# Patient Record
Sex: MALE | Race: WHITE | Employment: OTHER | URBAN - METROPOLITAN AREA
[De-identification: names, ages, dates, MRNs, and addresses within clinical notes are randomized per-mention and may not be internally consistent; named-entity substitution may affect disease eponyms.]

---

## 2018-02-16 PROBLEM — E78.00 PURE HYPERCHOLESTEROLEMIA: Status: ACTIVE | Noted: 2018-02-16

## 2018-02-16 PROBLEM — I10 ESSENTIAL HYPERTENSION: Status: ACTIVE | Noted: 2018-02-16

## 2019-02-26 PROBLEM — Z87.19 HISTORY OF BARRETT'S ESOPHAGUS: Status: ACTIVE | Noted: 2019-02-26

## 2019-02-26 PROBLEM — Z86.010 PERSONAL HISTORY OF COLONIC POLYPS: Status: ACTIVE | Noted: 2019-02-26

## 2019-02-26 PROBLEM — Z98.890 OTHER SPECIFIED POSTPROCEDURAL STATES: Status: ACTIVE | Noted: 2019-02-26

## 2019-02-26 PROBLEM — K21.9 GERD (GASTROESOPHAGEAL REFLUX DISEASE): Status: ACTIVE | Noted: 2019-02-26

## 2019-06-13 ENCOUNTER — HOSPITAL ENCOUNTER (OUTPATIENT)
Dept: MRI IMAGING | Age: 69
Discharge: HOME | End: 2019-06-13
Attending: OTOLARYNGOLOGY
Payer: MEDICARE

## 2019-06-13 PROCEDURE — 74011250636 HC RX REV CODE- 250/636: Performed by: OTOLARYNGOLOGY

## 2019-06-13 PROCEDURE — 70553 MRI BRAIN STEM W/O & W/DYE: CPT

## 2019-06-13 PROCEDURE — A9575 INJ GADOTERATE MEGLUMI 0.1ML: HCPCS | Performed by: OTOLARYNGOLOGY

## 2019-06-13 RX ORDER — SODIUM CHLORIDE 0.9 % (FLUSH) 0.9 %
10 SYRINGE (ML) INJECTION
Status: COMPLETED | OUTPATIENT
Start: 2019-06-13 | End: 2019-06-13

## 2019-06-13 RX ORDER — GADOTERATE MEGLUMINE 376.9 MG/ML
18 INJECTION INTRAVENOUS
Status: COMPLETED | OUTPATIENT
Start: 2019-06-13 | End: 2019-06-13

## 2019-06-13 RX ADMIN — Medication 10 ML: at 09:10

## 2019-06-13 RX ADMIN — GADOTERATE MEGLUMINE 18 ML: 376.9 INJECTION INTRAVENOUS at 09:10

## 2020-05-13 LAB — SARS-COV-2, NAA: NOT DETECTED

## 2020-05-15 RX ORDER — GENTAMICIN SULFATE 40 MG/ML
160 INJECTION, SOLUTION INTRAMUSCULAR; INTRAVENOUS
Status: DISCONTINUED | OUTPATIENT
Start: 2020-05-15 | End: 2020-05-15 | Stop reason: SDUPTHER

## 2020-05-17 ENCOUNTER — ANESTHESIA EVENT (OUTPATIENT)
Dept: SURGERY | Age: 70
End: 2020-05-17
Payer: MEDICARE

## 2020-05-18 ENCOUNTER — HOSPITAL ENCOUNTER (OUTPATIENT)
Age: 70
Setting detail: OUTPATIENT SURGERY
Discharge: HOME | End: 2020-05-18
Attending: UROLOGY | Admitting: UROLOGY
Payer: MEDICARE

## 2020-05-18 ENCOUNTER — ANESTHESIA (OUTPATIENT)
Dept: SURGERY | Age: 70
End: 2020-05-18
Payer: MEDICARE

## 2020-05-18 VITALS
HEART RATE: 61 BPM | RESPIRATION RATE: 16 BRPM | BODY MASS INDEX: 27.85 KG/M2 | DIASTOLIC BLOOD PRESSURE: 75 MMHG | TEMPERATURE: 98 F | SYSTOLIC BLOOD PRESSURE: 107 MMHG | WEIGHT: 194.13 LBS | OXYGEN SATURATION: 93 %

## 2020-05-18 DIAGNOSIS — R97.20 ELEVATED PSA: Primary | ICD-10-CM

## 2020-05-18 PROCEDURE — 74011250636 HC RX REV CODE- 250/636: Performed by: NURSE ANESTHETIST, CERTIFIED REGISTERED

## 2020-05-18 PROCEDURE — 77030003481 HC NDL BIOP GUN BARD -B: Performed by: UROLOGY

## 2020-05-18 PROCEDURE — 76010000138 HC OR TIME 0.5 TO 1 HR: Performed by: UROLOGY

## 2020-05-18 PROCEDURE — 74011250636 HC RX REV CODE- 250/636: Performed by: UROLOGY

## 2020-05-18 PROCEDURE — 74011000250 HC RX REV CODE- 250: Performed by: UROLOGY

## 2020-05-18 PROCEDURE — 74011000258 HC RX REV CODE- 258: Performed by: UROLOGY

## 2020-05-18 PROCEDURE — 74011250636 HC RX REV CODE- 250/636: Performed by: ANESTHESIOLOGY

## 2020-05-18 PROCEDURE — 76210000020 HC REC RM PH II FIRST 0.5 HR: Performed by: UROLOGY

## 2020-05-18 PROCEDURE — 88305 TISSUE EXAM BY PATHOLOGIST: CPT

## 2020-05-18 PROCEDURE — 76060000032 HC ANESTHESIA 0.5 TO 1 HR: Performed by: UROLOGY

## 2020-05-18 PROCEDURE — 88344 IMHCHEM/IMCYTCHM EA MLT ANTB: CPT

## 2020-05-18 PROCEDURE — 74011000250 HC RX REV CODE- 250: Performed by: NURSE ANESTHETIST, CERTIFIED REGISTERED

## 2020-05-18 PROCEDURE — 76210000063 HC OR PH I REC FIRST 0.5 HR: Performed by: UROLOGY

## 2020-05-18 RX ORDER — GLYCOPYRROLATE 0.2 MG/ML
INJECTION INTRAMUSCULAR; INTRAVENOUS AS NEEDED
Status: DISCONTINUED | OUTPATIENT
Start: 2020-05-18 | End: 2020-05-18 | Stop reason: HOSPADM

## 2020-05-18 RX ORDER — NALOXONE HYDROCHLORIDE 0.4 MG/ML
0.2 INJECTION, SOLUTION INTRAMUSCULAR; INTRAVENOUS; SUBCUTANEOUS AS NEEDED
Status: DISCONTINUED | OUTPATIENT
Start: 2020-05-18 | End: 2020-05-18 | Stop reason: HOSPADM

## 2020-05-18 RX ORDER — PROPOFOL 10 MG/ML
INJECTION, EMULSION INTRAVENOUS AS NEEDED
Status: DISCONTINUED | OUTPATIENT
Start: 2020-05-18 | End: 2020-05-18 | Stop reason: HOSPADM

## 2020-05-18 RX ORDER — FENTANYL CITRATE 50 UG/ML
INJECTION, SOLUTION INTRAMUSCULAR; INTRAVENOUS AS NEEDED
Status: DISCONTINUED | OUTPATIENT
Start: 2020-05-18 | End: 2020-05-18 | Stop reason: HOSPADM

## 2020-05-18 RX ORDER — LIDOCAINE HYDROCHLORIDE 20 MG/ML
INJECTION, SOLUTION EPIDURAL; INFILTRATION; INTRACAUDAL; PERINEURAL AS NEEDED
Status: DISCONTINUED | OUTPATIENT
Start: 2020-05-18 | End: 2020-05-18 | Stop reason: HOSPADM

## 2020-05-18 RX ORDER — DIPHENHYDRAMINE HCL 25 MG
25 CAPSULE ORAL
COMMUNITY

## 2020-05-18 RX ORDER — SODIUM CHLORIDE, SODIUM LACTATE, POTASSIUM CHLORIDE, CALCIUM CHLORIDE 600; 310; 30; 20 MG/100ML; MG/100ML; MG/100ML; MG/100ML
75 INJECTION, SOLUTION INTRAVENOUS CONTINUOUS
Status: DISCONTINUED | OUTPATIENT
Start: 2020-05-18 | End: 2020-05-18 | Stop reason: HOSPADM

## 2020-05-18 RX ORDER — CEFAZOLIN SODIUM/WATER 2 G/20 ML
2 SYRINGE (ML) INTRAVENOUS
Status: COMPLETED | OUTPATIENT
Start: 2020-05-18 | End: 2020-05-18

## 2020-05-18 RX ORDER — MIDAZOLAM HYDROCHLORIDE 1 MG/ML
INJECTION, SOLUTION INTRAMUSCULAR; INTRAVENOUS AS NEEDED
Status: DISCONTINUED | OUTPATIENT
Start: 2020-05-18 | End: 2020-05-18 | Stop reason: HOSPADM

## 2020-05-18 RX ORDER — SODIUM CHLORIDE 0.9 % (FLUSH) 0.9 %
5-40 SYRINGE (ML) INJECTION AS NEEDED
Status: DISCONTINUED | OUTPATIENT
Start: 2020-05-18 | End: 2020-05-18 | Stop reason: HOSPADM

## 2020-05-18 RX ORDER — SODIUM CHLORIDE 0.9 % (FLUSH) 0.9 %
5-40 SYRINGE (ML) INJECTION EVERY 8 HOURS
Status: DISCONTINUED | OUTPATIENT
Start: 2020-05-18 | End: 2020-05-18 | Stop reason: HOSPADM

## 2020-05-18 RX ORDER — HYDROCODONE BITARTRATE AND ACETAMINOPHEN 5; 325 MG/1; MG/1
1 TABLET ORAL
Qty: 10 TAB | Refills: 0 | Status: SHIPPED | OUTPATIENT
Start: 2020-05-18 | End: 2020-08-19 | Stop reason: CLARIF

## 2020-05-18 RX ORDER — LIDOCAINE HYDROCHLORIDE 10 MG/ML
0.1 INJECTION INFILTRATION; PERINEURAL AS NEEDED
Status: DISCONTINUED | OUTPATIENT
Start: 2020-05-18 | End: 2020-05-18 | Stop reason: HOSPADM

## 2020-05-18 RX ORDER — HYDROMORPHONE HYDROCHLORIDE 2 MG/ML
0.5 INJECTION, SOLUTION INTRAMUSCULAR; INTRAVENOUS; SUBCUTANEOUS
Status: DISCONTINUED | OUTPATIENT
Start: 2020-05-18 | End: 2020-05-18 | Stop reason: HOSPADM

## 2020-05-18 RX ORDER — LIDOCAINE HYDROCHLORIDE 20 MG/ML
JELLY TOPICAL AS NEEDED
Status: DISCONTINUED | OUTPATIENT
Start: 2020-05-18 | End: 2020-05-18 | Stop reason: HOSPADM

## 2020-05-18 RX ORDER — OXYCODONE AND ACETAMINOPHEN 5; 325 MG/1; MG/1
1 TABLET ORAL AS NEEDED
Status: DISCONTINUED | OUTPATIENT
Start: 2020-05-18 | End: 2020-05-18 | Stop reason: HOSPADM

## 2020-05-18 RX ORDER — MIDAZOLAM HYDROCHLORIDE 1 MG/ML
2 INJECTION, SOLUTION INTRAMUSCULAR; INTRAVENOUS
Status: DISCONTINUED | OUTPATIENT
Start: 2020-05-18 | End: 2020-05-18 | Stop reason: HOSPADM

## 2020-05-18 RX ORDER — TADALAFIL 5 MG/1
5 TABLET ORAL DAILY
Qty: 30 TAB | Refills: 12 | Status: SHIPPED | OUTPATIENT
Start: 2020-05-18 | End: 2020-08-19 | Stop reason: CLARIF

## 2020-05-18 RX ORDER — ONDANSETRON 2 MG/ML
INJECTION INTRAMUSCULAR; INTRAVENOUS AS NEEDED
Status: DISCONTINUED | OUTPATIENT
Start: 2020-05-18 | End: 2020-05-18 | Stop reason: HOSPADM

## 2020-05-18 RX ADMIN — PROPOFOL 30 MG: 10 INJECTION, EMULSION INTRAVENOUS at 08:15

## 2020-05-18 RX ADMIN — LIDOCAINE HYDROCHLORIDE 80 MG: 20 INJECTION, SOLUTION EPIDURAL; INFILTRATION; INTRACAUDAL; PERINEURAL at 08:14

## 2020-05-18 RX ADMIN — FENTANYL CITRATE 50 MCG: 50 INJECTION INTRAMUSCULAR; INTRAVENOUS at 08:13

## 2020-05-18 RX ADMIN — FENTANYL CITRATE 50 MCG: 50 INJECTION INTRAMUSCULAR; INTRAVENOUS at 08:15

## 2020-05-18 RX ADMIN — PROPOFOL 20 MG: 10 INJECTION, EMULSION INTRAVENOUS at 08:28

## 2020-05-18 RX ADMIN — GENTAMICIN SULFATE 160 MG: 40 INJECTION, SOLUTION INTRAMUSCULAR; INTRAVENOUS at 07:02

## 2020-05-18 RX ADMIN — PROPOFOL 20 MG: 10 INJECTION, EMULSION INTRAVENOUS at 08:21

## 2020-05-18 RX ADMIN — Medication 2 G: at 08:15

## 2020-05-18 RX ADMIN — PROPOFOL 20 MG: 10 INJECTION, EMULSION INTRAVENOUS at 08:20

## 2020-05-18 RX ADMIN — SODIUM CHLORIDE, SODIUM LACTATE, POTASSIUM CHLORIDE, AND CALCIUM CHLORIDE 75 ML/HR: 600; 310; 30; 20 INJECTION, SOLUTION INTRAVENOUS at 07:02

## 2020-05-18 RX ADMIN — ONDANSETRON 4 MG: 2 INJECTION INTRAMUSCULAR; INTRAVENOUS at 08:26

## 2020-05-18 RX ADMIN — PROPOFOL 20 MG: 10 INJECTION, EMULSION INTRAVENOUS at 08:25

## 2020-05-18 RX ADMIN — MIDAZOLAM HYDROCHLORIDE 2 MG: 2 INJECTION, SOLUTION INTRAMUSCULAR; INTRAVENOUS at 08:07

## 2020-05-18 RX ADMIN — GLYCOPYRROLATE 0.15 MG: 0.2 INJECTION, SOLUTION INTRAMUSCULAR; INTRAVENOUS at 08:07

## 2020-05-18 NOTE — ANESTHESIA POSTPROCEDURE EVALUATION
Procedure(s):  TRANSRECTAL ULTRASOUND GUIDED PROSTATE BIOPSY.     total IV anesthesia    Anesthesia Post Evaluation      Multimodal analgesia: multimodal analgesia used between 6 hours prior to anesthesia start to PACU discharge  Patient location during evaluation: PACU  Patient participation: complete - patient participated  Level of consciousness: awake and alert  Pain management: adequate  Airway patency: patent  Anesthetic complications: no  Cardiovascular status: acceptable  Respiratory status: acceptable  Hydration status: acceptable  Post anesthesia nausea and vomiting:  none      Vitals Value Taken Time   /75 5/18/2020  9:06 AM   Temp 36.1 °C (97 °F) 5/18/2020  8:42 AM   Pulse 60 5/18/2020  9:06 AM   Resp 16 5/18/2020  9:06 AM   SpO2 96 % 5/18/2020  9:06 AM

## 2020-05-18 NOTE — OP NOTES
[facility]  OPERATIVE REPORT    Name:  Jaskaran Jordan  MR#:  [id number]  :  1950  ACCOUNT #:  [de-identified]  DATE OF SERVICE:  2020    PREOPERATIVE DIAGNOSIS:  Elevated prostate-specific antigen. POSTOPERATIVE DIAGNOSIS:  Elevated prostate-specific antigen. PROCEDURE PERFORMED:  Transurethral ultrasound and biopsy of prostate under MAC anesthesia. SURGEON:  Edy Hooker MD    ASSISTANT:      ANESTHESIA:  MAC with topical Xylocaine. COMPLICATIONS:      SPECIMENS REMOVED:      IMPLANTS:      ESTIMATED BLOOD LOSS:      PROCEDURE INDICATION:  A 79-year-old male with elevating PSA. The patient has had some mild symptoms of BPH. His PSA had gone from upper limits of normal for his age which is in the 4.1-4.6 range and then up to 5.5 and then 6.5. It was elected to do a prostate ultrasound and biopsy. However, he could not tolerate it in the office due to a small degree of anal stenosis. It was elected to do his procedure in the hospital under [place] [address] anesthesia. PROCEDURE:  The patient was taken to the operating room suite, underwent general anesthesia, placed in the dorsal lithotomy position, prepped with a Betadine enema and Xylocaine jelly. The patient had the transrectal probe passed per rectum. Prostate images seemed normal.  It was symmetrical, about 55 mL volume. Seminal vesicles appeared normal.  No obvious hypoechoic areas. Biopsies were taken in a systematic randomized sample from the 12 quadrants of the prostate. These were sent to the lab for pathologic analysis. The patient had minimal bleeding. The probe was removed at the end of the case. The patient was sent to recovery in stable condition. He would finish out his Cipro and was sent home on some Norco for pain and was given a prescription for Cialis 5 mg take 1 a day for BPH and erectile dysfunction and continue on his tamsulosin. We will follow up to discuss results.   The patient should limit his activity today.       Florentin Soares MD [id number]  D:  05/18/2020 8:54  T:  05/18/2020 10:35  JOB #:  [id number]

## 2020-05-18 NOTE — DISCHARGE INSTRUCTIONS
Patient Education        Prostate Biopsy: About This Test  What is it? A prostate biopsy is a type of test. Your doctor takes about 10 to 12 small tissue samples from your prostate. Then another doctor looks at the tissue under a microscope to see if there are cancer cells. Why is this test done? You may need a prostate biopsy if your doctor found something of concern in your lab work or during your exam. A biopsy can help find out if you have prostate cancer. It may also be done for other reasons, such as monitoring the growth of prostate cancer for men on active surveillance. How do you prepare for the test?  Before you have a prostate biopsy, tell your doctor if you are taking any medicines or using any herbal supplements, or if you are allergic to any medicines. And tell your doctor if you have had bleeding problems, or you take aspirin or some other blood thinner. How is the test done? Some men have a prostate imaging test, such as an MRI or a CT scan, before their biopsy. The test results are used during the biopsy to select the areas of the prostate to sample. Before your biopsy, you may be given antibiotics to prevent infection. You may be asked to take off all of your clothes and put on a hospital gown. You may be given a sedative through a vein (IV) in your arm. The sedative will help you relax and stay still. If you have a general anesthetic, you will be in a recovery room for a few hours after the biopsy. Through the rectum  · You may be asked to kneel, lie on your side, or lie on your back. · Your doctor may inject an anesthetic around and into the prostate to numb the area before samples are taken. · An ultrasound probe will be gently inserted into your rectum. · A thin tool with a spring-loaded needle will be inserted next to the ultrasound probe. The ultrasound helps to locate the areas on the prostate where the samples will be taken.  If you had an MRI or CT scan, the test results will also be used to guide the sampling. · The needle enters the prostate and removes a sample. About 10 to 12 samples are usually taken. Through the perineum  · You will lie on an exam table either on your side or on your back with your knees bent. You will get anesthesia. The anesthesia may make you sleep. Or it may just numb the area being worked on.  · Your doctor will make a small cut in your perineum. Then he or she will collect samples from the prostate through the cut with a special tool. · An ultrasound probe inserted into the rectum may be used to help find the locations in the prostate where samples need to be taken. Sometimes other imaging, such as MRI, is used instead of or along with ultrasound. Or the test results from other imaging, such as an MRI or a CT scan, may be used to guide the sampling. How long will the test take? This test will take from 15 to 45 minutes, depending on how it's done. How is the test done? You will probably be able to go home right away, and you may feel tired the rest of the day. Your muscles may ache. Don't do heavy work or exercise for 4 hours after the test.  You may have mild pain in your pelvic area and blood in your urine for up to 5 days. You may have some blood in your semen for a week or longer. You may also have a change in color of your semen for up to 1 month after the biopsy. If the prostate samples are taken through the rectal wall (transrectal biopsy), you may have a small amount of bleeding from your rectum for 2 to 3 days after the biopsy. Follow-up care is a key part of your treatment and safety. Be sure to make and go to all appointments, and call your doctor if you are having problems. It's also a good idea to keep a list of the medicines you take. Ask your doctor when you can expect to have your test results. Where can you learn more?   Go to http://genie-ashlyn.info/  Enter Y504 in the search box to learn more about \"Prostate Biopsy: About This Test.\"  Current as of: August 21, 2019Content Version: 12.4  © 1922-3184 [employer]. Care instructions adapted under license by [employer] (which disclaims liability or warranty for this information). If you have questions about a medical condition or this instruction, always ask your healthcare professional. [address] any warranty or liability for your use of this information. ACTIVITY  · As tolerated and as directed by your doctor. · Bathe or shower as directed by your doctor. DIET  · Clear liquids until no nausea or vomiting; then light diet for the first day. · Advance to regular diet on second day, unless your doctor orders otherwise. · If nausea and vomiting continues, call your doctor. PAIN  · Take pain medication as directed by your doctor. · Call your doctor if pain is NOT relieved by medication. · DO NOT take aspirin of blood thinners unless directed by your doctor. CALL YOUR DOCTOR IF   · Excessive bleeding that does not stop after holding pressure over the area  · Temperature of 101 degrees F or above  · Excessive redness, swelling or bruising, and/ or green or yellow, smelly discharge from incision    AFTER ANESTHESIA   · For the first 24 hours: DO NOT Drive, Drink alcoholic beverages, or Make important decisions. · Be aware of dizziness following anesthesia and while taking pain medication. After general anesthesia or intravenous sedation, for 24 hours or while taking prescription Narcotics:  · Limit your activities  · A responsible adult needs to be with you for the next 24 hours  · Do not drive and operate hazardous machinery  · Do not make important personal or business decisions  · Do not drink alcoholic beverages  · If you have not urinated within 8 hours after discharge, please contact your surgeon on call.   · If you have sleep apnea and have a CPAP machine, please use it for all naps and sleeping. · Please use caution when taking narcotics and any of your home medications that may cause drowsiness. *  Please give a list of your current medications to your Primary Care Provider. *  Please update this list whenever your medications are discontinued, doses are      changed, or new medications (including over-the-counter products) are added. *  Please carry medication information at all times in case of emergency situations. These are general instructions for a healthy lifestyle:  No smoking/ No tobacco products/ Avoid exposure to second hand smoke  Surgeon General's Warning:  Quitting smoking now greatly reduces serious risk to your health. Obesity, smoking, and sedentary lifestyle greatly increases your risk for illness  A healthy diet, regular physical exercise & weight monitoring are important for maintaining a healthy lifestyle    You may be retaining fluid if you have a history of heart failure or if you experience any of the following symptoms:  Weight gain of 3 pounds or more overnight or 5 pounds in a week, increased swelling in our hands or feet or shortness of breath while lying flat in bed. Please call your doctor as soon as you notice any of these symptoms; do not wait until your next office visit.

## 2020-05-18 NOTE — PERIOP NOTES
Wife Deirdre Ho contacted, discussed discharge instructions, no further questions or concerns at this time. Security code obtained to give information.

## 2020-05-18 NOTE — H&P
[facility]  [address]  [facility] [address] [facility] Maria Fernanda. : 1950        PSA 11/23/15= 4.6  PSA 16 = 4.1, Free 0.80, 19.5%  PSA 17 = 4.6, Free 0.88, %19.1  PSA 18 = 4.1, Free 0.79, %19.3  PSA 19 = 5.5, Free 0.93, % 16.9  PSA 2020 = 6.48     Patient comes in today for prostate ultrasound and biopsy. HPI   81 y.o., male presents for prostate biopsy.             Past Medical History:   Diagnosis Date    Allergic rhinitis      Bilateral impacted cerumen      BMI 30.0-30.9,adult       BMI 30.3    Claustrophobia       mild    Essential hypertension 2018    Family history of colon cancer       mother and sister    GERD (gastroesophageal reflux disease)       hx of nissen fundoplication; managed with Prilosec    History of Jimenes's esophagus      History of stomach ulcers      Hx of colonic polyps 2016     adenomas    Hypertension       controlled with medication    IBS (irritable bowel syndrome)       no current medications    Laryngopharyngeal reflux      Neck mass      Post-nasal drainage      Pure hypercholesterolemia 2018            Past Surgical History:   Procedure Laterality Date    HX COLONOSCOPY   last 16     Liv--asc TA, sigmoid TA, random bx negative--5 year recall    HX GI        nissen fundoplication surgery due to reflux    HX HERNIA REPAIR Right       right inguinal    HX MOHS PROCEDURES Right 2011     X2    HX OPEN CHOLECYSTECTOMY       HX OTHER SURGICAL   2013     S/P Excision of right submandibular gland with FS    HX TONSILLECTOMY                 Current Outpatient Medications   Medication Sig Dispense Refill    ciprofloxacin HCl (Cipro) 500 mg tablet Take the first pill 1 hour prior to procedure then BID until completed 14 Tab 0    diazePAM (Valium) 10 mg tablet Take one hour prior to procedure.  Must have  1 Tab 0    tamsulosin (FLOMAX) 0.4 mg capsule TAKE 1 CAPSULE AT BEDTIME 90 Cap 3    omeprazole (PRILOSEC) 40 mg capsule Take 1 Cap by mouth daily. 90 Cap 3    olmesartan (BENICAR) 20 mg tablet Take 1 Tab by mouth daily. 90 Tab 4      No Known Allergies  Social History            Socioeconomic History    Marital status:        Spouse name: Not on file    Number of children: Not on file    Years of education: Not on file    Highest education level: Not on file   Occupational History    Not on file   Social Needs    Financial resource strain: Not on file    Food insecurity       Worry: Not on file       Inability: Not on file    Transportation needs       Medical: Not on file       Non-medical: Not on file   Tobacco Use    Smoking status: Former Smoker       Packs/day: 0.25       Years: 1.00       Pack years: 0.25       Last attempt to quit: 1978       Years since quittin.0    Smokeless tobacco: Former User   Substance and Sexual Activity    Alcohol use:  Yes       Comment: on occasion    Drug use: No    Sexual activity: Not on file   Lifestyle    Physical activity       Days per week: Not on file       Minutes per session: Not on file    Stress: Not on file   Relationships    Social connections       Talks on phone: Not on file       Gets together: Not on file       Attends Jainism service: Not on file       Active member of club or organization: Not on file       Attends meetings of clubs or organizations: Not on file       Relationship status: Not on file    Intimate partner violence       Fear of current or ex partner: Not on file       Emotionally abused: Not on file       Physically abused: Not on file       Forced sexual activity: Not on file   Other Topics Concern    Not on file   Social History Narrative    Not on file            Family History   Problem Relation Age of Onset    Arthritis-osteo Mother      Heart Disease Mother      Cancer Mother           colon    No Known Problems Father           did not know father   Nichole Rui Sister           colon    Cancer Sister           ovarian CA, melonoma    Malignant Hyperthermia Neg Hx      Pseudocholinesterase Deficiency Neg Hx      Delayed Awakening Neg Hx      Post-op Nausea/Vomiting Neg Hx      Emergence Delirium Neg Hx      Post-op Cognitive Dysfunction Neg Hx      Other Neg Hx           Patient Vitals for the past 12 hrs:   Temp Pulse Resp BP SpO2   05/18/20 0652 97.8 °F (36.6 °C) 68 18 146/80 100 %     Physical Exam  Constitutional:       Appearance: Normal appearance. HENT:      Head: Normocephalic. Cardiovascular:      Rate and Rhythm: Normal rate and regular rhythm. Pulmonary:      Effort: Pulmonary effort is normal. No respiratory distress. Breath sounds: Normal breath sounds. Abdominal:      Palpations: There is no mass. Hernia: No hernia is present. Genitourinary:     Penis: Normal.    Skin:     General: Skin is warm and dry. Neurological:      General: No focal deficit present. Mental Status: He is alert. Psychiatric:         Mood and Affect: Mood normal.         Behavior: Behavior normal.               TRANSRECTAL ULTRASOUND GUIDED BIOPSY OF THE PROSTATE  Even using topical Xylocaine I could not get the probe in the rear end. Due to excessive pain. Assessment and Plan      ICD-10-CM ICD-9-CM     1.  Elevated PSA R97.20 790.93 AMB POC US, TRANSRECTAL         gentamicin (GARAMYCIN) injection 80 mg         CANCELED: NM BIOPSY OF PROSTATE,NEEDLE/PUNCH         CANCELED: [facility] SURGICAL PATHOLOGY         CANCELED: SURGICAL TRAYS         CANCELED: [facility] SURGICAL PATHOLOGY      Patient Active Problem List   Diagnosis Code    Erectile dysfunction due to arterial insufficiency N52.01    Elevated PSA R97.20    Laryngopharyngeal reflux K21.9    Neck mass R22.1    Essential hypertension I10    Pure hypercholesterolemia E78.00    BMI 29.0-29.9,adult Z68.29    GERD (gastroesophageal reflux disease) K21.9    Other specified postprocedural states Z98.890    History of Jimenes's esophagus Z87.19    Personal history of colonic polyps Z86.010          Patient could not tolerate the office prostate ultrasound/biopsy so we will get this set up as an outpatient

## 2020-05-18 NOTE — ANESTHESIA PREPROCEDURE EVALUATION
Relevant Problems   No relevant active problems   Anesthetic History   No history of anesthetic complications            Review of Systems / Medical History  Patient summary reviewed and pertinent labs reviewed    Pulmonary  Within defined limits                 Neuro/Psych   Within defined limits           Cardiovascular    Hypertension: well controlled              Exercise tolerance: >4 METS     GI/Hepatic/Renal     GERD: poorly controlled           Endo/Other  Within defined limits           Other Findings              Physical Exam    Airway  Mallampati: II  TM Distance: 4 - 6 cm  Neck ROM: normal range of motion   Mouth opening: Normal     Cardiovascular    Rhythm: regular  Rate: normal         Dental  No notable dental hx       Pulmonary  Breath sounds clear to auscultation               Abdominal  GI exam deferred       Other Findings            Anesthetic Plan    ASA: 2  Anesthesia type: total IV anesthesia          Induction: Intravenous  Anesthetic plan and risks discussed with: Patient                Anesthetic History   No history of anesthetic complications            Review of Systems / Medical History  Patient summary reviewed and pertinent labs reviewed    Pulmonary  Within defined limits              Comments: Allergic rhinitis   Neuro/Psych   Within defined limits           Cardiovascular    Hypertension: well controlled              Exercise tolerance: >4 METS     GI/Hepatic/Renal     GERD: well controlled          Comments: IBS    H/O Jimenes's esophagus Endo/Other             Other Findings            Physical Exam    Airway  Mallampati: II  TM Distance: 4 - 6 cm  Neck ROM: normal range of motion   Mouth opening: Normal     Cardiovascular    Rhythm: regular           Dental  No notable dental hx       Pulmonary  Breath sounds clear to auscultation               Abdominal  GI exam deferred       Other Findings            Anesthetic Plan    ASA: 2  Anesthesia type: total IV anesthesia          Induction: Intravenous  Anesthetic plan and risks discussed with: Patient

## 2020-05-18 NOTE — BRIEF OP NOTE
Brief Postoperative Note    Patient: Sydnee Hobson. YOB: 1950  MRN: [id number]    Date of Procedure: 5/18/2020     Pre-Op Diagnosis: Elevated PSA [R97.20]    Post-Op Diagnosis: Same as preoperative diagnosis.       Procedure(s):  TRANSRECTAL ULTRASOUND GUIDED PROSTATE BIOPSY    Surgeon(s):  Halie Asencio MD    Surgical Assistant: None    Anesthesia: MAC     Estimated Blood Loss (mL): Minimal    Complications: None    Specimens:   ID Type Source Tests Collected by Time Destination   1 : RB Preservative Prostate  Halie Asencio MD 5/18/2020 6374 Pathology   2 : RLB Preservative Edwige Asencio MD 5/18/2020 2214 Pathology   3 : RLNATHANIEL Preservative Prostate  Halie Asencio MD 5/18/2020 6376 Pathology   4 : RM Preservative Prostate  Halie Asencio MD 5/18/2020 8627 Pathology   5 : RA Preservative Edwige Asencio MD 5/18/2020 5013 Pathology   6 : RLA Preservative Edwige Asencio MD 5/18/2020 0398 Pathology   7 : LB Preservative Edwige Asencio MD 5/18/2020 0825 Pathology   8 : LLB Preservative Edwige Asencio MD 5/18/2020 0825 Pathology   9 : LLM Preservative Edwige Asencio MD 5/18/2020 5820 Pathology   10 : LM Preservative Edwige Asencio MD 5/18/2020 0827 Pathology   11 : LLA Preservative Edwige Asencio MD 5/18/2020 4801 Pathology   12 : LA Preservative Edwige Asencio MD 5/18/2020 0830 Pathology        Implants: * No implants in log *    Drains: * No LDAs found *    Findings:     Electronically Signed by Morteza Parker MD on 5/18/2020 at 8:37 AM

## 2020-06-12 ENCOUNTER — HOSPITAL ENCOUNTER (OUTPATIENT)
Dept: NUCLEAR MEDICINE | Age: 70
Discharge: HOME | End: 2020-06-12
Attending: UROLOGY
Payer: MEDICARE

## 2020-06-12 DIAGNOSIS — C61 PROSTATE CANCER (HCC): ICD-10-CM

## 2020-06-12 PROCEDURE — 78306 BONE IMAGING WHOLE BODY: CPT

## 2020-06-18 ENCOUNTER — HOSPITAL ENCOUNTER (OUTPATIENT)
Dept: MRI IMAGING | Age: 70
Discharge: HOME | End: 2020-06-18
Attending: UROLOGY
Payer: MEDICARE

## 2020-06-18 DIAGNOSIS — C61 PROSTATE CANCER (HCC): ICD-10-CM

## 2020-06-18 PROCEDURE — 72197 MRI PELVIS W/O & W/DYE: CPT

## 2020-06-18 PROCEDURE — A9575 INJ GADOTERATE MEGLUMI 0.1ML: HCPCS | Performed by: UROLOGY

## 2020-06-18 PROCEDURE — 74011250636 HC RX REV CODE- 250/636: Performed by: UROLOGY

## 2020-06-18 RX ORDER — SODIUM CHLORIDE 0.9 % (FLUSH) 0.9 %
10 SYRINGE (ML) INJECTION
Status: COMPLETED | OUTPATIENT
Start: 2020-06-18 | End: 2020-06-18

## 2020-06-18 RX ORDER — GADOTERATE MEGLUMINE 376.9 MG/ML
18 INJECTION INTRAVENOUS
Status: COMPLETED | OUTPATIENT
Start: 2020-06-18 | End: 2020-06-18

## 2020-06-18 RX ADMIN — Medication 10 ML: at 08:37

## 2020-06-18 RX ADMIN — GADOTERATE MEGLUMINE 18 ML: 376.9 INJECTION INTRAVENOUS at 08:37

## 2020-08-10 ENCOUNTER — HOSPITAL ENCOUNTER (OUTPATIENT)
Dept: PHYSICAL THERAPY | Age: 70
Discharge: HOME | End: 2020-08-10
Attending: UROLOGY
Payer: MEDICARE

## 2020-08-10 DIAGNOSIS — C61 PROSTATE CANCER (HCC): ICD-10-CM

## 2020-08-10 PROCEDURE — 97110 THERAPEUTIC EXERCISES: CPT

## 2020-08-10 PROCEDURE — 97162 PT EVAL MOD COMPLEX 30 MIN: CPT

## 2020-08-10 NOTE — THERAPY EVALUATION
Laura Rowland. : 1950  [place]  Secondary: [address] at [address] Assessment 8/10/2020    ICD-10: Treatment Diagnosis: R27.8 Lack of coordination (muscle incoordination),   Precautions/Allergies:   Patient has no known allergies. TREATMENT PLAN:  Effective Dates: 8/10/2020 TO 2020 (90 days). Frequency/Duration: Follow up 3 weeks post-op, then resume PT 1 x weekly. Duration to be determined post-operatively. MEDICAL/REFERRING DIAGNOSIS:  Prostate cancer ([facility] [address] Ranjit Nelson   DATE OF ONSET:   REFERRING PHYSICIAN: Lake Brenner DO MD Orders: evaluate and treat  Return MD Appointment: Pre-operative 20     INITIAL ASSESSMENT:  Mr. Birdie Ritchie presents with decreased coordination, strength and endurance of pelvic floor muscle pre operatively. Today he was educated on bladder health, kegel exercises, pelvic floor anatomy and role of musculature and precautions with catheter post operatively. He required verbal and visual cuing for proper activation and isolation of pelvic floor muscles. He was able to contract muscle for 5 seconds with breath holding and accessory muscle use of gluteals (minimal). He was given kegel exercises to do at home prior to surgery and once catheter is removed. He was educated on pain after surgery and precautions with kegel exercises. He was able to demonstrate improved coordination by the end of the session today. He will continue to benefit from skilled physical therapy to address above mentioned deficits and restore normal PFM function post operatively to minimize urinary incontinence     PROBLEM LIST (Impacting functional limitations):  1. Decreased Strength  2. Decreased Knowledge of Precautions  3. Decreased Emmons with Home Exercise Program  4.  Decreased strength of pelvic floor which limits bladder control INTERVENTIONS PLANNED:  1. Neuromuscular re-education  2. Biofeedback as needed  3. HEP  4. Bladder retraining  5. Bladder education  6. Therapeutic Activites  7. Therapeutic Exercise/Strengthening     GOALS: (Goals have been discussed and agreed upon with patient.)  Short-Term Functional Goals: Time Frame: 4 weeks  1. Pt will demonstrate I with basic PFM HEP to improve awareness, coordination, and timing of PFM. 2. Pt will increase water intake by 16 oz and decrease irritant consumption by 8 oz to improve bladder health and decrease UI. Discharge Goals: Time Frame: 8 weeks  1. Pt will demonstrate 10 quick flicks of the pelvic floor muscle group, without compensation, to implement urge suppression appropriately with urgency of urination and decrease the number of pads used per day. 2. Pt will increase PFM endurance to 10 seconds without compensation to improve standing tolerance without UI to > 1 hour. 3. Pt will increase strength of the pelvic floor muscle group to 30 mV or > via SEMG to decrease UI with transitional movements such as sit to stand and supine to sit. 4. Pt will demonstrate I with advanced core stabilization and general mobility program to facilitate carry over and I management of symptoms. Outcome Measure:   NIH- Chronic Prostatitis Symptom Index (NIH-CPSI) for Males   Preop: 8/10 Post op initial:   Pain 0    Urinary Symptoms 1    QOL Index 1      Interpretation of Score: The NIH-CPSI has a total score range from 0 to 43, and it includes three subscales addressing pain (score range 0-21), urinary symptoms (score range 0-10), and quality of life (QOL) (score range 0-12). ·   Medical Necessity:   · Patient is expected to demonstrate progress in strength, coordination and functional technique to minimize UI following prostatectomy. · Patient is expected to demonstrate progress in coordination and functional technique to minimize post operative incontinence.  .  Reason for Services/Other Comments:  · Patient continues to require skilled intervention due to above mentioned deficits. Total Duration: 60 minutes        Rehabilitation Potential For Stated Goals: Excellent  Regarding Dolly Beck Puri's therapy, I certify that the treatment plan above will be carried out by a therapist or under their direction. Thank you for this referral,  [facility], PT     Referring Physician Signature: Adia Dixon DO              Date                   Ambulatory/Rehab Services H2 Model Falls Risk Assessment    Risk Factors:       (1)  Gender [Male] Ability to Rise from Chair:       (0)  Ability to rise in a single movement    Falls Prevention Plan:       No modifications necessary   Total: (5 or greater = High Risk): 1    ©2010 [facility] of [facility]. [facility] States Patent #[id number]. Federal Law prohibits the replication, distribution or use without written permission from [facility] [employer]           PAIN/SUBJECTIVE:   Initial:   0/10 Post Session:  0/10      HISTORY:   History of Present Injury/Illness (Reason for Referral):  Pt is a  77 yo male referred to pelvic floor physical therapy (PFPT) 2/2 prostate cancer by Adia Dixon DO. Findings were found in routine screening. He will be undergoing RALP on 8/27/20. Urinary: Frequency 1-4 x/day, 0 x/night. Positive for incomplete emptying and urinary hesitancy/intermittency. Negative for stress urinary incontinence, urge urinary incontinence, urinary urgency and dysuria. Pt does not use pads for protection; 0 pads per day (PPD). Fluid intake: 4-5 bottles oz water/day; bladder irritants include: 1 coke daily, intermittent beer    Bowel: Frequency regular Positive for diarrhea since 1985. Negative for constipation. Pt does not use pads for protection; 0 pads per day (PPD). [place] stool type: 6. Use of stool softeners or laxatives? NO    Sexual: Pt is not sexually active with female partners.  POSITIVE for history of erectile dysfunction. Pain: Location: none currently. Denies hip/low back pain. Past Medical History/Comorbidities:   Mr. Jadon Manning  has a past medical history of Allergic rhinitis, Bilateral impacted cerumen, BMI 30.0-30.9,adult, Claustrophobia, Essential hypertension (2/16/2018), Family history of colon cancer, GERD (gastroesophageal reflux disease), History of Jimenes's esophagus, History of stomach ulcers, colonic polyps (2016), Hypertension, IBS (irritable bowel syndrome), Insomnia, Laryngopharyngeal reflux, Neck mass, Post-nasal drainage, and Pure hypercholesterolemia (2/16/2018). Mr. Jadon Manning  has a past surgical history that includes hx mohs procedure (Right, 2011); hx colonoscopy (last 2/2/16); hx open cholecystectomy (1989); hx gi (1992); hx hernia repair (Right); hx other surgical (04/04/2013); hx tonsillectomy; and hx rotator cuff repair (Right, 2008). Social History/Living Environment:      · Social activities restricted due to urinary incontinence? NO  · Lives with wife, son  · Wife and sister-in-law will help with care after surgery. Prior Level of Function/Work/Activity:  No problem  Personal Factors:          Sex:  male  Current Medications:    Current Outpatient Medications:     olmesartan (BENICAR) 20 mg tablet, TAKE 1 TABLET DAILY, Disp: 90 Tab, Rfl: 0    diphenhydrAMINE (BenadryL) 25 mg capsule, Take 25 mg by mouth every six (6) hours as needed. Indications: inflammation of the nose due to an allergy, Disp: , Rfl:     HYDROcodone-acetaminophen (NORCO) 5-325 mg per tablet, Take 1 Tab by mouth every six (6) hours as needed for Pain for up to 10 doses. Max Daily Amount: 4 Tabs., Disp: 10 Tab, Rfl: 0    tadalafiL (Cialis) 5 mg tablet, Take 1 Tab by mouth daily.  Indications: enlarged prostate with urination problem, the inability to have an erection, Disp: 30 Tab, Rfl: 12    melatonin 5 mg tablet, Take 10 mg by mouth nightly., Disp: , Rfl:     red yeast rice extract 600 mg cap, Take 600 mg by mouth now., Disp: , Rfl:     tamsulosin (FLOMAX) 0.4 mg capsule, TAKE 1 CAPSULE AT BEDTIME, Disp: 90 Cap, Rfl: 3   Date Last Reviewed:  8/10/2020   Number of Personal Factors/Comorbidities that affect the Plan of Care: 1-2: MODERATE COMPLEXITY   EXAMINATION:   External Observation:   · Voluntary Contraction: Present  · Voluntary Relaxation: Present  · Involuntary Contraction: Present  · Involuntary Relaxation: Present    SEMG evaluation (Date 8/10/20):  ·  Resting Tone: 3mV  ·  Quality of Resting Tone: Normal  ·  5 Second Hold: 20 mV uV  ·  10 Second Hold: Unable to maintain  ·  Quality of Recruitment: Good   ·  Quality of Relaxation: Good   ·  Quality of Holding: Fair   ·  Stability of Hold: Fair   ·  Stability of Rest: Good      **Compensatory breath holding present. Pt had difficulty coordinating PF contractions during exhalation. Body Structures Involved:  1. Nerves  2. Muscles Body Functions Affected:  1. Genitourinary  2. Reproductive  3. Neuromusculoskeletal Activities and Participation Affected:  1. Self Care  2. Domestic Life  3.  Interpersonal Interactions and Relationships   Number of elements that affect the Plan of Care: 3: MODERATE COMPLEXITY   CLINICAL PRESENTATION:   Presentation: Evolving clinical presentation with changing clinical characteristics: MODERATE COMPLEXITY   CLINICAL DECISION MAKING:   Use of outcome tool(s) and clinical judgement create a POC that gives a: Questionable prediction of patient's progress: MODERATE COMPLEXITY      Julia Rocha, PT, DPT

## 2020-08-10 NOTE — PROGRESS NOTES
Tiffany Hubbard. : 1950  [place]  Secondary: [address] at [address], [address], [facility]  [phone]   [phone]      OUTPATIENT PHYSICAL THERAPY: Daily Treatment Note 8/10/2020   Visit Count:  1  R27.8 Lack of coordination (muscle incoordination),     Pre-treatment Symptoms/Complaints:  Pre-prostatectomy  Pain: Initial: Pain Intensity 1: 0/10 Post Session:  0/10   Medications Last Reviewed:  8/10/2020  Updated Objective Findings:  See evaluation note from today   TREATMENT:   THERAPEUTIC EXERCISE: (40 minutes):  Exercises per grid below to improve strength and coordination. Required moderate visual, verbal and manual cues to promote proper body mechanics. Progressed repetitions as indicated. TE= therapeutic exercise, TA= therapeutic activity, MT= manual therapy, NE= neuro re-education     Date:  8/10 Date:   Date:     Therapeutic Exercise Parameters Parameters Parameters   Pt Edu: Role of PFM in support, stability, sphincteric, and sexual function. POC Reviewed. HEP provided.   10 minutes     Pre/post-op plan care - bladder/bowel health, PFM training pre/post op exercise program.  10 minutes     sEMG biofeedback for PFM training 20 minutes                               THERAPEUTIC ACTIVITY: (*)     Date:  * Date:   Date:     Therapeutic Activity Parameters Parameters Parameters    *                                             MANUAL THERAPY: (  minutes) to reduce tone and improve tissue elasticity   (Used abbreviations: MET - muscle energy technique; SCS- Strain counter strain; CTM- Connective tissue mobilizations; CR- Contract/relax; SP- Sustained pressure, TrP- Trigger point release, IASTM- Instrument assisted soft tissue mobilizations, TDN- Trigger point dry needling, DB - diaphragmatic breathing, PFM - pelvic floor muscles, OI - obturator internus, IC - iliococcygeus, PC - pubococcygeus, DTP - deep transverse perineum. NEUROMUSCULAR RE-EDUCATION:  (* minutes)      Treatment/Session Summary:    · Response to Treatment:  Pt reports good understanding of plan of care, as well as prescribed home exercise program.  All questions were answered to pt's satisfaction. Pt was invited to call with any further questions or concerns.   · Communication/Consultation:  None today HEP provided  · Equipment provided today:  None today  · Recommendations/Intent for next treatment session: Next visit will focus on 1) Post-op assessment  Total Treatment Billable Duration: Evaluation 20 minutes, treatment 40 minutes  PT Patient Time In/Time Out  Time In: 0900  Time Out: 1202 [facility], PT    Future Appointments   Date Time Provider Lesley Valdez   8/17/2020 11:20 AM [address] THRU SSA IMD IMD   8/19/2020  9:45 AM Jorge Brenner, DO [id number] PGU   8/19/2020 11:15 AM SFD ASSESSMENT RM 04 SFDORPA SFD OR PRE A   9/3/2020  2:00 PM Jorge Brenner, DO [id number] PGU   9/17/2020 10:00 AM Bobby Swan, PT SFEORPT SFE

## 2020-08-19 ENCOUNTER — HOSPITAL ENCOUNTER (OUTPATIENT)
Dept: SURGERY | Age: 70
Discharge: HOME | End: 2020-08-19
Payer: MEDICARE

## 2020-08-19 ENCOUNTER — HOSPITAL ENCOUNTER (OUTPATIENT)
Dept: GENERAL RADIOLOGY | Age: 70
Discharge: HOME | End: 2020-08-19
Attending: UROLOGY
Payer: MEDICARE

## 2020-08-19 VITALS
HEIGHT: 69 IN | SYSTOLIC BLOOD PRESSURE: 120 MMHG | WEIGHT: 194.2 LBS | OXYGEN SATURATION: 97 % | TEMPERATURE: 97.8 F | DIASTOLIC BLOOD PRESSURE: 73 MMHG | RESPIRATION RATE: 16 BRPM | BODY MASS INDEX: 28.76 KG/M2

## 2020-08-19 LAB
ALBUMIN SERPL-MCNC: 4 G/DL (ref 3.2–4.6)
ALBUMIN/GLOB SERPL: 1.3 {RATIO} (ref 1.2–3.5)
ALP SERPL-CCNC: 53 U/L (ref 50–136)
ALT SERPL-CCNC: 26 U/L (ref 12–65)
ANION GAP SERPL CALC-SCNC: 4 MMOL/L (ref 7–16)
APPEARANCE UR: CLEAR
AST SERPL-CCNC: 20 U/L (ref 15–37)
ATRIAL RATE: 52 BPM
BACTERIA URNS QL MICRO: 0 /HPF
BILIRUB SERPL-MCNC: 1.5 MG/DL (ref 0.2–1.1)
BILIRUB UR QL: NEGATIVE
BUN SERPL-MCNC: 23 MG/DL (ref 8–23)
CALCIUM SERPL-MCNC: 9.2 MG/DL (ref 8.3–10.4)
CALCULATED P AXIS, ECG09: 17 DEGREES
CALCULATED R AXIS, ECG10: 14 DEGREES
CALCULATED T AXIS, ECG11: -3 DEGREES
CASTS URNS QL MICRO: 0 /LPF
CHLORIDE SERPL-SCNC: 109 MMOL/L (ref 98–107)
CO2 SERPL-SCNC: 30 MMOL/L (ref 21–32)
COLOR UR: YELLOW
CREAT SERPL-MCNC: 1.64 MG/DL (ref 0.8–1.5)
CRYSTALS URNS QL MICRO: 0 /LPF
DIAGNOSIS, 93000: NORMAL
EPI CELLS #/AREA URNS HPF: NORMAL /HPF
ERYTHROCYTE [DISTWIDTH] IN BLOOD BY AUTOMATED COUNT: 13.6 % (ref 11.9–14.6)
GLOBULIN SER CALC-MCNC: 3 G/DL (ref 2.3–3.5)
GLUCOSE SERPL-MCNC: 87 MG/DL (ref 65–100)
GLUCOSE UR STRIP.AUTO-MCNC: NEGATIVE MG/DL
HCT VFR BLD AUTO: 42.7 % (ref 41.1–50.3)
HGB BLD-MCNC: 14 G/DL (ref 13.6–17.2)
HGB UR QL STRIP: ABNORMAL
INR PPP: 1
KETONES UR QL STRIP.AUTO: NEGATIVE MG/DL
LEUKOCYTE ESTERASE UR QL STRIP.AUTO: NEGATIVE
MCH RBC QN AUTO: 32.2 PG (ref 26.1–32.9)
MCHC RBC AUTO-ENTMCNC: 32.8 G/DL (ref 31.4–35)
MCV RBC AUTO: 98.2 FL (ref 79.6–97.8)
MUCOUS THREADS URNS QL MICRO: 0 /LPF
NITRITE UR QL STRIP.AUTO: NEGATIVE
NRBC # BLD: 0 K/UL (ref 0–0.2)
P-R INTERVAL, ECG05: 166 MS
PH UR STRIP: 5.5 [PH] (ref 5–9)
PLATELET # BLD AUTO: 133 K/UL (ref 150–450)
PMV BLD AUTO: 10.9 FL (ref 9.4–12.3)
POTASSIUM SERPL-SCNC: 4.2 MMOL/L (ref 3.5–5.1)
PROT SERPL-MCNC: 7 G/DL (ref 6.3–8.2)
PROT UR STRIP-MCNC: NEGATIVE MG/DL
PROTHROMBIN TIME: 12.9 SEC (ref 12–14.7)
Q-T INTERVAL, ECG07: 446 MS
QRS DURATION, ECG06: 94 MS
QTC CALCULATION (BEZET), ECG08: 414 MS
RBC # BLD AUTO: 4.35 M/UL (ref 4.23–5.6)
RBC #/AREA URNS HPF: NORMAL /HPF
SODIUM SERPL-SCNC: 143 MMOL/L (ref 136–145)
SP GR UR REFRACTOMETRY: 1.02 (ref 1–1.02)
UROBILINOGEN UR QL STRIP.AUTO: 0.2 EU/DL (ref 0.2–1)
VENTRICULAR RATE, ECG03: 52 BPM
WBC # BLD AUTO: 5.1 K/UL (ref 4.3–11.1)
WBC URNS QL MICRO: NORMAL /HPF

## 2020-08-19 PROCEDURE — 71046 X-RAY EXAM CHEST 2 VIEWS: CPT

## 2020-08-19 PROCEDURE — 81015 MICROSCOPIC EXAM OF URINE: CPT

## 2020-08-19 PROCEDURE — 85610 PROTHROMBIN TIME: CPT

## 2020-08-19 PROCEDURE — 85027 COMPLETE CBC AUTOMATED: CPT

## 2020-08-19 PROCEDURE — 81003 URINALYSIS AUTO W/O SCOPE: CPT

## 2020-08-19 PROCEDURE — 87086 URINE CULTURE/COLONY COUNT: CPT

## 2020-08-19 PROCEDURE — 80053 COMPREHEN METABOLIC PANEL: CPT

## 2020-08-19 NOTE — PERIOP NOTES
Recent Results (from the past 12 hour(s))   CBC W/O DIFF    Collection Time: 08/19/20 11:23 AM   Result Value Ref Range    WBC 5.1 4.3 - 11.1 K/uL    RBC 4.35 4.23 - 5.6 M/uL    HGB 14.0 13.6 - 17.2 g/dL    HCT 42.7 41.1 - 50.3 %    MCV 98.2 (H) 79.6 - 97.8 FL    MCH 32.2 26.1 - 32.9 PG    MCHC 32.8 31.4 - 35.0 g/dL    RDW 13.6 11.9 - 14.6 %    PLATELET 110 (L) 633 - 450 K/uL    MPV 10.9 9.4 - 12.3 FL    ABSOLUTE NRBC 0.00 0.0 - 0.2 K/uL   METABOLIC PANEL, COMPREHENSIVE    Collection Time: 08/19/20 11:23 AM   Result Value Ref Range    Sodium 143 136 - 145 mmol/L    Potassium 4.2 3.5 - 5.1 mmol/L    Chloride 109 (H) 98 - 107 mmol/L    CO2 30 21 - 32 mmol/L    Anion gap 4 (L) 7 - 16 mmol/L    Glucose 87 65 - 100 mg/dL    BUN 23 8 - 23 MG/DL    Creatinine 1.64 (H) 0.8 - 1.5 MG/DL    GFR est AA 54 (L) >60 ml/min/1.73m2    GFR est non-AA 45 (L) >60 ml/min/1.73m2    Calcium 9.2 8.3 - 10.4 MG/DL    Bilirubin, total 1.5 (H) 0.2 - 1.1 MG/DL    ALT (SGPT) 26 12 - 65 U/L    AST (SGOT) 20 15 - 37 U/L    Alk.  phosphatase 53 50 - 136 U/L    Protein, total 7.0 6.3 - 8.2 g/dL    Albumin 4.0 3.2 - 4.6 g/dL    Globulin 3.0 2.3 - 3.5 g/dL    A-G Ratio 1.3 1.2 - 3.5     PROTHROMBIN TIME + INR    Collection Time: 08/19/20 11:23 AM   Result Value Ref Range    Prothrombin time 12.9 12.0 - 14.7 sec    INR 1.0     URINALYSIS W/ RFLX MICROSCOPIC    Collection Time: 08/19/20 11:32 AM   Result Value Ref Range    Color YELLOW      Appearance CLEAR      Specific gravity 1.020 1.001 - 1.023      pH (UA) 5.5 5.0 - 9.0      Protein Negative NEG mg/dL    Glucose Negative mg/dL    Ketone Negative NEG mg/dL    Bilirubin Negative NEG      Blood TRACE (A) NEG      Urobilinogen 0.2 0.2 - 1.0 EU/dL    Nitrites Negative NEG      Leukocyte Esterase Negative NEG     URINE MICROSCOPIC    Collection Time: 08/19/20 11:32 AM   Result Value Ref Range    WBC 0-3 0 /hpf    RBC 0-3 0 /hpf    Epithelial cells 0-3 0 /hpf    Bacteria 0 0 /hpf    Casts 0 0 /lpf Crystals, urine 0 0 /LPF    Mucus 0 0 /lpf   EKG, 12 LEAD, INITIAL    Collection Time: 08/19/20 11:35 AM   Result Value Ref Range    Ventricular Rate 52 BPM    Atrial Rate 52 BPM    P-R Interval 166 ms    QRS Duration 94 ms    Q-T Interval 446 ms    QTC Calculation (Bezet) 414 ms    Calculated P Axis 17 degrees    Calculated R Axis 14 degrees    Calculated T Axis -3 degrees    Diagnosis       Sinus bradycardia with occasional Premature ventricular complexes  Otherwise normal ECG  No previous ECGs available          Results reviewed and routed to surgeon. No further action required.

## 2020-08-19 NOTE — PERIOP NOTES
Patient verified name and     Order for consent found in EHR and matches case posting; patient verified. Type 3 surgery, assessment complete. Labs per surgeon: CBC, CMP, PT, UA and UC&S today; Type & Screen signed and held DOS  Labs per anesthesia protocol: none  EK20 accetpable per anesthesia protocol    A negative Covid swab result is required to proceed with surgery; The testing center is located at the [address], [place]. An appointment is required therefore the patient will be contacted by the Covid swab team. The testing clinic is closed from  for lunch and on weekends. For questions or concerns the patient should call [phone]. Appointment date/time 20 found in EHR and provided to patient. Hospital approved surgical skin cleanser and instructions given per hospital policy. Patient provided with and instructed on educational handouts including Guide to Surgery, Pain Management, Hand Hygiene, Blood Transfusion Education, and [facility] Brochure. Patient answered medical/surgical history questions at their best of ability. All prior to admission medications documented in [facility]. Original medication prescription bottle visualized during patient appointment. Patient instructed to hold all vitamins 7 days prior to surgery and NSAIDS 5 days prior to surgery, patient verbalized understanding. Patient teach back successful and patient demonstrates knowledge of instructions.

## 2020-08-19 NOTE — PERIOP NOTES
PLEASE CONTINUE TAKING ALL PRESCRIPTION MEDICATIONS UP TO THE DAY OF SURGERY UNLESS OTHERWISE DIRECTED BELOW. DISCONTINUE all vitamins and supplements 7 days prior to surgery. DISCONTINUE Non-Steriodal Anti-Inflammatory (NSAIDS) such as Advil and Aleve 5 days prior to surgery. Home Medications to take  the day of surgery    None           Home Medications   to Hold   Vitamins, Supplements, and Herbals. Non-Steriodal Anti-Inflammatory (NSAIDS) such as Advil and Aleve. Comments          *Visitor policy of 1 visitor per patient discussed. Please do not bring home medications with you on the day of surgery unless otherwise directed by your nurse. If you are instructed to bring home medications, please give them to your nurse as they will be administered by the nursing staff. If you have any questions, please call [facility] [phone] or [address] [place] [address] [phone]. A copy of this note was provided to the patient for reference.

## 2020-08-21 LAB
BACTERIA SPEC CULT: NORMAL
SERVICE CMNT-IMP: NORMAL

## 2020-08-26 ENCOUNTER — ANESTHESIA EVENT (OUTPATIENT)
Dept: SURGERY | Age: 70
End: 2020-08-26
Payer: MEDICARE

## 2020-08-27 ENCOUNTER — HOSPITAL ENCOUNTER (OUTPATIENT)
Age: 70
Discharge: HOME | End: 2020-08-28
Attending: UROLOGY | Admitting: UROLOGY
Payer: MEDICARE

## 2020-08-27 ENCOUNTER — ANESTHESIA (OUTPATIENT)
Dept: SURGERY | Age: 70
End: 2020-08-27
Payer: MEDICARE

## 2020-08-27 DIAGNOSIS — Z90.79 S/P PROSTATECTOMY: Primary | ICD-10-CM

## 2020-08-27 PROBLEM — C61 PROSTATE CANCER (HCC): Status: ACTIVE | Noted: 2020-08-27

## 2020-08-27 LAB
ABO + RH BLD: NORMAL
BLOOD GROUP ANTIBODIES SERPL: NORMAL
HCT VFR BLD AUTO: 41.5 % (ref 41.1–50.3)
HGB BLD-MCNC: 13.8 G/DL (ref 13.6–17.2)
SPECIMEN EXP DATE BLD: NORMAL

## 2020-08-27 PROCEDURE — 77030019908 HC STETH ESOPH SIMS -A: Performed by: ANESTHESIOLOGY

## 2020-08-27 PROCEDURE — 74011000258 HC RX REV CODE- 258: Performed by: UROLOGY

## 2020-08-27 PROCEDURE — 36415 COLL VENOUS BLD VENIPUNCTURE: CPT

## 2020-08-27 PROCEDURE — 77030008756 HC TU IRR SUC STRY -B: Performed by: UROLOGY

## 2020-08-27 PROCEDURE — 74011250636 HC RX REV CODE- 250/636: Performed by: UROLOGY

## 2020-08-27 PROCEDURE — 77030035277 HC OBTRTR BLDELSS DISP INTU -B: Performed by: UROLOGY

## 2020-08-27 PROCEDURE — 77030010507 HC ADH SKN DERMBND J&J -B: Performed by: UROLOGY

## 2020-08-27 PROCEDURE — 77030037088 HC TUBE ENDOTRACH ORAL NSL COVD-A: Performed by: ANESTHESIOLOGY

## 2020-08-27 PROCEDURE — 77030008522 HC TBNG INSUF LAPRO STRY -B: Performed by: UROLOGY

## 2020-08-27 PROCEDURE — 74011000250 HC RX REV CODE- 250: Performed by: UROLOGY

## 2020-08-27 PROCEDURE — 86900 BLOOD TYPING SEROLOGIC ABO: CPT

## 2020-08-27 PROCEDURE — 77030013567 HC DRN WND RESERV BARD -A: Performed by: UROLOGY

## 2020-08-27 PROCEDURE — 77030002916 HC SUT ETHLN J&J -A: Performed by: UROLOGY

## 2020-08-27 PROCEDURE — 76060000036 HC ANESTHESIA 2.5 TO 3 HR: Performed by: UROLOGY

## 2020-08-27 PROCEDURE — 77030002933 HC SUT MCRYL J&J -A: Performed by: UROLOGY

## 2020-08-27 PROCEDURE — 74011000250 HC RX REV CODE- 250: Performed by: REGISTERED NURSE

## 2020-08-27 PROCEDURE — 85018 HEMOGLOBIN: CPT

## 2020-08-27 PROCEDURE — 77030020703 HC SEAL CANN DISP INTU -B: Performed by: UROLOGY

## 2020-08-27 PROCEDURE — 77030016151 HC PROTCTR LNS DFOG COVD -B: Performed by: UROLOGY

## 2020-08-27 PROCEDURE — 77030040831 HC BAG URINE DRNG MDII -A: Performed by: UROLOGY

## 2020-08-27 PROCEDURE — 77030034696 HC CATH URETH FOL 2W BARD -A: Performed by: UROLOGY

## 2020-08-27 PROCEDURE — 74011250637 HC RX REV CODE- 250/637: Performed by: UROLOGY

## 2020-08-27 PROCEDURE — 77030003575 HC NDL INSUF ENDOPTH J&J -B: Performed by: UROLOGY

## 2020-08-27 PROCEDURE — 77030007955 HC PCH ENDOSC SPEC J&J -B: Performed by: UROLOGY

## 2020-08-27 PROCEDURE — 77030037171 HC CLP LAPRO ABS SGL COVD -B: Performed by: UROLOGY

## 2020-08-27 PROCEDURE — 74011250636 HC RX REV CODE- 250/636: Performed by: ANESTHESIOLOGY

## 2020-08-27 PROCEDURE — 74011250636 HC RX REV CODE- 250/636: Performed by: REGISTERED NURSE

## 2020-08-27 PROCEDURE — 74011250637 HC RX REV CODE- 250/637: Performed by: ANESTHESIOLOGY

## 2020-08-27 PROCEDURE — 77030005518 HC CATH URETH FOL 2W BARD -B: Performed by: UROLOGY

## 2020-08-27 PROCEDURE — 77030040361 HC SLV COMPR DVT MDII -B: Performed by: UROLOGY

## 2020-08-27 PROCEDURE — 77030008608 HC TRCR ENDOSC SMTH AMR -B: Performed by: UROLOGY

## 2020-08-27 PROCEDURE — 76010000877 HC OR TIME 2.5 TO 3HR INTENSV - TIER 2: Performed by: UROLOGY

## 2020-08-27 PROCEDURE — 77030040922 HC BLNKT HYPOTHRM STRY -A: Performed by: ANESTHESIOLOGY

## 2020-08-27 PROCEDURE — 88307 TISSUE EXAM BY PATHOLOGIST: CPT

## 2020-08-27 PROCEDURE — 77030039425 HC BLD LARYNG TRULITE DISP TELE -A: Performed by: ANESTHESIOLOGY

## 2020-08-27 PROCEDURE — 77030008603 HC TRCR ENDOSC EPATH J&J -C: Performed by: UROLOGY

## 2020-08-27 PROCEDURE — 77030012406 HC DRN WND PENRS BARD -A: Performed by: UROLOGY

## 2020-08-27 PROCEDURE — 77030000038 HC TIP SCIS LAPSCP SURI -B: Performed by: UROLOGY

## 2020-08-27 PROCEDURE — 77030031139 HC SUT VCRL2 J&J -A: Performed by: UROLOGY

## 2020-08-27 PROCEDURE — 88305 TISSUE EXAM BY PATHOLOGIST: CPT

## 2020-08-27 PROCEDURE — 76210000006 HC OR PH I REC 0.5 TO 1 HR: Performed by: UROLOGY

## 2020-08-27 RX ORDER — ONDANSETRON 2 MG/ML
4 INJECTION INTRAMUSCULAR; INTRAVENOUS
Status: DISCONTINUED | OUTPATIENT
Start: 2020-08-27 | End: 2020-08-28 | Stop reason: HOSPADM

## 2020-08-27 RX ORDER — ROCURONIUM BROMIDE 10 MG/ML
INJECTION, SOLUTION INTRAVENOUS AS NEEDED
Status: DISCONTINUED | OUTPATIENT
Start: 2020-08-27 | End: 2020-08-27 | Stop reason: HOSPADM

## 2020-08-27 RX ORDER — ATROPA BELLADONNA AND OPIUM 16.2; 6 MG/1; MG/1
1 SUPPOSITORY RECTAL
Status: DISCONTINUED | OUTPATIENT
Start: 2020-08-27 | End: 2020-08-28 | Stop reason: HOSPADM

## 2020-08-27 RX ORDER — LIDOCAINE HYDROCHLORIDE 10 MG/ML
0.1 INJECTION INFILTRATION; PERINEURAL AS NEEDED
Status: DISCONTINUED | OUTPATIENT
Start: 2020-08-27 | End: 2020-08-27 | Stop reason: HOSPADM

## 2020-08-27 RX ORDER — GLYCOPYRROLATE 0.2 MG/ML
INJECTION INTRAMUSCULAR; INTRAVENOUS AS NEEDED
Status: DISCONTINUED | OUTPATIENT
Start: 2020-08-27 | End: 2020-08-27 | Stop reason: HOSPADM

## 2020-08-27 RX ORDER — ZOLPIDEM TARTRATE 5 MG/1
5 TABLET ORAL
Status: DISCONTINUED | OUTPATIENT
Start: 2020-08-27 | End: 2020-08-28 | Stop reason: HOSPADM

## 2020-08-27 RX ORDER — DOCUSATE SODIUM 100 MG/1
100 CAPSULE, LIQUID FILLED ORAL 2 TIMES DAILY
Status: DISCONTINUED | OUTPATIENT
Start: 2020-08-27 | End: 2020-08-28 | Stop reason: HOSPADM

## 2020-08-27 RX ORDER — FENTANYL CITRATE 50 UG/ML
INJECTION, SOLUTION INTRAMUSCULAR; INTRAVENOUS AS NEEDED
Status: DISCONTINUED | OUTPATIENT
Start: 2020-08-27 | End: 2020-08-27 | Stop reason: HOSPADM

## 2020-08-27 RX ORDER — SODIUM CHLORIDE, SODIUM LACTATE, POTASSIUM CHLORIDE, CALCIUM CHLORIDE 600; 310; 30; 20 MG/100ML; MG/100ML; MG/100ML; MG/100ML
100 INJECTION, SOLUTION INTRAVENOUS CONTINUOUS
Status: DISCONTINUED | OUTPATIENT
Start: 2020-08-27 | End: 2020-08-27 | Stop reason: HOSPADM

## 2020-08-27 RX ORDER — HYDROMORPHONE HYDROCHLORIDE 2 MG/ML
INJECTION, SOLUTION INTRAMUSCULAR; INTRAVENOUS; SUBCUTANEOUS AS NEEDED
Status: DISCONTINUED | OUTPATIENT
Start: 2020-08-27 | End: 2020-08-27 | Stop reason: HOSPADM

## 2020-08-27 RX ORDER — MORPHINE SULFATE 2 MG/ML
2 INJECTION, SOLUTION INTRAMUSCULAR; INTRAVENOUS
Status: DISCONTINUED | OUTPATIENT
Start: 2020-08-27 | End: 2020-08-28 | Stop reason: HOSPADM

## 2020-08-27 RX ORDER — LIDOCAINE HYDROCHLORIDE 20 MG/ML
INJECTION, SOLUTION EPIDURAL; INFILTRATION; INTRACAUDAL; PERINEURAL AS NEEDED
Status: DISCONTINUED | OUTPATIENT
Start: 2020-08-27 | End: 2020-08-27 | Stop reason: HOSPADM

## 2020-08-27 RX ORDER — FENTANYL CITRATE 50 UG/ML
100 INJECTION, SOLUTION INTRAMUSCULAR; INTRAVENOUS ONCE
Status: DISCONTINUED | OUTPATIENT
Start: 2020-08-27 | End: 2020-08-27 | Stop reason: HOSPADM

## 2020-08-27 RX ORDER — HYDROMORPHONE HYDROCHLORIDE 2 MG/ML
0.5 INJECTION, SOLUTION INTRAMUSCULAR; INTRAVENOUS; SUBCUTANEOUS
Status: COMPLETED | OUTPATIENT
Start: 2020-08-27 | End: 2020-08-27

## 2020-08-27 RX ORDER — HYDROCODONE BITARTRATE AND ACETAMINOPHEN 7.5; 325 MG/1; MG/1
1 TABLET ORAL AS NEEDED
Status: DISCONTINUED | OUTPATIENT
Start: 2020-08-27 | End: 2020-08-27 | Stop reason: HOSPADM

## 2020-08-27 RX ORDER — PROPOFOL 10 MG/ML
INJECTION, EMULSION INTRAVENOUS AS NEEDED
Status: DISCONTINUED | OUTPATIENT
Start: 2020-08-27 | End: 2020-08-27 | Stop reason: HOSPADM

## 2020-08-27 RX ORDER — SODIUM CHLORIDE 0.9 % (FLUSH) 0.9 %
5-40 SYRINGE (ML) INJECTION AS NEEDED
Status: DISCONTINUED | OUTPATIENT
Start: 2020-08-27 | End: 2020-08-27 | Stop reason: HOSPADM

## 2020-08-27 RX ORDER — ONDANSETRON 2 MG/ML
INJECTION INTRAMUSCULAR; INTRAVENOUS AS NEEDED
Status: DISCONTINUED | OUTPATIENT
Start: 2020-08-27 | End: 2020-08-27 | Stop reason: HOSPADM

## 2020-08-27 RX ORDER — OXYBUTYNIN CHLORIDE 5 MG/1
5 TABLET ORAL
Status: DISCONTINUED | OUTPATIENT
Start: 2020-08-27 | End: 2020-08-28 | Stop reason: HOSPADM

## 2020-08-27 RX ORDER — SODIUM CHLORIDE 0.9 % (FLUSH) 0.9 %
5-40 SYRINGE (ML) INJECTION EVERY 8 HOURS
Status: DISCONTINUED | OUTPATIENT
Start: 2020-08-27 | End: 2020-08-27 | Stop reason: HOSPADM

## 2020-08-27 RX ORDER — ACETAMINOPHEN 325 MG/1
650 TABLET ORAL
Status: DISCONTINUED | OUTPATIENT
Start: 2020-08-27 | End: 2020-08-28 | Stop reason: HOSPADM

## 2020-08-27 RX ORDER — NEOSTIGMINE METHYLSULFATE 1 MG/ML
INJECTION, SOLUTION INTRAVENOUS AS NEEDED
Status: DISCONTINUED | OUTPATIENT
Start: 2020-08-27 | End: 2020-08-27 | Stop reason: HOSPADM

## 2020-08-27 RX ORDER — FAMOTIDINE 20 MG/1
20 TABLET, FILM COATED ORAL ONCE
Status: COMPLETED | OUTPATIENT
Start: 2020-08-27 | End: 2020-08-27

## 2020-08-27 RX ORDER — DEXAMETHASONE SODIUM PHOSPHATE 4 MG/ML
INJECTION, SOLUTION INTRA-ARTICULAR; INTRALESIONAL; INTRAMUSCULAR; INTRAVENOUS; SOFT TISSUE AS NEEDED
Status: DISCONTINUED | OUTPATIENT
Start: 2020-08-27 | End: 2020-08-27 | Stop reason: HOSPADM

## 2020-08-27 RX ORDER — DEXTROSE MONOHYDRATE AND SODIUM CHLORIDE 5; .45 G/100ML; G/100ML
150 INJECTION, SOLUTION INTRAVENOUS CONTINUOUS
Status: DISCONTINUED | OUTPATIENT
Start: 2020-08-27 | End: 2020-08-28

## 2020-08-27 RX ORDER — CEFAZOLIN SODIUM/WATER 2 G/20 ML
2 SYRINGE (ML) INTRAVENOUS
Status: COMPLETED | OUTPATIENT
Start: 2020-08-27 | End: 2020-08-27

## 2020-08-27 RX ORDER — SODIUM CHLORIDE 9 MG/ML
25 INJECTION, SOLUTION INTRAVENOUS CONTINUOUS
Status: DISCONTINUED | OUTPATIENT
Start: 2020-08-27 | End: 2020-08-27 | Stop reason: HOSPADM

## 2020-08-27 RX ORDER — MIDAZOLAM HYDROCHLORIDE 1 MG/ML
2 INJECTION, SOLUTION INTRAMUSCULAR; INTRAVENOUS
Status: DISCONTINUED | OUTPATIENT
Start: 2020-08-27 | End: 2020-08-27 | Stop reason: HOSPADM

## 2020-08-27 RX ORDER — OXYCODONE HYDROCHLORIDE 5 MG/1
5 TABLET ORAL
Status: DISCONTINUED | OUTPATIENT
Start: 2020-08-27 | End: 2020-08-27 | Stop reason: HOSPADM

## 2020-08-27 RX ORDER — CALCIUM CARBONATE 200(500)MG
400 TABLET,CHEWABLE ORAL AS NEEDED
Status: DISCONTINUED | OUTPATIENT
Start: 2020-08-27 | End: 2020-08-28 | Stop reason: HOSPADM

## 2020-08-27 RX ORDER — BUPIVACAINE HYDROCHLORIDE 2.5 MG/ML
INJECTION, SOLUTION EPIDURAL; INFILTRATION; INTRACAUDAL AS NEEDED
Status: DISCONTINUED | OUTPATIENT
Start: 2020-08-27 | End: 2020-08-27 | Stop reason: HOSPADM

## 2020-08-27 RX ORDER — HYDROCODONE BITARTRATE AND ACETAMINOPHEN 7.5; 325 MG/1; MG/1
1 TABLET ORAL
Status: DISCONTINUED | OUTPATIENT
Start: 2020-08-27 | End: 2020-08-28 | Stop reason: HOSPADM

## 2020-08-27 RX ORDER — NALOXONE HYDROCHLORIDE 0.4 MG/ML
0.1 INJECTION, SOLUTION INTRAMUSCULAR; INTRAVENOUS; SUBCUTANEOUS AS NEEDED
Status: DISCONTINUED | OUTPATIENT
Start: 2020-08-27 | End: 2020-08-27 | Stop reason: HOSPADM

## 2020-08-27 RX ORDER — OLMESARTAN MEDOXOMIL 20 MG/1
20 TABLET ORAL DAILY
Status: DISCONTINUED | OUTPATIENT
Start: 2020-08-27 | End: 2020-08-28 | Stop reason: HOSPADM

## 2020-08-27 RX ORDER — DIPHENHYDRAMINE HYDROCHLORIDE 50 MG/ML
12.5 INJECTION, SOLUTION INTRAMUSCULAR; INTRAVENOUS
Status: DISCONTINUED | OUTPATIENT
Start: 2020-08-27 | End: 2020-08-28 | Stop reason: HOSPADM

## 2020-08-27 RX ADMIN — PROPOFOL 200 MG: 10 INJECTION, EMULSION INTRAVENOUS at 07:42

## 2020-08-27 RX ADMIN — HYDROMORPHONE HYDROCHLORIDE 0.5 MG: 2 INJECTION INTRAMUSCULAR; INTRAVENOUS; SUBCUTANEOUS at 10:30

## 2020-08-27 RX ADMIN — SODIUM CHLORIDE, SODIUM LACTATE, POTASSIUM CHLORIDE, AND CALCIUM CHLORIDE 100 ML/HR: 600; 310; 30; 20 INJECTION, SOLUTION INTRAVENOUS at 07:06

## 2020-08-27 RX ADMIN — ROCURONIUM BROMIDE 40 MG: 10 INJECTION, SOLUTION INTRAVENOUS at 07:43

## 2020-08-27 RX ADMIN — ATROPA BELLADONNA AND OPIUM 1 SUPPOSITORY: 16.2; 6 SUPPOSITORY RECTAL at 16:13

## 2020-08-27 RX ADMIN — GLYCOPYRROLATE 0.2 MG: 0.2 INJECTION, SOLUTION INTRAMUSCULAR; INTRAVENOUS at 08:09

## 2020-08-27 RX ADMIN — GLYCOPYRROLATE 0.8 MG: 0.2 INJECTION, SOLUTION INTRAMUSCULAR; INTRAVENOUS at 09:52

## 2020-08-27 RX ADMIN — ATROPA BELLADONNA AND OPIUM 1 SUPPOSITORY: 16.2; 6 SUPPOSITORY RECTAL at 10:26

## 2020-08-27 RX ADMIN — DEXAMETHASONE SODIUM PHOSPHATE 4 MG: 4 INJECTION, SOLUTION INTRAMUSCULAR; INTRAVENOUS at 07:58

## 2020-08-27 RX ADMIN — SODIUM CHLORIDE 1000 MG: 900 INJECTION, SOLUTION INTRAVENOUS at 16:13

## 2020-08-27 RX ADMIN — LIDOCAINE HYDROCHLORIDE 80 MG: 20 INJECTION, SOLUTION EPIDURAL; INFILTRATION; INTRACAUDAL; PERINEURAL at 07:42

## 2020-08-27 RX ADMIN — DOCUSATE SODIUM 100 MG: 100 CAPSULE, LIQUID FILLED ORAL at 12:10

## 2020-08-27 RX ADMIN — Medication 2 G: at 07:52

## 2020-08-27 RX ADMIN — ROCURONIUM BROMIDE 10 MG: 10 INJECTION, SOLUTION INTRAVENOUS at 08:35

## 2020-08-27 RX ADMIN — HYDROCODONE BITARTRATE AND ACETAMINOPHEN 1 TABLET: 7.5; 325 TABLET ORAL at 18:31

## 2020-08-27 RX ADMIN — HYDROMORPHONE HYDROCHLORIDE 0.6 MG: 2 INJECTION INTRAMUSCULAR; INTRAVENOUS; SUBCUTANEOUS at 09:42

## 2020-08-27 RX ADMIN — ROCURONIUM BROMIDE 10 MG: 10 INJECTION, SOLUTION INTRAVENOUS at 09:28

## 2020-08-27 RX ADMIN — FAMOTIDINE 20 MG: 20 TABLET, FILM COATED ORAL at 07:06

## 2020-08-27 RX ADMIN — SODIUM CHLORIDE, SODIUM LACTATE, POTASSIUM CHLORIDE, AND CALCIUM CHLORIDE: 600; 310; 30; 20 INJECTION, SOLUTION INTRAVENOUS at 09:55

## 2020-08-27 RX ADMIN — HYDROMORPHONE HYDROCHLORIDE 0.5 MG: 2 INJECTION INTRAMUSCULAR; INTRAVENOUS; SUBCUTANEOUS at 10:25

## 2020-08-27 RX ADMIN — HYDROMORPHONE HYDROCHLORIDE 0.5 MG: 2 INJECTION INTRAMUSCULAR; INTRAVENOUS; SUBCUTANEOUS at 10:33

## 2020-08-27 RX ADMIN — Medication 5 MG: at 09:52

## 2020-08-27 RX ADMIN — ROCURONIUM BROMIDE 10 MG: 10 INJECTION, SOLUTION INTRAVENOUS at 07:50

## 2020-08-27 RX ADMIN — OLMESARTAN MEDOXOMIL 20 MG: 20 TABLET, FILM COATED ORAL at 12:10

## 2020-08-27 RX ADMIN — HYDROMORPHONE HYDROCHLORIDE 0.5 MG: 2 INJECTION INTRAMUSCULAR; INTRAVENOUS; SUBCUTANEOUS at 10:36

## 2020-08-27 RX ADMIN — ONDANSETRON 4 MG: 2 INJECTION INTRAMUSCULAR; INTRAVENOUS at 09:40

## 2020-08-27 RX ADMIN — FENTANYL CITRATE 100 MCG: 50 INJECTION INTRAMUSCULAR; INTRAVENOUS at 07:42

## 2020-08-27 RX ADMIN — CALCIUM CARBONATE (ANTACID) CHEW TAB 500 MG 400 MG: 500 CHEW TAB at 18:31

## 2020-08-27 RX ADMIN — DOCUSATE SODIUM 100 MG: 100 CAPSULE, LIQUID FILLED ORAL at 17:24

## 2020-08-27 RX ADMIN — DEXTROSE MONOHYDRATE AND SODIUM CHLORIDE 150 ML/HR: 5; .45 INJECTION, SOLUTION INTRAVENOUS at 12:10

## 2020-08-27 NOTE — OP NOTES
[address] [place] [address]  OPERATIVE REPORT    Name:  Bere Cadena  MR#:  [id number]  :  1950  ACCOUNT #:  [de-identified]  DATE OF SERVICE:  2020    PREOPERATIVE DIAGNOSIS:  Prostate cancer. POSTOPERATIVE DIAGNOSIS:  Prostate cancer. PROCEDURE PERFORMED:  Robotic-assisted laparoscopic radical prostatectomy and bilateral pelvic lymph node dissection. SURGEON:  Boone Brenner DO    ASSISTANT:  None. ANESTHESIA:  General.    COMPLICATIONS:  None immediate. SPECIMENS REMOVED:  Prostate and bilateral pelvic lymph nodes. IMPLANTS:  None    ESTIMATED BLOOD LOSS:  50 mL    CLINICAL HISTORY:  This is a 82-year-old gentleman who was recently diagnosed with Filley 6 and 7 adenocarcinoma of the prostate on 2020. His pre-biopsy PSA was 6.48 and clinical stage is T1c. All risks, benefits and alternatives to the above-mentioned procedure have been reviewed and he is willing to proceed at this time. DESCRIPTION OF OPERATIVE PROCEDURE:  Patient consent was obtained. The patient was brought back to the operating room, at which time he was placed in the supine position. After the uneventful induction of general anesthesia, he was then placed in a low lithotomy position. His genital and abdominal areas were prepped and draped and a sterile field applied. An 18-[place] Martin catheter was placed to dependent drainage. A small periumbilical incision was made and the Veress needle was inserted into the abdominal cavity without event. The abdomen was then insufflated with CO2. Ports were then placed in a standard robotic prostatectomy fashion under direct vision. Some right lower quadrant adhesions were identified. These were taken down sharply using the laparoscopic scissors prior to docking of the robot. Following takedown of these adhesions, the patient was then placed in a steep Trendelenburg position and the robot was docked.   Some lower colonic adhesions were taken down sharply using the robotic scissors and the lateral sigmoid colonic attachments were taken down in order to evacuate the bowel out of the pelvis. The space of Retzius was entered by dropping the bladder posteriorly. The endopelvic fascia was incised bilaterally. The dorsal venous complex was dissected out and oversewn using 2-0 Vicryl in a figure-of-eight fashion. A back stitch was also placed near the bladder neck. The bladder neck was then transected from the prostate using electrocautery. The catheter was then identified in the midline and retracted anteriorly to aid in posterior dissection. Following division of the posterior bladder neck, dissection was then carried posteriorly to the seminal vesicles and vas deferens bilaterally. These structures were dissected out and divided. A bilateral retrograde nerve-sparing procedure was then performed using athermal technique. Pedicles were controlled using Lapro-Clips once again utilizing athermal technique. The neurovascular bundles in the rectum were then swept off the prostate bluntly in an antegrade fashion. The dorsal vein was divided using electrocautery and the urethra was transected using the robotic scissors. The prostate was then placed within the EndoCatch bag and placed within the lower pelvis. The pelvis was thoroughly irrigated. No active bleeding was seen. The urethrovesical anastomosis was accomplished using 3-0 double-arm Monocryl in a running fashion. A 20-Persian Martin catheter was placed at the completion of this anastomosis. The bladder was thoroughly irrigated. No obvious leak was seen. The bladder was then tacked anteriorly to the endopelvic fascia bilaterally using 3-0 Vicryl in a simple interrupted fashion. A bilateral pelvic lymph node dissection was then performed.   Boundaries of my dissection included the inguinal ligament inferiorly, bifurcation of the common iliac artery superiorly, pelvic side wall laterally, and bladder medially. All jamin tissue within this plane was removed. Care was taken to avoid injury to all vascular structures as well as the obturator nerve. Following extraction of the jamin tissue, the intraabdominal pressures were lowered. No active bleeding was seen. I did elect to place a small piece of Surgicel over the left pelvic lymph node dissection site. No injury was seen in the lower pelvis. The intraabdominal pressures were raised and the robot was undocked. The prostate was removed through the periumbilical incision and a 15-STACY drain was placed through the 5-mm port site and sewn in place using 2-0 nylon suture. The midline fascia was closed using 0 Vicryl in a running fashion. Skin was closed here using 4-0 Monocryl in a running subcuticular fashion. All other ports were closed using Dermabond. The patient tolerated the procedure well. Estimated blood loss was 50 mL.       DO PORSHA [id number]  D:  08/27/2020 10:21  T:  08/27/2020 14:42  JOB #:  [id number]

## 2020-08-27 NOTE — ANESTHESIA POSTPROCEDURE EVALUATION
Procedure(s):  PROSTATECTOMY ROBOTIC ASSISTED WITH BILATERAL PELVIC LYMPH NODE DISSECTION. general    Anesthesia Post Evaluation      Multimodal analgesia: multimodal analgesia used between 6 hours prior to anesthesia start to PACU discharge  Patient location during evaluation: PACU  Patient participation: complete - patient participated  Level of consciousness: awake and alert  Pain management: adequate  Airway patency: patent  Anesthetic complications: no  Cardiovascular status: acceptable  Respiratory status: acceptable  Hydration status: acceptable  Post anesthesia nausea and vomiting:  none      INITIAL Post-op Vital signs:   Vitals Value Taken Time   /70 8/27/2020 10:56 AM   Temp 36.3 °C (97.3 °F) 8/27/2020 10:13 AM   Pulse 79 8/27/2020 10:59 AM   Resp 16 8/27/2020 10:46 AM   SpO2 96 % 8/27/2020 10:59 AM   Vitals shown include unvalidated device data.

## 2020-08-27 NOTE — PROGRESS NOTES
TRANSFER - IN REPORT:    Verbal report received from BROOKLYNN Mata(name) on Arjeimyamisha KhalilSonia.  being received from [facility]) for routine progression of care      Report consisted of patients Situation, Background, Assessment and   Recommendations(SBAR). Information from the following report(s) SBAR was reviewed with the receiving nurse. Opportunity for questions and clarification was provided. Assessment completed upon patients arrival to unit and care assumed.

## 2020-08-27 NOTE — PROGRESS NOTES
08/27/20 1120   Dual Skin Pressure Injury Assessment   Dual Skin Pressure Injury Assessment X   Second Care Provider (Based on [address] [place] [address]) Alexy Smith RN   Skin Integumentary   Skin Integumentary (WDL) X    Pressure  Injury Documentation No Pressure Injury Noted-Pressure Ulcer Prevention Initiated   Skin Color Appropriate for ethnicity   Skin Condition/Temp Dry; Warm   Skin Integrity Incision (comment)  (5 abd sites, C/D/I)     Primary Nurse Xi Booth RN and Alexy Smith RN performed a dual skin assessment on this patient Impairment noted- see wound doc flow sheet. Patient oriented to room and call light. All needs met at this time. Will continue to monitor. Woody score is 23.

## 2020-08-27 NOTE — BRIEF OP NOTE
Brief Postoperative Note    Patient: Stan Molina   YOB: 1950  MRN: [id number]    Date of Procedure: 8/27/2020     Pre-Op Diagnosis: Prostate cancer ([address] Onita Rushing    Post-Op Diagnosis: Same    Procedure(s):  LAP RADICAL PROSTATECTOMY ROBOTIC ASSISTED WITH BILATERAL PELVIC LYMPH NODE DISSECTION    Surgeon(s):  Sterrett, Franceen Angelucci, DO    Surgical Assistant: None    Anesthesia: General     Estimated Blood Loss (mL): 17MB    Complications: none immediate    Specimens:   ID Type Source Tests Collected by Time Destination   1 : prostate Preservative Pelvis  [facility],  8/27/2020 4054 Pathology   2 : right pelvic nodes Preservative Pelvis  [facility],  8/27/2020 7557 Pathology   3 : left pelvic nodes Preservative Pelvis  [facility],  8/27/2020 0456 Pathology        Implants: * No implants in log *    Drains:   Arturo-Atkinson Drain 08/27/20 Right;Mid Abdomen (Active)       Findings: see op note    Electronically Signed by Cara Julien DO on 8/27/2020 at 10:11 AM

## 2020-08-27 NOTE — PERIOP NOTES
TRANSFER - OUT REPORT:    Verbal report given to Alem Orr RN  on Kandace Meraz.  being transferred to [phone] for routine post - op       Report consisted of patients Situation, Background, Assessment and   Recommendations(SBAR). Information from the following report(s) SBAR, OR Summary, Procedure Summary, Intake/Output, MAR and Recent Results was reviewed with the receiving nurse. Lines:   Peripheral IV 08/27/20 Left Hand (Active)   Site Assessment Clean, dry, & intact 08/27/20 1101   Phlebitis Assessment 0 08/27/20 1101   Infiltration Assessment 0 08/27/20 1101   Dressing Status Clean, dry, & intact 08/27/20 1101   Dressing Type Tape;Transparent 08/27/20 1101   Hub Color/Line Status Patent 08/27/20 1101   Action Taken Blood drawn 08/27/20 9728        Opportunity for questions and clarification was provided. Patient transported with:   O2 @ 2 liters    VTE prophylaxis orders have been written for Kandace Veliz .    Patient and family given floor number and nurses name. Family updated re: pt status after security code verified.

## 2020-08-28 VITALS
BODY MASS INDEX: 27.37 KG/M2 | OXYGEN SATURATION: 97 % | WEIGHT: 184.8 LBS | SYSTOLIC BLOOD PRESSURE: 104 MMHG | HEIGHT: 69 IN | HEART RATE: 79 BPM | DIASTOLIC BLOOD PRESSURE: 66 MMHG | TEMPERATURE: 97.7 F | RESPIRATION RATE: 18 BRPM

## 2020-08-28 LAB
ANION GAP SERPL CALC-SCNC: 3 MMOL/L (ref 7–16)
BUN SERPL-MCNC: 18 MG/DL (ref 8–23)
CALCIUM SERPL-MCNC: 8.4 MG/DL (ref 8.3–10.4)
CHLORIDE SERPL-SCNC: 109 MMOL/L (ref 98–107)
CO2 SERPL-SCNC: 29 MMOL/L (ref 21–32)
CREAT SERPL-MCNC: 1.65 MG/DL (ref 0.8–1.5)
GLUCOSE SERPL-MCNC: 109 MG/DL (ref 65–100)
HCT VFR BLD AUTO: 38.9 % (ref 41.1–50.3)
HGB BLD-MCNC: 12.5 G/DL (ref 13.6–17.2)
POTASSIUM SERPL-SCNC: 4.6 MMOL/L (ref 3.5–5.1)
SODIUM SERPL-SCNC: 141 MMOL/L (ref 136–145)

## 2020-08-28 PROCEDURE — 94760 N-INVAS EAR/PLS OXIMETRY 1: CPT

## 2020-08-28 PROCEDURE — 74011250636 HC RX REV CODE- 250/636: Performed by: UROLOGY

## 2020-08-28 PROCEDURE — 85018 HEMOGLOBIN: CPT

## 2020-08-28 PROCEDURE — 74011250637 HC RX REV CODE- 250/637: Performed by: UROLOGY

## 2020-08-28 PROCEDURE — 74011000258 HC RX REV CODE- 258: Performed by: NURSE PRACTITIONER

## 2020-08-28 PROCEDURE — 74011000258 HC RX REV CODE- 258: Performed by: UROLOGY

## 2020-08-28 PROCEDURE — 36415 COLL VENOUS BLD VENIPUNCTURE: CPT

## 2020-08-28 PROCEDURE — 77030012865 HC BG URIN LEG MDII -A

## 2020-08-28 PROCEDURE — 80048 BASIC METABOLIC PNL TOTAL CA: CPT

## 2020-08-28 RX ORDER — HYDROCODONE BITARTRATE AND ACETAMINOPHEN 5; 325 MG/1; MG/1
1 TABLET ORAL
Qty: 20 TAB | Refills: 0 | Status: SHIPPED | OUTPATIENT
Start: 2020-08-28 | End: 2020-08-31

## 2020-08-28 RX ORDER — DEXTROSE MONOHYDRATE AND SODIUM CHLORIDE 5; .45 G/100ML; G/100ML
75 INJECTION, SOLUTION INTRAVENOUS CONTINUOUS
Status: DISCONTINUED | OUTPATIENT
Start: 2020-08-28 | End: 2020-08-28 | Stop reason: HOSPADM

## 2020-08-28 RX ORDER — CIPROFLOXACIN 500 MG/1
500 TABLET ORAL 2 TIMES DAILY
Qty: 14 TAB | Refills: 0 | Status: SHIPPED | OUTPATIENT
Start: 2020-08-28 | End: 2020-09-29 | Stop reason: ALTCHOICE

## 2020-08-28 RX ORDER — DOCUSATE SODIUM 100 MG/1
100 CAPSULE, LIQUID FILLED ORAL 2 TIMES DAILY
Qty: 60 CAP | Refills: 2 | Status: SHIPPED | OUTPATIENT
Start: 2020-08-28 | End: 2020-11-26

## 2020-08-28 RX ADMIN — DEXTROSE MONOHYDRATE AND SODIUM CHLORIDE 150 ML/HR: 5; .45 INJECTION, SOLUTION INTRAVENOUS at 01:52

## 2020-08-28 RX ADMIN — ACETAMINOPHEN 650 MG: 325 TABLET, FILM COATED ORAL at 10:44

## 2020-08-28 RX ADMIN — DEXTROSE MONOHYDRATE AND SODIUM CHLORIDE 150 ML/HR: 5; .45 INJECTION, SOLUTION INTRAVENOUS at 08:34

## 2020-08-28 RX ADMIN — OLMESARTAN MEDOXOMIL 20 MG: 20 TABLET, FILM COATED ORAL at 08:35

## 2020-08-28 RX ADMIN — DOCUSATE SODIUM 100 MG: 100 CAPSULE, LIQUID FILLED ORAL at 08:34

## 2020-08-28 RX ADMIN — OXYBUTYNIN CHLORIDE 5 MG: 5 TABLET ORAL at 10:44

## 2020-08-28 RX ADMIN — DEXTROSE MONOHYDRATE AND SODIUM CHLORIDE 75 ML/HR: 5; .45 INJECTION, SOLUTION INTRAVENOUS at 10:25

## 2020-08-28 RX ADMIN — SODIUM CHLORIDE 1000 MG: 900 INJECTION, SOLUTION INTRAVENOUS at 08:34

## 2020-08-28 RX ADMIN — HYDROCODONE BITARTRATE AND ACETAMINOPHEN 1 TABLET: 7.5; 325 TABLET ORAL at 00:40

## 2020-08-28 RX ADMIN — SODIUM CHLORIDE 1000 MG: 900 INJECTION, SOLUTION INTRAVENOUS at 00:33

## 2020-08-28 NOTE — PROGRESS NOTES
Care Management Interventions  Mode of Transport at Discharge: Self  Transition of Care Consult (CM Consult): Discharge Planning  Physical Therapy Consult: No  Occupational Therapy Consult: No  Speech Therapy Consult: No  Confirm Follow Up Transport: Self   Resource Information Provided?: No  Discharge Location  Discharge Placement: Home    Patient to discharge home. No CM needs have been identified. All milestones for discharge have been met. Patient will transport home with family.

## 2020-08-28 NOTE — PROGRESS NOTES
Admit Date: 8/27/2020    Subjective:     Lucille Greenwood is sitting in the chair. He is tolerating clear liquids with no nausea. He has ambulated and passed flatus. Objective:     Patient Vitals for the past 8 hrs:   BP Temp Pulse Resp SpO2 Weight   08/28/20 0739 111/66 98.4 °F (36.9 °C) 73 20 95 %    08/28/20 0325 100/64 98.3 °F (36.8 °C) 78 18 91 % 184 lb 12.8 oz (83.8 kg)     08/28 0701 - 08/28 1900  In: 5972 [P.O.:240; I.V.:2990]  Out: 10 [Drains:10]  08/26 1901 - 08/28 0700  In: 1260 [P.O.:60; I.V.:1200]  Out: 5046 [Urine:1550; Drains:75]    Physical Exam:  GENERAL: alert, cooperative, no distress  LUNG: clear to auscultation bilaterally  HEART: regular rate and rhythm, S1, S2   ABDOMEN: soft, non-tender, port sites c/d/i, STACY with SS OP  NEUROLOGIC: AOx3    Data Review   Recent Results (from the past 24 hour(s))   HGB & HCT    Collection Time: 08/27/20  1:46 PM   Result Value Ref Range    HGB 13.8 13.6 - 17.2 g/dL    HCT 41.5 41.1 - 78.2 %   METABOLIC PANEL, BASIC    Collection Time: 08/28/20  6:11 AM   Result Value Ref Range    Sodium 141 136 - 145 mmol/L    Potassium 4.6 3.5 - 5.1 mmol/L    Chloride 109 (H) 98 - 107 mmol/L    CO2 29 21 - 32 mmol/L    Anion gap 3 (L) 7 - 16 mmol/L    Glucose 109 (H) 65 - 100 mg/dL    BUN 18 8 - 23 MG/DL    Creatinine 1.65 (H) 0.8 - 1.5 MG/DL    GFR est AA 54 (L) >60 ml/min/1.73m2    GFR est non-AA 44 (L) >60 ml/min/1.73m2    Calcium 8.4 8.3 - 10.4 MG/DL   HGB & HCT    Collection Time: 08/28/20  6:11 AM   Result Value Ref Range    HGB 12.5 (L) 13.6 - 17.2 g/dL    HCT 38.9 (L) 41.1 - 50.3 %       Assessment:     Active Problems:    Prostate cancer ([facility] [address] (8/27/2020)      POD 1:    POSTOPERATIVE DIAGNOSIS:  Prostate cancer.     PROCEDURE PERFORMED:  Robotic-assisted laparoscopic radical prostatectomy and bilateral pelvic lymph node dissection.       Cn 1.65  Hgb 12.5  STACY- 50 ml OP/12 hours  Afebrile, VSS    Plan:     Remove STACY drain.     Continue box catheter care teaching. Do not manipulate box catheter.     Continue to ambulate.     Advance to regular diet.     Encourage incentive spirometer use.     Decrease IVF to 75 ml/hr.     Home later today if he continues to progress    Jennie Owen NP  [facility]    I have reviewed the above note and examined the patient. I agree with the exam, assessment and plan. Remove drain. Advance diet. Home later with Box if progressing.     Belén Brenner, DO

## 2020-08-28 NOTE — PROGRESS NOTES
All hourly rounds completed and all needs have been met. Patient is resting in bed and slept throughout the night. Patient received pain medication once during the night per MAR. Patient's STACY drained 50 mL of sanguineous fluid. Urine in box bag is dark yellow. Patient has passed gas, but has not had a bowel movement post surgery. Will continue to monitor and report to the oncoming nurse.

## 2020-08-28 NOTE — PROGRESS NOTES
Pt tolerating solid foods, continues to pass flatus. Abd soft. STACY removed earlier in the shift. RN to teach box care. D/C home and RTO as scheduled.

## 2020-08-28 NOTE — DISCHARGE INSTRUCTIONS
Patient Education        Learning About Urinary Catheter Care to Prevent Infection  What is a urinary catheter? A urinary catheter is a flexible plastic tube used to drain urine from your bladder when you can't urinate on your own. The catheter allows urine to drain from the bladder into a bag. Two types of drainage bags may be used with a urinary catheter. · A bedside bag is a large bag that you can hang on the side of your bed or on a chair. You can use it overnight or anytime you will be sitting or lying down for a long time. · A leg bag is a small bag that you can use during the day. It is usually attached to your thigh or calf and hidden under your clothes. Having a urinary catheter increases your risk of getting a urinary tract infection. Germs may get on the catheter and cause an infection in your bladder or kidneys. The longer you have a catheter, the more likely it is that you will get an infection. You can help prevent this problem with good hygiene and careful handling of your catheter and drainage bags. How can you help prevent infection? Take care to be clean   · Always wash your hands well before and after you handle your catheter. · Clean the skin around the catheter twice a day using soap and water. Dry with a clean towel afterward. You can shower with your catheter and drainage bag in place unless your doctor told you not to. · When you clean around the catheter, check the surrounding skin for signs of infection. Look for things like pus or irritated, swollen, red, or tender skin around the catheter. Be careful with your drainage bag   · Always keep the drainage bag below the level of your bladder. This will help keep urine from flowing back into your bladder. · Check often to see that urine is flowing through the catheter into the drainage bag. · Empty the drainage bag when it is half full. This will keep it from overflowing or backing up.   · When you empty the drainage bag, do not let the tubing or drain spout touch anything. · Keep the cap that comes with the tubing and cover the tip of the tubing when not in use. Be careful with your catheter   · Do not unhook the catheter from the drain tube until you are ready to change the tubing and bag. That could let germs get into the tube. · Make sure that the catheter tubing does not get twisted or kinked. · Do not tug or pull on the catheter. And make sure that the drainage bag does not drag or pull on the catheter. · Do not put powder or lotion on the skin around the catheter. · Talk with your doctor about your options for sexual intercourse while wearing a catheter. How do you empty a urine drainage bag? If your doctor has asked you to keep a record, write down the amount of urine in the bag before you empty it. Wash your hands before and after you touch the bag. 1. Remove the drain spout from its sleeve at the bottom of the drainage bag.  2. Open the valve on the drain spout. Let the urine flow out into the toilet or a container. Be careful not to let the tubing or drain spout touch anything. 3. After you empty the bag, close the valve. Then put the drain spout back into its sleeve at the bottom of the collection bag. How do you switch to a bedside bag for overnight use? Wash your hands before and after you handle the bags. 1. Empty the leg bag that is attached to the tubing and catheter. 2. Put a clean towel under the tubing attached to the leg bag.  3. Use an alcohol wipe to clean the tip of the tubing attached to the bedside bag.  4. To stop the flow of urine, pinch the catheter with your fingers just above the tubing connection. 5. Use a twisting motion to disconnect the leg bag tubing from the catheter. 6. Then securely connect the catheter to the tubing from the bedside bag. How can you clean a bedside drainage bag? Many people clean their bedside bag in the morning if they switch to a leg bag.   To clean a bedside drainage ba. Remove the bedside bag and attach the leg bag.  2. Fill the bedside bag with 2 parts vinegar and 3 parts water. Let it stand for 20 minutes. 3. Empty the bag, and let it air dry. When should you call for help? Call your doctor now or seek immediate medical care if:  · You have symptoms of a urinary infection. These may include:  ? Pain or burning when you urinate. ? A frequent need to urinate without being able to pass much urine. ? Pain in the flank, which is just below the rib cage and above the waist on either side of the back. ? Blood in your urine. ? A fever. · Your urine smells bad. · You see large blood clots in your urine. · No urine or very little urine is flowing into the bag for 4 or more hours. Watch closely for changes in your health, and be sure to contact your doctor if:  · The area around the catheter becomes irritated, swollen, red, or tender, or there is pus draining from it. · Urine is leaking from the place where the catheter enters your body. Follow-up care is a key part of your treatment and safety. Be sure to make and go to all appointments, and call your doctor if you are having problems. It's also a good idea to know your test results and keep a list of the medicines you take. Where can you learn more? Go to http://www.gray.com/  Enter U010 in the search box to learn more about \"Learning About Urinary Catheter Care to Prevent Infection. \"  Current as of: 2019               Content Version: 12.5  © 9118-9582 [employer]. Care instructions adapted under license by [employer] (which disclaims liability or warranty for this information). If you have questions about a medical condition or this instruction, always ask your healthcare professional. [place] [address] any warranty or liability for your use of this information.          DISCHARGE SUMMARY from Nurse    PATIENT INSTRUCTIONS:    After general anesthesia or intravenous sedation, for 24 hours or while taking prescription Narcotics:  · Limit your activities  · Do not drive and operate hazardous machinery  · Do not make important personal or business decisions  · Do  not drink alcoholic beverages  · If you have not urinated within 8 hours after discharge, please contact your surgeon on call. Report the following to your surgeon:  · Excessive pain, swelling, redness or odor of or around the surgical area  · Temperature over 100.5  · Nausea and vomiting lasting longer than 4 hours or if unable to take medications  · Any signs of decreased circulation or nerve impairment to extremity: change in color, persistent  numbness, tingling, coldness or increase pain  · Any questions    What to do at Home:  Recommended activity: Activity as tolerated,     If you experience any of the following symptoms temperature greater than 100, bloody urine,  Difficulty with urine flow, please follow up with Dr Sonia Abdi. *  Please give a list of your current medications to your Primary Care Provider. *  Please update this list whenever your medications are discontinued, doses are      changed, or new medications (including over-the-counter products) are added. *  Please carry medication information at all times in case of emergency situations. These are general instructions for a healthy lifestyle:    No smoking/ No tobacco products/ Avoid exposure to second hand smoke  Surgeon General's Warning:  Quitting smoking now greatly reduces serious risk to your health.     Obesity, smoking, and sedentary lifestyle greatly increases your risk for illness    A healthy diet, regular physical exercise & weight monitoring are important for maintaining a healthy lifestyle    You may be retaining fluid if you have a history of heart failure or if you experience any of the following symptoms:  Weight gain of 3 pounds or more overnight or 5 pounds in a week, increased swelling in our hands or feet or shortness of breath while lying flat in bed. Please call your doctor as soon as you notice any of these symptoms; do not wait until your next office visit. The discharge information has been reviewed with the patient. The patient verbalized understanding. Discharge medications reviewed with the patient and appropriate educational materials and side effects teaching were provided.   ___________________________________________________________________________________________________________________________________

## 2020-08-28 NOTE — PROGRESS NOTES
Requested a script for ditropan, notified [place]. Will call it in. Instructed box care at home. Discharge instructions reviewed with pt and wife.

## 2020-09-17 ENCOUNTER — APPOINTMENT (OUTPATIENT)
Dept: PHYSICAL THERAPY | Age: 70
End: 2020-09-17
Attending: UROLOGY

## 2020-09-30 ENCOUNTER — HOSPITAL ENCOUNTER (OUTPATIENT)
Dept: ULTRASOUND IMAGING | Age: 70
Discharge: HOME | End: 2020-09-30
Attending: FAMILY MEDICINE
Payer: MEDICARE

## 2020-09-30 DIAGNOSIS — M79.89 RIGHT LEG SWELLING: ICD-10-CM

## 2020-09-30 DIAGNOSIS — I73.9 CLAUDICATION (HCC): ICD-10-CM

## 2020-09-30 PROCEDURE — 93971 EXTREMITY STUDY: CPT

## 2020-10-02 ENCOUNTER — HOSPITAL ENCOUNTER (OUTPATIENT)
Dept: ULTRASOUND IMAGING | Age: 70
Discharge: HOME | End: 2020-10-02
Attending: FAMILY MEDICINE
Payer: MEDICARE

## 2020-10-02 PROCEDURE — 93925 LOWER EXTREMITY STUDY: CPT

## 2020-10-08 NOTE — THERAPY DISCHARGE
Laura Rowland. : 1950  [place]  Secondary: [address] at [address] and Discontinuation Summary 10/8/2020    ICD-10: Treatment Diagnosis: R27.8 Lack of coordination (muscle incoordination),   Precautions/Allergies:   Patient has no known allergies. TREATMENT PLAN:  Effective Dates: 8/10/2020 TO 2020 (90 days). Frequency/Duration: Follow up 3 weeks post-op, then resume PT 1 x weekly. Duration to be determined post-operatively. MEDICAL/REFERRING DIAGNOSIS:  Prostate cancer ([facility] [address] Ranjit Nelson   DATE OF ONSET:   REFERRING PHYSICIAN: [place] Brenner DO  MD Orders: evaluate and treat  Return MD Appointment: Pre-operative 20   DISCHARGE SUMMARY:  Mr. Birdie Ritchie will be discharged from physical therapy at this time. He did not return for follow-up care post RALP as suggested. INITIAL ASSESSMENT:  Mr. Birdie Ritchie presents with decreased coordination, strength and endurance of pelvic floor muscle pre operatively. Today he was educated on bladder health, kegel exercises, pelvic floor anatomy and role of musculature and precautions with catheter post operatively. He required verbal and visual cuing for proper activation and isolation of pelvic floor muscles. He was able to contract muscle for 5 seconds with breath holding and accessory muscle use of gluteals (minimal). He was given kegel exercises to do at home prior to surgery and once catheter is removed. He was educated on pain after surgery and precautions with kegel exercises. He was able to demonstrate improved coordination by the end of the session today.  He will continue to benefit from skilled physical therapy to address above mentioned deficits and restore normal PFM function post operatively to minimize urinary incontinence

## 2022-03-18 PROBLEM — K21.9 GERD (GASTROESOPHAGEAL REFLUX DISEASE): Status: ACTIVE | Noted: 2019-02-26

## 2022-03-18 PROBLEM — I10 ESSENTIAL HYPERTENSION: Status: ACTIVE | Noted: 2018-02-16

## 2022-03-19 PROBLEM — Z86.0100 PERSONAL HISTORY OF COLONIC POLYPS: Status: ACTIVE | Noted: 2019-02-26

## 2022-03-19 PROBLEM — C61 PROSTATE CANCER (HCC): Status: ACTIVE | Noted: 2020-08-27

## 2022-03-19 PROBLEM — Z98.890 OTHER SPECIFIED POSTPROCEDURAL STATES: Status: ACTIVE | Noted: 2019-02-26

## 2022-03-19 PROBLEM — E78.00 PURE HYPERCHOLESTEROLEMIA: Status: ACTIVE | Noted: 2018-02-16

## 2022-03-19 PROBLEM — Z86.010 PERSONAL HISTORY OF COLONIC POLYPS: Status: ACTIVE | Noted: 2019-02-26

## 2022-03-20 PROBLEM — Z87.19 HISTORY OF BARRETT'S ESOPHAGUS: Status: ACTIVE | Noted: 2019-02-26

## 2022-08-02 DIAGNOSIS — C61 MALIGNANT NEOPLASM OF PROSTATE (HCC): Primary | ICD-10-CM

## 2022-08-03 ENCOUNTER — NURSE ONLY (OUTPATIENT)
Dept: UROLOGY | Age: 72
End: 2022-08-03

## 2022-08-03 DIAGNOSIS — C61 MALIGNANT NEOPLASM OF PROSTATE (HCC): ICD-10-CM

## 2022-08-04 LAB — PSA SERPL DL<=0.01 NG/ML-MCNC: <0.006 NG/ML (ref 0–4)

## 2022-08-10 ENCOUNTER — OFFICE VISIT (OUTPATIENT)
Dept: UROLOGY | Age: 72
End: 2022-08-10
Payer: MEDICARE

## 2022-08-10 DIAGNOSIS — N52.9 ERECTILE DYSFUNCTION, UNSPECIFIED ERECTILE DYSFUNCTION TYPE: ICD-10-CM

## 2022-08-10 DIAGNOSIS — C61 MALIGNANT NEOPLASM OF PROSTATE (HCC): Primary | ICD-10-CM

## 2022-08-10 LAB
BILIRUBIN, URINE, POC: NEGATIVE
BLOOD URINE, POC: NEGATIVE
GLUCOSE URINE, POC: NEGATIVE
KETONES, URINE, POC: NEGATIVE
LEUKOCYTE ESTERASE, URINE, POC: NEGATIVE
NITRITE, URINE, POC: NEGATIVE
PH, URINE, POC: 5.5 (ref 4.6–8)
PROTEIN,URINE, POC: NEGATIVE
SPECIFIC GRAVITY, URINE, POC: 1.03 (ref 1–1.03)
URINALYSIS CLARITY, POC: NORMAL
URINALYSIS COLOR, POC: NORMAL
UROBILINOGEN, POC: NORMAL

## 2022-08-10 PROCEDURE — 3017F COLORECTAL CA SCREEN DOC REV: CPT | Performed by: UROLOGY

## 2022-08-10 PROCEDURE — 81003 URINALYSIS AUTO W/O SCOPE: CPT | Performed by: UROLOGY

## 2022-08-10 PROCEDURE — 1123F ACP DISCUSS/DSCN MKR DOCD: CPT | Performed by: UROLOGY

## 2022-08-10 PROCEDURE — 99214 OFFICE O/P EST MOD 30 MIN: CPT | Performed by: UROLOGY

## 2022-08-10 PROCEDURE — G8421 BMI NOT CALCULATED: HCPCS | Performed by: UROLOGY

## 2022-08-10 PROCEDURE — 1036F TOBACCO NON-USER: CPT | Performed by: UROLOGY

## 2022-08-10 PROCEDURE — G8427 DOCREV CUR MEDS BY ELIG CLIN: HCPCS | Performed by: UROLOGY

## 2022-08-10 NOTE — PROGRESS NOTES
[facility]  [place], [address] Martín Collins. : 1950     HPI   81 y.o., male returns in follow up for CaP. S/P RALP on 20. Path showed Kan 3+4T2cN0 with positive apical and L posterior pos margins. He has done well post op. Reports ED has returned to baseline with Cialis or Viagra. Prefers Viagra. Interested in penile injections. ROGELIO has improved and he is down to one ppd. PSA remains und on 8/3/22. No new complaints.       Past Medical History:   Diagnosis Date    Allergic rhinitis     Bilateral impacted cerumen     BMI 30.0-30.9,adult     BMI 30.3    Cancer ([facility] [address]     prostate cancer    Claustrophobia     mild    Essential hypertension 2018    Family history of colon cancer     mother and sister    GERD (gastroesophageal reflux disease)     hx of nissen fundoplication and managed with diet    History of Gatica's esophagus     History of stomach ulcers     Hx of colonic polyps 2016    adenomas    Hypertension     controlled with medication    IBS (irritable bowel syndrome)     no current medications    Insomnia     Laryngopharyngeal reflux     Neck mass     Post-nasal drainage     Pure hypercholesterolemia 2018     Past Surgical History:   Procedure Laterality Date    CHOLECYSTECTOMY, OPEN      COLONOSCOPY  last 16    Shasha--asc TA, sigmoid TA, random bx negative--5 year recall    GI      nissen fundoplication surgery due to reflux    HERNIA REPAIR Right     right inguinal    MOHS SURGERY Right 2011    X2    OTHER SURGICAL HISTORY      prostate biopsy    OTHER SURGICAL HISTORY  2013    S/P Excision of right submandibular gland with FS    PROSTATECTOMY      ROTATOR CUFF REPAIR Right     TONSILLECTOMY      WISDOM TOOTH EXTRACTION       Current Outpatient Medications   Medication Sig Dispense Refill    diphenhydrAMINE (BENADRYL) 25 MG capsule Take 25 mg by mouth every 6 hours as needed melatonin 5 MG TABS tablet Take 10 mg by mouth      olmesartan (BENICAR) 20 MG tablet Take 20 mg by mouth daily      sildenafil (VIAGRA) 100 MG tablet Take 100 mg by mouth as needed      tadalafil (CIALIS) 20 MG tablet Take 20 mg by mouth as needed      zoster recombinant adjuvanted vaccine [facility]) 50 MCG/0.5ML SUSR injection 0.5mL by IntraMUSCular route once now and then repeat in 2-6 months       No current facility-administered medications for this visit. No Known Allergies  Social History     Socioeconomic History    Marital status:      Spouse name: Not on file    Number of children: Not on file    Years of education: Not on file    Highest education level: Not on file   Occupational History    Not on file   Tobacco Use    Smoking status: Former     Packs/day: 0.25     Types: Cigarettes     Quit date: 1978     Years since quittin.3    Smokeless tobacco: Former   Substance and Sexual Activity    Alcohol use: Yes    Drug use: No    Sexual activity: Not on file   Other Topics Concern    Not on file   Social History Narrative    Not on file     Social Determinants of Health     Financial Resource Strain: Not on file   Food Insecurity: Not on file   Transportation Needs: Not on file   Physical Activity: Not on file   Stress: Not on file   Social Connections: Not on file   Intimate Partner Violence: Not on file   Housing Stability: Not on file     Family History   Problem Relation Age of Onset    Malig Hypertherm Neg Hx     Osteoarthritis Mother     Delayed Awakening Neg Hx     Post-op Nausea/Vomiting Neg Hx     Emergence Delirium Neg Hx     Post-op Cognitive Dysfunction Neg Hx     Other Neg Hx     Pseudochol.  Deficiency Neg Hx     Heart Disease Mother     Cancer Mother         colon    Stroke Mother     No Known Problems Father         did not know father    Cancer Sister         colon    Heart Disease Sister     Cancer Sister         ovarian CA, melonoma       Review of Systems  All systems

## 2022-08-30 ENCOUNTER — TELEPHONE (OUTPATIENT)
Dept: FAMILY MEDICINE CLINIC | Facility: CLINIC | Age: 72
End: 2022-08-30

## 2022-08-30 NOTE — TELEPHONE ENCOUNTER
----- Message from Ana Cassidy sent at 8/30/2022  2:31 PM EDT -----  Subject: Referral Request    Reason for referral request? pt wants to have a referral placed to get a   colonoscopy done. please call pt to further discuss once referral has been   placed. Provider patient wants to be referred to(if known):     Provider Phone Number(if known):     Additional Information for Provider?   ---------------------------------------------------------------------------  --------------  [id number] [employer]    [phone]; OK to leave message on voicemail  ---------------------------------------------------------------------------  --------------

## 2022-09-19 ENCOUNTER — OFFICE VISIT (OUTPATIENT)
Dept: FAMILY MEDICINE CLINIC | Facility: CLINIC | Age: 72
End: 2022-09-19
Payer: MEDICARE

## 2022-09-19 VITALS
SYSTOLIC BLOOD PRESSURE: 118 MMHG | BODY MASS INDEX: 29.63 KG/M2 | HEART RATE: 72 BPM | WEIGHT: 207 LBS | OXYGEN SATURATION: 95 % | TEMPERATURE: 98 F | RESPIRATION RATE: 18 BRPM | DIASTOLIC BLOOD PRESSURE: 82 MMHG | HEIGHT: 70 IN

## 2022-09-19 DIAGNOSIS — I10 ESSENTIAL HYPERTENSION: Primary | ICD-10-CM

## 2022-09-19 DIAGNOSIS — K21.9 GASTROESOPHAGEAL REFLUX DISEASE, UNSPECIFIED WHETHER ESOPHAGITIS PRESENT: ICD-10-CM

## 2022-09-19 DIAGNOSIS — I73.9 PERIPHERAL VASCULAR DISEASE, UNSPECIFIED (HCC): ICD-10-CM

## 2022-09-19 DIAGNOSIS — C61 PROSTATE CANCER (HCC): ICD-10-CM

## 2022-09-19 DIAGNOSIS — Z13.31 DEPRESSION SCREENING NEGATIVE: ICD-10-CM

## 2022-09-19 DIAGNOSIS — R53.1 WEAKNESS: ICD-10-CM

## 2022-09-19 DIAGNOSIS — E78.00 PURE HYPERCHOLESTEROLEMIA: ICD-10-CM

## 2022-09-19 DIAGNOSIS — Z86.010 PERSONAL HISTORY OF COLONIC POLYPS: ICD-10-CM

## 2022-09-19 DIAGNOSIS — E66.3 OVERWEIGHT: ICD-10-CM

## 2022-09-19 DIAGNOSIS — R53.82 CHRONIC FATIGUE: ICD-10-CM

## 2022-09-19 PROCEDURE — G8417 CALC BMI ABV UP PARAM F/U: HCPCS | Performed by: FAMILY MEDICINE

## 2022-09-19 PROCEDURE — G8428 CUR MEDS NOT DOCUMENT: HCPCS | Performed by: FAMILY MEDICINE

## 2022-09-19 PROCEDURE — 99214 OFFICE O/P EST MOD 30 MIN: CPT | Performed by: FAMILY MEDICINE

## 2022-09-19 PROCEDURE — 1036F TOBACCO NON-USER: CPT | Performed by: FAMILY MEDICINE

## 2022-09-19 PROCEDURE — 1123F ACP DISCUSS/DSCN MKR DOCD: CPT | Performed by: FAMILY MEDICINE

## 2022-09-19 PROCEDURE — 3017F COLORECTAL CA SCREEN DOC REV: CPT | Performed by: FAMILY MEDICINE

## 2022-09-19 ASSESSMENT — ENCOUNTER SYMPTOMS
SHORTNESS OF BREATH: 0
NAUSEA: 0
DIARRHEA: 0
COUGH: 0
VOMITING: 0

## 2022-09-19 ASSESSMENT — PATIENT HEALTH QUESTIONNAIRE - PHQ9
2. FEELING DOWN, DEPRESSED OR HOPELESS: 0
SUM OF ALL RESPONSES TO PHQ QUESTIONS 1-9: 0
SUM OF ALL RESPONSES TO PHQ9 QUESTIONS 1 & 2: 0
SUM OF ALL RESPONSES TO PHQ QUESTIONS 1-9: 0
1. LITTLE INTEREST OR PLEASURE IN DOING THINGS: 0

## 2022-09-19 NOTE — PROGRESS NOTES
problem has been unchanged. Pertinent negatives include no chest pain, chills, coughing, fatigue, nausea or vomiting. Review of Systems   Constitutional:  Negative for chills and fatigue. Respiratory:  Negative for cough and shortness of breath. Cardiovascular:  Negative for chest pain and leg swelling. Gastrointestinal:  Negative for diarrhea, nausea and vomiting. Objective   Physical Exam  Vitals and nursing note reviewed. Constitutional:       General: He is not in acute distress. Appearance: Normal appearance. HENT:      Head: Normocephalic and atraumatic. Nose: Nose normal.      Mouth/Throat:      Pharynx: Oropharynx is clear. Eyes:      Extraocular Movements: Extraocular movements intact. Conjunctiva/sclera: Conjunctivae normal.   Cardiovascular:      Rate and Rhythm: Normal rate and regular rhythm. Pulses: Normal pulses. Heart sounds: Normal heart sounds. Pulmonary:      Effort: Pulmonary effort is normal.      Breath sounds: Normal breath sounds. Musculoskeletal:         General: No swelling or tenderness. Normal range of motion. Cervical back: Normal range of motion and neck supple. Skin:     General: Skin is warm and dry. Neurological:      General: No focal deficit present. Mental Status: He is alert. Mental status is at baseline. Psychiatric:         Mood and Affect: Mood normal.         Behavior: Behavior normal.              An electronic signature was used to authenticate this note.     --Jolynn Serra MD

## 2022-09-21 DIAGNOSIS — R53.82 CHRONIC FATIGUE: ICD-10-CM

## 2022-09-21 DIAGNOSIS — C61 PROSTATE CANCER (HCC): ICD-10-CM

## 2022-09-21 DIAGNOSIS — R53.1 WEAKNESS: ICD-10-CM

## 2022-09-21 DIAGNOSIS — I10 ESSENTIAL HYPERTENSION: ICD-10-CM

## 2022-09-21 DIAGNOSIS — K21.9 GASTROESOPHAGEAL REFLUX DISEASE, UNSPECIFIED WHETHER ESOPHAGITIS PRESENT: ICD-10-CM

## 2022-09-21 DIAGNOSIS — C61 MALIGNANT NEOPLASM OF PROSTATE (HCC): ICD-10-CM

## 2022-09-21 DIAGNOSIS — E78.00 PURE HYPERCHOLESTEROLEMIA: ICD-10-CM

## 2022-09-27 ENCOUNTER — CLINICAL DOCUMENTATION (OUTPATIENT)
Dept: SURGERY | Age: 72
End: 2022-09-27

## 2022-09-27 NOTE — PROGRESS NOTES
Referral received for routine colonoscopy- screening or surveillance only. Hx polyps, fam hx colon cancer (mother, sister)  Chart reviewed by me on September 27, 2022. Last exam 2/2/16-elena [w/REX-Ninfa].   Cecal TA, sigmoid TA    Pt found to be acceptable candidate for direct scheduling if they are interested with the following special instructions (if any):

## 2022-10-15 NOTE — PROGRESS NOTES
Care Management Interventions  Mode of Transport at Discharge: Self  Transition of Care Consult (CM Consult): Discharge Planning  Physical Therapy Consult: No  Occupational Therapy Consult: No  Speech Therapy Consult: No  Confirm Follow Up Transport: Self   Resource Information Provided?: No  Discharge Location  Discharge Placement: Home     Chart screened by  for discharge planning. Patient will likely return home at discharge. No needs identified at this time. CM will continue to follow patient during hospitalization for discharge planning and needs. Please consult  if any new issues arise. Pt has temp 101 8 and pulse 114 Respirations 22  Attending physician made aware   will continue to monitor

## 2022-11-03 ENCOUNTER — PREP FOR PROCEDURE (OUTPATIENT)
Dept: SURGERY | Age: 72
End: 2022-11-03

## 2022-11-03 ENCOUNTER — OFFICE VISIT (OUTPATIENT)
Dept: SURGERY | Age: 72
End: 2022-11-03
Payer: MEDICARE

## 2022-11-03 VITALS
BODY MASS INDEX: 29.76 KG/M2 | DIASTOLIC BLOOD PRESSURE: 81 MMHG | HEART RATE: 80 BPM | SYSTOLIC BLOOD PRESSURE: 134 MMHG | WEIGHT: 207.4 LBS

## 2022-11-03 DIAGNOSIS — Z86.010 HISTORY OF COLON POLYPS: ICD-10-CM

## 2022-11-03 DIAGNOSIS — K52.9 CHRONIC DIARRHEA: ICD-10-CM

## 2022-11-03 DIAGNOSIS — Z80.0 FAMILY HISTORY OF RECTAL CANCER: ICD-10-CM

## 2022-11-03 DIAGNOSIS — K43.2 INCISIONAL HERNIA, WITHOUT OBSTRUCTION OR GANGRENE: ICD-10-CM

## 2022-11-03 DIAGNOSIS — R19.5 ABNORMAL STOOL CALIBER: Primary | ICD-10-CM

## 2022-11-03 PROCEDURE — 99203 OFFICE O/P NEW LOW 30 MIN: CPT | Performed by: SURGERY

## 2022-11-03 PROCEDURE — 3079F DIAST BP 80-89 MM HG: CPT | Performed by: SURGERY

## 2022-11-03 PROCEDURE — G8427 DOCREV CUR MEDS BY ELIG CLIN: HCPCS | Performed by: SURGERY

## 2022-11-03 PROCEDURE — 3074F SYST BP LT 130 MM HG: CPT | Performed by: SURGERY

## 2022-11-03 PROCEDURE — 1036F TOBACCO NON-USER: CPT | Performed by: SURGERY

## 2022-11-03 PROCEDURE — 3017F COLORECTAL CA SCREEN DOC REV: CPT | Performed by: SURGERY

## 2022-11-03 PROCEDURE — 1123F ACP DISCUSS/DSCN MKR DOCD: CPT | Performed by: SURGERY

## 2022-11-03 PROCEDURE — G8417 CALC BMI ABV UP PARAM F/U: HCPCS | Performed by: SURGERY

## 2022-11-03 PROCEDURE — G8484 FLU IMMUNIZE NO ADMIN: HCPCS | Performed by: SURGERY

## 2022-11-03 ASSESSMENT — ENCOUNTER SYMPTOMS
EYE ITCHING: 0
BACK PAIN: 0
CONSTIPATION: 0
SORE THROAT: 0
EYE DISCHARGE: 0
EYE PAIN: 0
WHEEZING: 0
ABDOMINAL PAIN: 0
NAUSEA: 0
COUGH: 0
BLOOD IN STOOL: 0
COLOR CHANGE: 0
DIARRHEA: 1
ANAL BLEEDING: 0
VOMITING: 0
SINUS PAIN: 0
RECTAL PAIN: 0
ABDOMINAL DISTENTION: 0
SHORTNESS OF BREATH: 0

## 2022-11-03 NOTE — PROGRESS NOTES
Tim Ware MD, [facility], [address] [place] [address]  Jadon Dean  Phone [phone]   Fax [phone]      Date of visit: 11/7/2022     Primary/Requesting provider: Angelito Son MD    Chief Complaint   Patient presents with    New Patient     NP - [place] screening         Patient is a 81 y.o. male who presents for discussion of multiple concerns. He is due for colorectal cancer screening/polyp surveillance, with his prior exam done in 2016 by Dr Rafita Jackson revealing 2 tubular adenomas. Random biopsies done at that time were negative. He also c/o multiple hemorrhoids, sometimes uncomfortable and causing hygiene difficulty but he is not having bleeding. He attributes the hemorrhoids to his chronic diarrhea (which is likely what prompted random biopsies in 2016). He and his wife are not certain if diarrhea pre-dates his cholecystectomy, which was converted to open. He is not having fever, chills, nausea, vomiting or weight loss. He also has an enlaging incisional hernia which is beginning to protrude through clothing, but he does not have pain and the hernia is readily reducible. Medications:   Current Outpatient Medications on File Prior to Visit   Medication Sig Dispense Refill    diphenhydrAMINE (BENADRYL) 25 MG capsule Take 25 mg by mouth every 6 hours as needed      melatonin 5 MG TABS tablet Take 10 mg by mouth      olmesartan (BENICAR) 20 MG tablet Take 20 mg by mouth daily       No current facility-administered medications on file prior to visit.         Allergies: No Known Allergies     Past History:  Past Medical History:   Diagnosis Date    Allergic rhinitis     Bilateral impacted cerumen     BMI 30.0-30.9,adult     BMI 30.3    Cancer (HCC)     prostate cancer    Claustrophobia     mild    Essential hypertension 2/16/2018    Family history of colon cancer     mother and sister    GERD (gastroesophageal reflux disease)     hx of nissen fundoplication and managed with diet    History of Gatica's esophagus     History of stomach ulcers     Hx of colonic polyps 2016    adenomas    Hypertension     controlled with medication    IBS (irritable bowel syndrome)     no current medications    Insomnia     Laryngopharyngeal reflux     Neck mass     Post-nasal drainage     Pure hypercholesterolemia 2018      Past Surgical History:   Procedure Laterality Date    CHOLECYSTECTOMY, OPEN      COLONOSCOPY  last 16    Shasha--asc TA, sigmoid TA, random bx negative--5 year recall    GI      nissen fundoplication surgery due to reflux    HERNIA REPAIR Right     right inguinal    MOHS SURGERY Right 2011    X2    OTHER SURGICAL HISTORY      prostate biopsy    OTHER SURGICAL HISTORY  2013    S/P Excision of right submandibular gland with FS    PROSTATECTOMY      ROTATOR CUFF REPAIR Right     TONSILLECTOMY      WISDOM TOOTH EXTRACTION          Family and Social History:  Family History   Problem Relation Age of Onset    Osteoarthritis Mother     Heart Disease Mother     Cancer Mother         colon (rectal)    Stroke Mother     No Known Problems Father         did not know father    Cancer Sister         colon    Heart Disease Sister     Cancer Sister         ovarian CA, melonoma    Malig Hypertherm Neg Hx     Delayed Awakening Neg Hx     Post-op Nausea/Vomiting Neg Hx     Emergence Delirium Neg Hx     Post-op Cognitive Dysfunction Neg Hx     Other Neg Hx     Pseudochol.  Deficiency Neg Hx      Social History     Socioeconomic History    Marital status:      Spouse name: Not on file    Number of children: Not on file    Years of education: Not on file    Highest education level: Not on file   Occupational History    Not on file   Tobacco Use    Smoking status: Former     Packs/day: 0.25     Types: Cigarettes     Quit date: 1978     Years since quittin.6    Smokeless tobacco: Former   Substance and Sexual Activity    Alcohol use: Yes    Drug use: No    Sexual activity: Not on file   Other Topics Concern    Not on file   Social History Narrative    Not on file     Social Determinants of Health     Financial Resource Strain: Not on file   Food Insecurity: Not on file   Transportation Needs: Not on file   Physical Activity: Not on file   Stress: Not on file   Social Connections: Not on file   Intimate Partner Violence: Not on file   Housing Stability: Not on file           Review of Systems   Constitutional:  Positive for fatigue. Negative for chills and fever. HENT:  Negative for sinus pain, sneezing and sore throat. Eyes:  Negative for pain, discharge and itching. Respiratory:  Negative for cough, shortness of breath and wheezing. Cardiovascular:  Negative for chest pain and palpitations. Gastrointestinal:  Positive for diarrhea. Negative for abdominal distention, abdominal pain, anal bleeding, blood in stool, constipation, nausea, rectal pain and vomiting. Endocrine: Negative for cold intolerance, heat intolerance and polydipsia. Genitourinary:  Positive for frequency. Negative for difficulty urinating, dysuria and hematuria. Musculoskeletal:  Negative for back pain, gait problem and joint swelling. Skin:  Negative for color change, pallor and rash. Allergic/Immunologic: Negative for environmental allergies and food allergies. Neurological:  Negative for dizziness, light-headedness and headaches. Hematological:  Negative for adenopathy. Does not bruise/bleed easily. Psychiatric/Behavioral:  Positive for sleep disturbance. Negative for confusion. The patient is not nervous/anxious. Physical Exam  /81   Pulse 80   Wt 207 lb 6.4 oz (94.1 kg)   BMI 29.76 kg/m²   General Appearance: In no acute distress   Ears/Nose/Mouth/Throat:   Hearing grossly normal.         Neck: Supple. Chest:   Lungs clear to auscultation bilaterally. Cardiovascular:  Regular rate and rhythm, S1, S2 normal, no murmur. Abdomen:   Soft. Obese. Nontender. Supraumbilical midline reducible, fat-containing hernia. Rectal deferred. Extremities: No gross deformity    Neuro: alert and oriented x 3                  ASSESSMENT and PLAN:    1. Abnormal stool caliber    2. Family history of rectal cancer    3. History of colon polyps    4. Chronic diarrhea    5. Incisional hernia, without obstruction or gangrene       Incisional hernia is reducible and asymptomatic. Both ongoing observation and repair are appropriate. He is becoming self-conscious about it due to protrusion visible through clothing and thus he may be interested in discussing repair. Diarrhea has been chronic and he has previously had random bx which have ruled out microscopic colitis. He is due for colorectal cancer screening/polyp surveillance. Hemorrhoids can be investigated at that time; his description is strongly suggestive of external hemorrhoid and/or tags, which are not amenable to banding. He is agreeable to proceeding. Will proceed with Colonoscopy. Procedure and preparation reviewed. Risks reviewed including sedation risks, bleeding, perforation, incomplete exam, and missed pathology. Take prep the day prior to the procedure, as reviewed by the nurse. Call if any questions regarding the prep.

## 2022-11-04 RX ORDER — SOD SULF/POT CHLORIDE/MAG SULF 1.479 G
12 TABLET ORAL 2 TIMES DAILY
Qty: 24 TABLET | Refills: 0 | Status: SHIPPED | OUTPATIENT
Start: 2022-11-04

## 2022-12-02 NOTE — PERIOP NOTE
Patient verified name, , and procedure. Type: 1a; abbreviated assessment per anesthesia guidelines    Labs per anesthesia: none    Instructed pt that they will be notified the day before their procedure by the GI Lab for time of arrival if their procedure is [facility] and Pre-op for [place] cases. Arrival times should be called by 5 pm. If no phone is received the patient should contact their respective hospital. The GI lab telephone number is [phone] and ES Pre-op is [phone]. Follow diet and prep instructions per office including NPO status. If patient has NOT received instructions from office patient is advised to call surgeon office, verbalizes understanding. Bath or shower the night before and the am of surgery with non-mositurizing soap. No lotions, oils, powders, cologne on skin. No make up, eye make up or jewelry. Wear loose fitting comfortable, clean clothing. Must have adult present in building the entire time . Medications for the day of procedure- none, patient to hold- vitamins and supplements     The following discharge instructions reviewed with patient: medication given during procedure may cause drowsiness for several hours, therefore, do not drive or operate machinery for remainder of the day. You may not drink alcohol on the day of your procedure, please resume regular diet and activity unless otherwise directed. You may experience abdominal distention for several hours that is relieved by the passage of gas. Contact your physician if you have any of the following: fever or chills, severe abdominal pain or excessive amount of bleeding or a large amount when having a bowel movement.  Occasional specks of blood with bowel movement would not be unusual.

## 2022-12-07 ENCOUNTER — ANESTHESIA (OUTPATIENT)
Dept: ENDOSCOPY | Age: 72
End: 2022-12-07
Payer: MEDICARE

## 2022-12-07 ENCOUNTER — HOSPITAL ENCOUNTER (OUTPATIENT)
Age: 72
Setting detail: OUTPATIENT SURGERY
Discharge: HOME | End: 2022-12-07
Attending: SURGERY | Admitting: SURGERY
Payer: MEDICARE

## 2022-12-07 ENCOUNTER — ANESTHESIA EVENT (OUTPATIENT)
Dept: ENDOSCOPY | Age: 72
End: 2022-12-07
Payer: MEDICARE

## 2022-12-07 VITALS
DIASTOLIC BLOOD PRESSURE: 83 MMHG | WEIGHT: 203.2 LBS | OXYGEN SATURATION: 98 % | BODY MASS INDEX: 29.09 KG/M2 | TEMPERATURE: 97.3 F | HEART RATE: 54 BPM | HEIGHT: 70 IN | SYSTOLIC BLOOD PRESSURE: 125 MMHG | RESPIRATION RATE: 16 BRPM

## 2022-12-07 DIAGNOSIS — Z80.0 FAMILY HISTORY OF COLON CANCER: ICD-10-CM

## 2022-12-07 PROBLEM — D12.3 BENIGN NEOPLASM OF TRANSVERSE COLON: Status: ACTIVE | Noted: 2022-12-07

## 2022-12-07 PROCEDURE — 7100000011 HC PHASE II RECOVERY - ADDTL 15 MIN: Performed by: SURGERY

## 2022-12-07 PROCEDURE — 45384 COLONOSCOPY W/LESION REMOVAL: CPT | Performed by: SURGERY

## 2022-12-07 PROCEDURE — 3700000001 HC ADD 15 MINUTES (ANESTHESIA): Performed by: SURGERY

## 2022-12-07 PROCEDURE — 3700000000 HC ANESTHESIA ATTENDED CARE: Performed by: SURGERY

## 2022-12-07 PROCEDURE — 2709999900 HC NON-CHARGEABLE SUPPLY: Performed by: SURGERY

## 2022-12-07 PROCEDURE — 88305 TISSUE EXAM BY PATHOLOGIST: CPT

## 2022-12-07 PROCEDURE — 3609010400 HC COLONOSCOPY POLYPECTOMY HOT BIOPSY: Performed by: SURGERY

## 2022-12-07 PROCEDURE — 7100000010 HC PHASE II RECOVERY - FIRST 15 MIN: Performed by: SURGERY

## 2022-12-07 PROCEDURE — 6360000002 HC RX W HCPCS

## 2022-12-07 PROCEDURE — 2580000003 HC RX 258: Performed by: ANESTHESIOLOGY

## 2022-12-07 RX ORDER — LIDOCAINE HYDROCHLORIDE 10 MG/ML
1 INJECTION, SOLUTION INFILTRATION; PERINEURAL
Status: DISCONTINUED | OUTPATIENT
Start: 2022-12-07 | End: 2022-12-07 | Stop reason: HOSPADM

## 2022-12-07 RX ORDER — PROPOFOL 10 MG/ML
INJECTION, EMULSION INTRAVENOUS CONTINUOUS PRN
Status: DISCONTINUED | OUTPATIENT
Start: 2022-12-07 | End: 2022-12-07 | Stop reason: SDUPTHER

## 2022-12-07 RX ORDER — PROPOFOL 10 MG/ML
INJECTION, EMULSION INTRAVENOUS PRN
Status: DISCONTINUED | OUTPATIENT
Start: 2022-12-07 | End: 2022-12-07 | Stop reason: SDUPTHER

## 2022-12-07 RX ORDER — SODIUM CHLORIDE, SODIUM LACTATE, POTASSIUM CHLORIDE, CALCIUM CHLORIDE 600; 310; 30; 20 MG/100ML; MG/100ML; MG/100ML; MG/100ML
INJECTION, SOLUTION INTRAVENOUS CONTINUOUS
Status: DISCONTINUED | OUTPATIENT
Start: 2022-12-07 | End: 2022-12-07 | Stop reason: HOSPADM

## 2022-12-07 RX ADMIN — SODIUM CHLORIDE, SODIUM LACTATE, POTASSIUM CHLORIDE, AND CALCIUM CHLORIDE: 600; 310; 30; 20 INJECTION, SOLUTION INTRAVENOUS at 10:02

## 2022-12-07 RX ADMIN — PROPOFOL 70 MG: 10 INJECTION, EMULSION INTRAVENOUS at 10:17

## 2022-12-07 RX ADMIN — PROPOFOL 180 MCG/KG/MIN: 10 INJECTION, EMULSION INTRAVENOUS at 10:19

## 2022-12-07 ASSESSMENT — PAIN - FUNCTIONAL ASSESSMENT: PAIN_FUNCTIONAL_ASSESSMENT: 0-10

## 2022-12-07 NOTE — ANESTHESIA PRE PROCEDURE
Department of Anesthesiology  Preprocedure Note       Name:  Dameon Knowles. Age:  81 y.o.  :  1950                                          MRN:  [id number]         Date:  2022      Surgeon: Jose Cruz Momin):  Paloma Justice MD    Procedure: Procedure(s):  COLORECTAL CANCER SCREENING, NOT HIGH RISK    Medications prior to admission:   Prior to Admission medications    Medication Sig Start Date End Date Taking? Authorizing Provider   Sodium Sulfate-Mag Sulfate-KCl (SUTAB) 2590-104-848 MG TABS Take 12 tablets by mouth in the morning and at bedtime Follow the directions given by the office only.  22   Paloma Justice MD   melatonin 5 MG TABS tablet Take 10 mg by mouth nightly as needed    Ar Automatic Reconciliation   olmesartan (BENICAR) 20 MG tablet Take 20 mg by mouth daily 22   Ar Automatic Reconciliation       Current medications:    Current Facility-Administered Medications   Medication Dose Route Frequency Provider Last Rate Last Admin    lidocaine 1 % injection 1 mL  1 mL IntraDERmal Once PRN José Miguel Cruz MD        lactated ringers infusion   IntraVENous Continuous José Miguel Cruz MD           Allergies:  No Known Allergies    Problem List:    Patient Active Problem List   Diagnosis Code    Essential hypertension I10    Elevated PSA R97.20    GERD (gastroesophageal reflux disease) K21.9    Personal history of colonic polyps Z86.010    Other specified postprocedural states Z98.890    BMI 29.0-29.9,adult Z68.29    Pure hypercholesterolemia E78.00    Laryngopharyngeal reflux K21.9    Neck mass R22.1    Prostate cancer ([facility] [address] C61    Erectile dysfunction due to arterial insufficiency N52.01    History of Gatica's esophagus Z87.19    Peripheral vascular disease, unspecified ([facility] [address] I73.9    Family history of colon cancer Z80.0       Past Medical History:        Diagnosis Date    Allergic rhinitis     Bilateral impacted cerumen     BMI 30.0-30.9,adult     BMI 30.3    Cancer (HCC)     prostate cancer- surgery    Claustrophobia     mild    Family history of colon cancer     mother and sister    GERD (gastroesophageal reflux disease)     hx of nissen fundoplication and managed with diet    History of Gatica's esophagus     History of stomach ulcers     Hx of colonic polyps 2016    adenomas    Hypertension     controlled with medication    IBS (irritable bowel syndrome)     no current medications    Insomnia     Laryngopharyngeal reflux     Neck mass     Post-nasal drainage     Pure hypercholesterolemia 2018    Urinary incontinence        Past Surgical History:        Procedure Laterality Date    CHOLECYSTECTOMY, OPEN      COLONOSCOPY  last 16    Shasha--asc TA, sigmoid TA, random bx negative--5 year recall    GI      nissen fundoplication surgery due to reflux    HERNIA REPAIR Right     right inguinal    MOHS SURGERY Right 2011    X2    OTHER SURGICAL HISTORY      prostate biopsy    OTHER SURGICAL HISTORY  2013    S/P Excision of right submandibular gland with FS    PROSTATECTOMY  2019    ROTATOR CUFF REPAIR Right 2008    TONSILLECTOMY      WISDOM TOOTH EXTRACTION         Social History:    Social History     Tobacco Use    Smoking status: Former     Packs/day: 0.25     Years: 2.00     Pack years: 0.50     Types: Cigarettes     Quit date: 1978     Years since quittin.7    Smokeless tobacco: Former   Substance Use Topics    Alcohol use: Yes     Comment: occas                                Counseling given: Not Answered      Vital Signs (Current):   Vitals:    22 1420 22 0914   BP:  139/89   Pulse:  78   Resp:  18   Temp:  97.3 °F (36.3 °C)   TempSrc:  Temporal   SpO2:  96%   Weight: 205 lb (93 kg) 203 lb 3.2 oz (92.2 kg)   Height: 5' 10\" (1.778 m) 5' 10\" (1.778 m)                                              BP Readings from Last 3 Encounters:   22 139/89   22 134/81   22 118/82 NPO Status: Time of last liquid consumption: 2200                        Time of last solid consumption: 1900                        Date of last liquid consumption: 12/06/22                        Date of last solid food consumption: 12/05/22    BMI:   Wt Readings from Last 3 Encounters:   12/07/22 203 lb 3.2 oz (92.2 kg)   11/03/22 207 lb 6.4 oz (94.1 kg)   09/19/22 207 lb (93.9 kg)     Body mass index is 29.16 kg/m². CBC:   Lab Results   Component Value Date/Time    WBC 5.1 08/19/2020 11:23 AM    RBC 4.35 08/19/2020 11:23 AM    HGB 12.5 08/28/2020 06:11 AM    HCT 38.9 08/28/2020 06:11 AM    MCV 98.2 08/19/2020 11:23 AM    RDW 13.6 08/19/2020 11:23 AM     08/19/2020 11:23 AM       CMP:   Lab Results   Component Value Date/Time     09/29/2020 10:40 AM    K 4.8 09/29/2020 10:40 AM     09/29/2020 10:40 AM    CO2 25 09/29/2020 10:40 AM    BUN 25 09/29/2020 10:40 AM    CREATININE 1.59 09/29/2020 10:40 AM    GFRAA 51 09/29/2020 10:40 AM    AGRATIO 1.8 09/29/2020 10:40 AM    GLUCOSE 87 09/29/2020 10:40 AM    PROT 7.0 09/29/2020 10:40 AM    CALCIUM 9.7 09/29/2020 10:40 AM    BILITOT 1.1 09/29/2020 10:40 AM    ALKPHOS 68 09/29/2020 10:40 AM    AST 20 09/29/2020 10:40 AM    ALT 35 09/29/2020 10:40 AM       POC Tests: No results for input(s): POCGLU, POCNA, POCK, POCCL, POCBUN, POCHEMO, POCHCT in the last 72 hours.     Coags:   Lab Results   Component Value Date/Time    PROTIME 12.9 08/19/2020 11:23 AM    INR 1.0 08/19/2020 11:23 AM       HCG (If Applicable): No results found for: PREGTESTUR, PREGSERUM, HCG, HCGQUANT     ABGs: No results found for: PHART, PO2ART, MEA9BFD, HZW9AEO, BEART, R9UDGOKC     Type & Screen (If Applicable):  No results found for: LABABO, LABRH    Drug/Infectious Status (If Applicable):  No results found for: HIV, HEPCAB    COVID-19 Screening (If Applicable):   Lab Results   Component Value Date/Time    COVID19 Detected 02/02/2021 12:00 AM           Anesthesia Evaluation    Airway: Mallampati: II  TM distance: >3 FB   Neck ROM: full  Mouth opening: > = 3 FB   Dental: normal exam         Pulmonary:Negative Pulmonary ROS and normal exam  breath sounds clear to auscultation                             Cardiovascular:Negative CV ROS  Exercise tolerance: good (>4 METS),   (+) hypertension:,         Rhythm: regular  Rate: normal                    Neuro/Psych:   Negative Neuro/Psych ROS              GI/Hepatic/Renal: Neg GI/Hepatic/Renal ROS  (+) GERD (s/p Nissen):,           Endo/Other: Negative Endo/Other ROS   (+) malignancy/cancer (Prostate). Abdominal:             Vascular: negative vascular ROS. Other Findings:           Anesthesia Plan      TIVA     ASA 2       Induction: intravenous. Anesthetic plan and risks discussed with patient and spouse.                         Duncan Banegas MD   12/7/2022

## 2022-12-07 NOTE — DISCHARGE INSTRUCTIONS
Valarie Lobato M.D.  [phone]    Instructions following colonoscopy:    ACTIVITY:  Resume usual, basic activities around the house today. You may be light-headed or sleepy from anesthesia, so be careful going up and down stairs. Avoid driving, operating machinery, or signing documents for 24 hours. DIET:  No restriction. Please note, some people may have nausea or cramps after this procedure which can result in an upset stomach after eating. Many people have loose stools or diarrhea immediately after colonoscopy. It is also not uncommon to not have a bowel movement for 2-3 days. No Alcohol for the rest of the day. PAIN:  Some cramping or gas pain is normal after colonoscopy. However, if you experience worsening pain over the course of the day, or pain with associated fever please call the office immediately      [address] IF:  You have a temperature higher than 101.5° Fahrenheit for more than 6 hours. You have severe nausea or vomiting not relieved by medication; or diarrhea. Otherwise, continue home medications as previously prescribed. MEDICATION INTERACTION:    During your procedure you potentially received a medication or medications which may reduce the effectiveness of oral contraceptives. Please consider other forms of contraception for 1 month following your procedure if you are currently using oral contraceptives as your primary form of birth control. In addition to this, we recommend continuing your oral contraceptive as prescribed, unless otherwise instructed by your physician, during this time.     After general anesthesia or intravenous sedation, for 24 hours or while taking prescription Narcotics:  Limit your activities  A responsible adult needs to be with you for the next 24 hours  Do not drive and operate hazardous machinery  Do not make important personal or business decisions  Do not drink alcoholic beverages  If you have not urinated within 8 hours after discharge, and you are experiencing discomfort from urinary retention, please go to the nearest ED. If you have sleep apnea and have a CPAP machine, please use it for all naps and sleeping. Please use caution when taking narcotics and any of your home medications that may cause drowsiness. *  Please give a list of your current medications to your Primary Care Provider. *  Please update this list whenever your medications are discontinued, doses are      changed, or new medications (including over-the-counter products) are added. *  Please carry medication information at all times in case of emergency situations. These are general instructions for a healthy lifestyle:  No smoking/ No tobacco products/ Avoid exposure to second hand smoke  Surgeon General's Warning:  Quitting smoking now greatly reduces serious risk to your health. Obesity, smoking, and sedentary lifestyle greatly increases your risk for illness  A healthy diet, regular physical exercise & weight monitoring are important for maintaining a healthy lifestyle    You may be retaining fluid if you have a history of heart failure or if you experience any of the following symptoms:  Weight gain of 3 pounds or more overnight or 5 pounds in a week, increased swelling in our hands or feet or shortness of breath while lying flat in bed. Please call your doctor as soon as you notice any of these symptoms; do not wait until your next office visit.

## 2022-12-07 NOTE — OP NOTE
Operative Report    Patient: Dominic Lopez MRN: [id number]      YOB: 1950  Age: 81 y.o. Sex: male            Preoperative Diagnosis: History of polyps     Postoperative Diagnosis: Polyp x1    Procedure:  [id number]-[id number] with removal lesion, hot forceps    Anesthesia: MAREN-per anesthesia    Indications:  As outlined in the History and Physical.      Procedure in Detail:  Informed consent was obtained for the procedure. The patient was placed in the left lateral decubitus position and sedation was induced by anesthesia. The scope was inserted into the rectum and advanced under direct vision to the cecum, which was identified by the appendiceal orifice. The quality of the colonic preparation was excellent. A careful inspection was made as the colonoscope was withdrawn, including a retroflexed view of the rectum; findings and interventions are described below. Appropriate photodocumentation was obtained. Findings:     Anus:  Normal  Rectum:   Minimal internal hemorrhoids   Sigmoid:   Normal  Descending Colon:   Normal  Transverse Colon:       -Protruding lesions:     -Sessile Polyp(s) size 3 mm removed by polypectomy (hot biopsy)  Ascending Colon:   Normal  Cecum:   Normal  Terminal Ileum: Not entered    Specimens: Specimens were collected and sent to pathology. Complications: None; patient tolerated the procedure well. \    EBL -insignificant    Recommendations:   - Await pathology.       Signed By:  Marge Rosales MD     December 7, 2022

## 2022-12-07 NOTE — ANESTHESIA POSTPROCEDURE EVALUATION
Department of Anesthesiology  Postprocedure Note    Patient: Eron Garcia MRN: [id number]  YOB: 1950  Date of evaluation: 12/7/2022      Procedure Summary     Date: 12/07/22 Room / Location: [facility] ENDO 01 / [facility] ENDOSCOPY    Anesthesia Start: 1013 Anesthesia Stop: 1042    Procedure: COLONOSCOPY POLYPECTOMY HOT BIOPSY Diagnosis:       Family history of colon cancer      Hx of colonic polyps      (Family history of colon cancer [Z80.0])      (Hx of colonic polyps [Z86.010])    Providers: Jeromy Joaquin MD Responsible Provider: Da Dias MD    Anesthesia Type: TIVA ASA Status: 2          Anesthesia Type: No value filed.     Stacey Phase I:      Stacey Phase II: Stacey Score: 8      Anesthesia Post Evaluation    Patient location during evaluation: PACU  Patient participation: complete - patient participated  Level of consciousness: awake and alert  Airway patency: patent  Nausea & Vomiting: no nausea and no vomiting  Complications: no  Cardiovascular status: hemodynamically stable  Respiratory status: acceptable, nonlabored ventilation and spontaneous ventilation  Hydration status: euvolemic  Comments: /82   Pulse 60   Temp 97.3 °F (36.3 °C) (Temporal)   Resp 16   Ht 5' 10\" (1.778 m)   Wt 203 lb 3.2 oz (92.2 kg)   SpO2 97%   BMI 29.16 kg/m²     Multimodal analgesia pain management approach

## 2022-12-07 NOTE — H&P
Primary/Requesting provider: Mary Yi MD          Chief Complaint   Patient presents with    New Patient       NP - [place] screening          Patient is a 81 y.o. male who presents for discussion of multiple concerns. He is due for colorectal cancer screening/polyp surveillance, with his prior exam done in 2016 by Dr Kishan Hope revealing 2 tubular adenomas. Random biopsies done at that time were negative. He also c/o multiple hemorrhoids, sometimes uncomfortable and causing hygiene difficulty but he is not having bleeding. He attributes the hemorrhoids to his chronic diarrhea (which is likely what prompted random biopsies in 2016). He and his wife are not certain if diarrhea pre-dates his cholecystectomy, which was converted to open. He is not having fever, chills, nausea, vomiting or weight loss. He also has an enlaging incisional hernia which is beginning to protrude through clothing, but he does not have pain and the hernia is readily reducible. Medications:          Current Outpatient Medications on File Prior to Visit   Medication Sig Dispense Refill    diphenhydrAMINE (BENADRYL) 25 MG capsule Take 25 mg by mouth every 6 hours as needed        melatonin 5 MG TABS tablet Take 10 mg by mouth        olmesartan (BENICAR) 20 MG tablet Take 20 mg by mouth daily          No current facility-administered medications on file prior to visit.          Allergies: No Known Allergies      Past History:       Past Medical History:   Diagnosis Date    Allergic rhinitis      Bilateral impacted cerumen      BMI 30.0-30.9,adult       BMI 30.3    Cancer (HCC)       prostate cancer    Claustrophobia       mild    Essential hypertension 2/16/2018    Family history of colon cancer       mother and sister    GERD (gastroesophageal reflux disease)       hx of nissen fundoplication and managed with diet    History of Gatica's esophagus      History of stomach ulcers      Hx of colonic polyps 2016     adenomas Hypertension       controlled with medication    IBS (irritable bowel syndrome)       no current medications    Insomnia      Laryngopharyngeal reflux      Neck mass      Post-nasal drainage      Pure hypercholesterolemia 2018            Past Surgical History:   Procedure Laterality Date    CHOLECYSTECTOMY, OPEN       COLONOSCOPY   last 16     Shasha--asc TA, sigmoid TA, random bx negative--5 year recall    GI        nissen fundoplication surgery due to reflux    HERNIA REPAIR Right       right inguinal    MOHS SURGERY Right 2011     X2    OTHER SURGICAL HISTORY         prostate biopsy    OTHER SURGICAL HISTORY   2013     S/P Excision of right submandibular gland with FS    PROSTATECTOMY        ROTATOR CUFF REPAIR Right     TONSILLECTOMY        WISDOM TOOTH EXTRACTION             Family and Social History:        Family History   Problem Relation Age of Onset    Osteoarthritis Mother      Heart Disease Mother      Cancer Mother           colon (rectal)    Stroke Mother      No Known Problems Father           did not know father    Cancer Sister           colon    Heart Disease Sister      Cancer Sister           ovarian CA, melonoma    Malig Hypertherm Neg Hx      Delayed Awakening Neg Hx      Post-op Nausea/Vomiting Neg Hx      Emergence Delirium Neg Hx      Post-op Cognitive Dysfunction Neg Hx      Other Neg Hx      Pseudochol.  Deficiency Neg Hx        Social History            Socioeconomic History    Marital status:        Spouse name: Not on file    Number of children: Not on file    Years of education: Not on file    Highest education level: Not on file   Occupational History    Not on file   Tobacco Use    Smoking status: Former       Packs/day: 0.25       Types: Cigarettes       Quit date: 1978       Years since quittin.6    Smokeless tobacco: Former   Substance and Sexual Activity    Alcohol use: Yes    Drug use: No    Sexual activity: Not on file   Other Topics Concern    Not on file   Social History Narrative    Not on file      Social Determinants of Health      Financial Resource Strain: Not on file   Food Insecurity: Not on file   Transportation Needs: Not on file   Physical Activity: Not on file   Stress: Not on file   Social Connections: Not on file   Intimate Partner Violence: Not on file   Housing Stability: Not on file            Review of Systems   Constitutional:  Positive for fatigue. Negative for chills and fever. HENT:  Negative for sinus pain, sneezing and sore throat. Eyes:  Negative for pain, discharge and itching. Respiratory:  Negative for cough, shortness of breath and wheezing. Cardiovascular:  Negative for chest pain and palpitations. Gastrointestinal:  Positive for diarrhea. Negative for abdominal distention, abdominal pain, anal bleeding, blood in stool, constipation, nausea, rectal pain and vomiting. Endocrine: Negative for cold intolerance, heat intolerance and polydipsia. Genitourinary:  Positive for frequency. Negative for difficulty urinating, dysuria and hematuria. Musculoskeletal:  Negative for back pain, gait problem and joint swelling. Skin:  Negative for color change, pallor and rash. Allergic/Immunologic: Negative for environmental allergies and food allergies. Neurological:  Negative for dizziness, light-headedness and headaches. Hematological:  Negative for adenopathy. Does not bruise/bleed easily. Psychiatric/Behavioral:  Positive for sleep disturbance. Negative for confusion. The patient is not nervous/anxious. Physical Exam  /81   Pulse 80   Wt 207 lb 6.4 oz (94.1 kg)   BMI 29.76 kg/m²   General Appearance: In no acute distress   Ears/Nose/Mouth/Throat:   Hearing grossly normal.          Neck: Supple. Chest:   Lungs clear to auscultation bilaterally. Cardiovascular:  Regular rate and rhythm, S1, S2 normal, no murmur. Abdomen:   Soft. Obese. Nontender.   Supraumbilical midline reducible, fat-containing hernia. Rectal deferred. Extremities: No gross deformity    Neuro: alert and oriented x 3                        ASSESSMENT and PLAN:     1. Abnormal stool caliber    2. Family history of rectal cancer    3. History of colon polyps    4. Chronic diarrhea    5. Incisional hernia, without obstruction or gangrene       Incisional hernia is reducible and asymptomatic. Both ongoing observation and repair are appropriate. He is becoming self-conscious about it due to protrusion visible through clothing and thus he may be interested in discussing repair. Diarrhea has been chronic and he has previously had random bx which have ruled out microscopic colitis. He is due for colorectal cancer screening/polyp surveillance. Hemorrhoids can be investigated at that time; his description is strongly suggestive of external hemorrhoid and/or tags, which are not amenable to banding. He is agreeable to proceeding. Will proceed with Colonoscopy. Procedure and preparation reviewed. Risks reviewed including sedation risks, bleeding, perforation, incomplete exam, and missed pathology. Take prep the day prior to the procedure, as reviewed by the nurse. Call if any questions regarding the prep.

## 2023-01-23 ENCOUNTER — OFFICE VISIT (OUTPATIENT)
Dept: FAMILY MEDICINE CLINIC | Facility: CLINIC | Age: 73
End: 2023-01-23
Payer: MEDICARE

## 2023-01-23 VITALS
WEIGHT: 203 LBS | HEART RATE: 61 BPM | HEIGHT: 70 IN | RESPIRATION RATE: 18 BRPM | TEMPERATURE: 98 F | OXYGEN SATURATION: 100 % | DIASTOLIC BLOOD PRESSURE: 70 MMHG | BODY MASS INDEX: 29.06 KG/M2 | SYSTOLIC BLOOD PRESSURE: 112 MMHG

## 2023-01-23 DIAGNOSIS — C61 PROSTATE CANCER (HCC): ICD-10-CM

## 2023-01-23 DIAGNOSIS — K21.9 GASTROESOPHAGEAL REFLUX DISEASE, UNSPECIFIED WHETHER ESOPHAGITIS PRESENT: ICD-10-CM

## 2023-01-23 DIAGNOSIS — I10 ESSENTIAL HYPERTENSION: Primary | ICD-10-CM

## 2023-01-23 DIAGNOSIS — E78.00 PURE HYPERCHOLESTEROLEMIA: ICD-10-CM

## 2023-01-23 DIAGNOSIS — I73.9 PERIPHERAL VASCULAR DISEASE, UNSPECIFIED (HCC): ICD-10-CM

## 2023-01-23 PROCEDURE — 99214 OFFICE O/P EST MOD 30 MIN: CPT | Performed by: FAMILY MEDICINE

## 2023-01-23 PROCEDURE — 1123F ACP DISCUSS/DSCN MKR DOCD: CPT | Performed by: FAMILY MEDICINE

## 2023-01-23 PROCEDURE — G8427 DOCREV CUR MEDS BY ELIG CLIN: HCPCS | Performed by: FAMILY MEDICINE

## 2023-01-23 PROCEDURE — G8417 CALC BMI ABV UP PARAM F/U: HCPCS | Performed by: FAMILY MEDICINE

## 2023-01-23 PROCEDURE — 3078F DIAST BP <80 MM HG: CPT | Performed by: FAMILY MEDICINE

## 2023-01-23 PROCEDURE — 3074F SYST BP LT 130 MM HG: CPT | Performed by: FAMILY MEDICINE

## 2023-01-23 PROCEDURE — 3017F COLORECTAL CA SCREEN DOC REV: CPT | Performed by: FAMILY MEDICINE

## 2023-01-23 PROCEDURE — G8484 FLU IMMUNIZE NO ADMIN: HCPCS | Performed by: FAMILY MEDICINE

## 2023-01-23 PROCEDURE — 1036F TOBACCO NON-USER: CPT | Performed by: FAMILY MEDICINE

## 2023-01-23 ASSESSMENT — PATIENT HEALTH QUESTIONNAIRE - PHQ9
2. FEELING DOWN, DEPRESSED OR HOPELESS: 0
1. LITTLE INTEREST OR PLEASURE IN DOING THINGS: 0
SUM OF ALL RESPONSES TO PHQ9 QUESTIONS 1 & 2: 0
SUM OF ALL RESPONSES TO PHQ QUESTIONS 1-9: 0

## 2023-01-23 ASSESSMENT — ENCOUNTER SYMPTOMS
VOMITING: 0
DIARRHEA: 0
NAUSEA: 0
COUGH: 0
SHORTNESS OF BREATH: 0

## 2023-01-23 NOTE — PROGRESS NOTES
Elijah Sacnhez (:  1950) is a 81 y.o. male,Established patient, here for evaluation of the following chief complaint(s):  Follow-up (Chronic care follow up. Fasting. ), Hypertension (Well-controlled), and Cholesterol Problem (Repeat fasting labs)         ASSESSMENT/PLAN:  1. Essential hypertension- well-controlled  -     CBC with Auto Differential; Future  -     Comprehensive Metabolic Panel; Future  2. Pure hypercholesterolemia- repeat fasting labs  -     Comprehensive Metabolic Panel; Future  -     Lipid Panel; Future  3. Peripheral vascular disease, unspecified ([address]     - stable  4. Prostate cancer [facility])  Assessment & Plan:   Well-controlled, continue current medications  5. Gastroesophageal reflux disease, unspecified whether esophagitis present  Assessment & Plan:   Borderline controlled, continue current medications  6. BMI 29.0-29.9,adult  Assessment & Plan:   Uncontrolled, lifestyle modifications recommended  BMI handout      Return in about 3 months (around 2023) for fasting chronic care. Subjective   SUBJECTIVE/OBJECTIVE:  Hypertension  This is a chronic problem. The current episode started more than 1 year ago. The problem is unchanged. The problem is controlled. Pertinent negatives include no chest pain or shortness of breath. Hyperlipidemia  This is a chronic problem. The current episode started more than 1 year ago. The problem is resistant. Recent lipid tests were reviewed and are variable. Pertinent negatives include no chest pain or shortness of breath. Review of Systems   Constitutional:  Negative for chills and fatigue. Respiratory:  Negative for cough and shortness of breath. Cardiovascular:  Negative for chest pain and leg swelling. Gastrointestinal:  Negative for diarrhea, nausea and vomiting. Objective   Physical Exam  Vitals and nursing note reviewed. Constitutional:       General: He is not in acute distress.      Appearance: Normal appearance. HENT:      Head: Normocephalic and atraumatic. Nose: Nose normal.      Mouth/Throat:      Pharynx: Oropharynx is clear. Eyes:      Extraocular Movements: Extraocular movements intact. Conjunctiva/sclera: Conjunctivae normal.   Cardiovascular:      Rate and Rhythm: Normal rate and regular rhythm. Pulses: Normal pulses. Heart sounds: Normal heart sounds. Pulmonary:      Effort: Pulmonary effort is normal.      Breath sounds: Normal breath sounds. Musculoskeletal:         General: No swelling or tenderness. Normal range of motion. Cervical back: Normal range of motion and neck supple. Skin:     General: Skin is warm and dry. Neurological:      General: No focal deficit present. Mental Status: He is alert. Mental status is at baseline. Psychiatric:         Mood and Affect: Mood normal.         Behavior: Behavior normal.              An electronic signature was used to authenticate this note.     --Maude George MD

## 2023-01-27 ENCOUNTER — OFFICE VISIT (OUTPATIENT)
Dept: FAMILY MEDICINE CLINIC | Facility: CLINIC | Age: 73
End: 2023-01-27
Payer: MEDICARE

## 2023-01-27 VITALS
OXYGEN SATURATION: 95 % | DIASTOLIC BLOOD PRESSURE: 60 MMHG | SYSTOLIC BLOOD PRESSURE: 106 MMHG | WEIGHT: 205.6 LBS | BODY MASS INDEX: 29.43 KG/M2 | HEIGHT: 70 IN | TEMPERATURE: 98 F | HEART RATE: 61 BPM

## 2023-01-27 DIAGNOSIS — Z00.00 MEDICARE ANNUAL WELLNESS VISIT, SUBSEQUENT: Primary | ICD-10-CM

## 2023-01-27 DIAGNOSIS — E78.00 PURE HYPERCHOLESTEROLEMIA: ICD-10-CM

## 2023-01-27 DIAGNOSIS — I10 ESSENTIAL HYPERTENSION: ICD-10-CM

## 2023-01-27 LAB
BASOPHILS # BLD: 0.1 K/UL (ref 0–0.2)
BASOPHILS NFR BLD: 1 % (ref 0–2)
DIFFERENTIAL METHOD BLD: NORMAL
EOSINOPHIL # BLD: 0.2 K/UL (ref 0–0.8)
EOSINOPHIL NFR BLD: 4 % (ref 0.5–7.8)
ERYTHROCYTE [DISTWIDTH] IN BLOOD BY AUTOMATED COUNT: 14.3 % (ref 11.9–14.6)
HCT VFR BLD AUTO: 43.7 % (ref 41.1–50.3)
HGB BLD-MCNC: 14.1 G/DL (ref 13.6–17.2)
IMM GRANULOCYTES # BLD AUTO: 0.3 K/UL (ref 0–0.5)
IMM GRANULOCYTES NFR BLD AUTO: 5 % (ref 0–5)
LYMPHOCYTES # BLD: 1 K/UL (ref 0.5–4.6)
LYMPHOCYTES NFR BLD: 16 % (ref 13–44)
MCH RBC QN AUTO: 32 PG (ref 26.1–32.9)
MCHC RBC AUTO-ENTMCNC: 32.3 G/DL (ref 31.4–35)
MCV RBC AUTO: 99.3 FL (ref 82–102)
MONOCYTES # BLD: 0.6 K/UL (ref 0.1–1.3)
MONOCYTES NFR BLD: 10 % (ref 4–12)
NEUTS SEG # BLD: 4.2 K/UL (ref 1.7–8.2)
NEUTS SEG NFR BLD: 64 % (ref 43–78)
NRBC # BLD: 0 K/UL (ref 0–0.2)
PLATELET # BLD AUTO: 223 K/UL (ref 150–450)
PMV BLD AUTO: 10.7 FL (ref 9.4–12.3)
RBC # BLD AUTO: 4.4 M/UL (ref 4.23–5.6)
WBC # BLD AUTO: 6.5 K/UL (ref 4.3–11.1)

## 2023-01-27 PROCEDURE — 1123F ACP DISCUSS/DSCN MKR DOCD: CPT

## 2023-01-27 PROCEDURE — 3078F DIAST BP <80 MM HG: CPT

## 2023-01-27 PROCEDURE — G0439 PPPS, SUBSEQ VISIT: HCPCS

## 2023-01-27 PROCEDURE — 3074F SYST BP LT 130 MM HG: CPT

## 2023-01-27 ASSESSMENT — PATIENT HEALTH QUESTIONNAIRE - PHQ9
SUM OF ALL RESPONSES TO PHQ QUESTIONS 1-9: 0
SUM OF ALL RESPONSES TO PHQ QUESTIONS 1-9: 0
2. FEELING DOWN, DEPRESSED OR HOPELESS: 0
SUM OF ALL RESPONSES TO PHQ QUESTIONS 1-9: 0
SUM OF ALL RESPONSES TO PHQ QUESTIONS 1-9: 0
SUM OF ALL RESPONSES TO PHQ9 QUESTIONS 1 & 2: 0
1. LITTLE INTEREST OR PLEASURE IN DOING THINGS: 0

## 2023-01-27 ASSESSMENT — LIFESTYLE VARIABLES
HOW MANY STANDARD DRINKS CONTAINING ALCOHOL DO YOU HAVE ON A TYPICAL DAY: 1 OR 2
HOW OFTEN DO YOU HAVE A DRINK CONTAINING ALCOHOL: 2-4 TIMES A MONTH

## 2023-01-27 NOTE — PROGRESS NOTES
Medicare Annual Wellness Visit    Zaid Victor is here for No chief complaint on file. Assessment & Plan   Medicare annual wellness visit, subsequent      Recommendations for Preventive Services Due: see orders and patient instructions/AVS.  Recommended screening schedule for the next 5-10 years is provided to the patient in written form: see Patient Instructions/AVS.     Return for Medicare Annual Wellness Visit in 1 year. Subjective       Patient's complete Health Risk Assessment and screening values have been reviewed and are found in Flowsheets. The following problems were reviewed today and where indicated follow up appointments were made and/or referrals ordered. Positive Risk Factor Screenings with Interventions:                 Weight and Activity:  Physical Activity: Insufficiently Active    Days of Exercise per Week: 3 days    Minutes of Exercise per Session: 30 min     On average, how many days per week do you engage in moderate to strenuous exercise (like a brisk walk)?: 3 days  Have you lost any weight without trying in the past 3 months?: No  Body mass index: (!) 29.5        Dentist Screen:  Have you seen the dentist within the past year?: (!) No    Intervention:  Ideally every 6 months but if you can not do every 6 months or at least once a year. Make an appointment with dentist this year. See AVS for additional education material    Hearing Screen:  Do you or your family notice any trouble with your hearing that hasn't been managed with hearing aids?: (!) Yes    Interventions:  Already went to audiologist and has hearing aids already, wears them when watching TV.    See AVS for additional education material     Safety:  Do you have working smoke detectors?: (!) No  Do you have either shower bars, grab bars, non-slip mats or non-slip surfaces in your shower or bathtub?: (!) No  Do you always fasten your seatbelt when you are in a car?: (!) No  Interventions:  Suggested anti-slip mats, he said he would get one. See AVS for additional education material     Advanced Directives:  Do you have a Living Will?: (!) No    Intervention:  has NO advanced directive - information provided  Discussed with him a referral to [facility], he says that he wants to discuss it with his wife and will let Dr. Carlitos Reynolds know if he wants the referral.                        Objective   Vitals:    01/27/23 1054   BP: 106/60   Pulse: 61   Temp: 98 °F (36.7 °C)   SpO2: 95%   Weight: 205 lb 9.6 oz (93.3 kg)   Height: 5' 10\" (1.778 m)      Body mass index is 29.5 kg/m². General Appearance: alert and oriented to person, place and time, well-developed and well-nourished, in no acute distress  Skin: warm and dry, no rash or erythema  Pulmonary/Chest: clear to auscultation bilaterally- no wheezes, rales or rhonchi, normal air movement, no respiratory distress  Cardiovascular: normal rate and regular rhythm       No Known Allergies  Prior to Visit Medications    Medication Sig Taking?  Authorizing Provider   olmesartan (BENICAR) 20 MG tablet Take 20 mg by mouth daily Yes Ar Automatic Reconciliation       CareTeam (Including outside providers/suppliers regularly involved in providing care):   Patient Care Team:  Niurka Ritter MD as PCP - Shabnam Munoz MD as PCP - [facility] Empaneled Provider  Kailyn Solano MD (General Surgery)     Reviewed and updated this visit:  Allergies  Meds  Med Hx  Surg Hx  Soc Hx  Fam Hx

## 2023-01-27 NOTE — PATIENT INSTRUCTIONS
Learning About Dental Care for Older Adults  Dental care for older adults: Overview  Dental care for older people is much the same as for younger adults. But older adults do have concerns that younger adults do not. Older adults may have problems with gum disease and decay on the roots of their teeth. They may need missing teeth replaced or broken fillings fixed. Or they may have dentures that need to be cared for. Some older adults may have trouble holding a toothbrush. You can help remind the person you are caring for to brush and floss their teeth or to clean their dentures. In some cases, you may need to do the brushing and other dental care tasks. People who have trouble using their hands or who have dementia may need this extra help. How can you help with dental care? Normal dental care  To keep the teeth and gums healthy:  Brush the teeth with fluoride toothpaste twice a day--in the morning and at night--and floss at least once a day. Plaque can quickly build up on the teeth of older adults. Watch for the signs of gum disease. These signs include gums that bleed after brushing or after eating hard foods, such as apples. See a dentist regularly. Many experts recommend checkups every 6 months. Keep the dentist up to date on any new medications the person is taking. Encourage a balanced diet that includes whole grains, vegetables, and fruits, and that is low in saturated fat and sodium. Encourage the person you're caring for not to use tobacco products. They can affect dental and general health. Many older adults have a fixed income and feel that they can't afford dental care. But most [facility] and [facility] have programs in which dentists help older adults by lowering fees. Contact your area's public health offices or  for information about dental care in your area.   Using a toothbrush  Older adults with arthritis sometimes have trouble brushing their teeth because they can't easily hold the toothbrush. Their hands and fingers may be stiff, painful, or weak. If this is the case, you can: Offer an electric toothbrush. Enlarge the handle of a non-electric toothbrush by wrapping a sponge, an elastic bandage, or adhesive tape around it. Push the toothbrush handle through a ball made of rubber or soft foam.  Make the handle longer and thicker by taping Popsicle sticks or tongue depressors to it. You may also be able to buy special toothbrushes, toothpaste dispensers, and floss holders. Your doctor may recommend a soft-bristle toothbrush if the person you care for bleeds easily. Bleeding can happen because of a health problem or from certain medicines. A toothpaste for sensitive teeth may help if the person you care for has sensitive teeth. How do you brush and floss someone's teeth? If the person you are caring for has a hard time cleaning their teeth on their own, you may need to brush and floss their teeth for them. It may be easiest to have the person sit and face away from you, and to sit or stand behind them. That way you can steady their head against your arm as you reach around to floss and brush their teeth. Choose a place that has good lighting and is comfortable for both of you. Before you begin, gather your supplies. You will need gloves, floss, a toothbrush, and a container to hold water if you are not near a sink. Wash and dry your hands well and put on gloves. Start by flossing:  Gently work a piece of floss between each of the teeth toward the gums. A plastic flossing tool may make this easier, and they are available at most [facility]. Curve the floss around each tooth into a U-shape and gently slide it under the gum line. Move the floss firmly up and down several times to scrape off the plaque. After you've finished flossing, throw away the used floss and begin brushing:  Wet the brush and apply toothpaste. Place the brush at a 45-degree angle where the teeth meet the gums. Press firmly, and move the brush in small circles over the surface of the teeth. Be careful not to brush too hard. Vigorous brushing can make the gums pull away from the teeth and can scratch the tooth enamel. Brush all surfaces of the teeth, on the tongue side and on the cheek side. Pay special attention to the front teeth and all surfaces of the back teeth. Brush chewing surfaces with short back-and-forth strokes. After you've finished, help the person rinse the remaining toothpaste from their mouth. Where can you learn more? Go to http://www.woods.com/ and enter F944 to learn more about \"Learning About Dental Care for Older Adults. \"  Current as of: June 16, 2022               Content Version: 13.5  © 2006-2022 Healthwise, [employer]. Care instructions adapted under license by [facility] ([facility]). If you have questions about a medical condition or this instruction, always ask your healthcare professional. [place] [address] any warranty or liability for your use of this information. Preventing Falls: Care Instructions  Overview     Getting around your home safely can be a challenge if you have injuries or health problems that make it easy for you to fall. Loose rugs and furniture in walkways are among the dangers for many older people who have problems walking or who have poor eyesight. People who have conditions such as arthritis, osteoporosis, or dementia also have to be careful not to fall. You can make your home safer with a few simple measures. Follow-up care is a key part of your treatment and safety. Be sure to make and go to all appointments, and call your doctor if you are having problems. It's also a good idea to know your test results and keep a list of the medicines you take. How can you care for yourself at home? Taking care of yourself  Exercise regularly to improve your strength, muscle tone, and balance. Walk if you can.  Swimming may be a good choice if you cannot walk easily. Have your vision and hearing checked each year or any time you notice a change. If you have trouble seeing and hearing, you might not be able to avoid objects and could lose your balance. Know the side effects of the medicines you take. Ask your doctor or pharmacist whether the medicines you take can affect your balance. Sleeping pills or sedatives can affect your balance. Limit the amount of alcohol you drink. Alcohol can impair your balance and other senses. Ask your doctor whether calluses or corns on your feet need to be removed. If you wear loose-fitting shoes because of calluses or corns, you can lose your balance and fall. Talk to your doctor if you have numbness in your feet. You may get dizzy if you do not drink enough water. To prevent dehydration, drink plenty of fluids. Choose water and other clear liquids. If you have kidney, heart, or liver disease and have to limit fluids, talk with your doctor before you increase the amount of fluids you drink. Preventing falls at home  Remove raised doorway thresholds, throw rugs, and clutter. Repair loose carpet or raised areas in the floor. Move furniture and electrical cords to keep them out of walking paths. Use nonskid floor wax, and wipe up spills right away, especially on ceramic tile floors. If you use a walker or cane, put rubber tips on it. If you use crutches, clean the bottoms of them regularly with an abrasive pad, such as steel wool. Keep your house well lit, especially stairways, porches, and outside walkways. Use night-lights in areas such as hallways and bathrooms. Add extra light switches or use remote switches (such as switches that go on or off when you clap your hands) to make it easier to turn lights on if you have to get up during the night. Install sturdy handrails on stairways. Move items in your cabinets so that the things you use a lot are on the lower shelves (about waist level).   Keep a cordless phone and a flashlight with new batteries by your bed. If possible, put a phone in each of the main rooms of your house, or carry a cell phone in case you fall and cannot reach a phone. Or, you can wear a device around your neck or wrist. You push a button that sends a signal for help. Wear low-heeled shoes that fit well and give your feet good support. Use footwear with nonskid soles. Check the heels and soles of your shoes for wear. Repair or replace worn heels or soles. Do not wear socks without shoes on smooth floors, such as wood. Walk on the grass when the sidewalks are slippery. If you live in an area that gets snow and ice in the winter, sprinkle salt on slippery steps and sidewalks. Or ask a family member or friend to do this for you. Preventing falls in the bath  Install grab bars and nonskid mats inside and outside your shower or tub and near the toilet and sinks. Use shower chairs and bath benches. Use a hand-held shower head that will allow you to sit while showering. Get into a tub or shower by putting the weaker leg in first. Get out of a tub or shower with your strong side first.  Repair loose toilet seats and consider installing a raised toilet seat to make getting on and off the toilet easier. Keep your bathroom door unlocked while you are in the shower. Where can you learn more? Go to http://www.arguello.com/ and enter G117 to learn more about \"Preventing Falls: Care Instructions. \"  Current as of: May 4, 2022               Content Version: 13.5  © 3262-3850 Healthwise, Inc[employer]. Care instructions adapted under license by [facility] ([facility]). If you have questions about a medical condition or this instruction, always ask your healthcare professional. [place] [address] any warranty or liability for your use of this information. Learning About Getting In and Out of a Bathtub Safely  Introduction  Many falls happen during bathing.  All that water makes the bathroom a slippery place. You may no longer be able to step over the tub wall comfortably and safely. You might lean on things that aren't meant to support your weight, like a towel bar or the shower curtain. There are several types of aids that will help keep you safe. You can buy them at [employer] or home improvement stores or online. What tools can help you get in and out of a tub? Tub rail and grab bar  There are many types of bars and rails you can install on the walls next to your tub or on the edge of the tub. They will help keep you safe while you're getting in or out of the tub. It's important to have them permanently installed, rather than attached with suction. Transfer bench  There are several kinds of benches or seats to use in the bathtub. One type sits in the tub and extends out over the side. You sit on the bench. Lift one leg at a time into the tub, sliding your body over as you do so. Another common tub bench sits inside the tub. To use this type you need to be able to safely step into the tub before you sit down. Nonskid mats  Water makes both the floor of the tub and the bathroom floor slippery. You can buy adhesive strips that stick to the floor of the tub. Or you can use a nonskid tub mat. Outside the tub, make sure you use a rug with a nonskid bottom. Don't use a plain towel. Hand-held shower faucet  With a hand-held faucet, you can get clean without having to lower yourself into the tub. Hang a shower curtain to keep water from spilling out onto the floor. Follow-up care is a key part of your treatment and safety. Be sure to make and go to all appointments, and call your doctor if you are having problems. It's also a good idea to know your test results and keep a list of the medicines you take. Current as of: March 9, 2022               Content Version: 13.5  © 4465-8337 [employer]. Care instructions adapted under license by [facility] ([facility]).  If you have questions about a medical condition or this instruction, always ask your healthcare professional. [address] any warranty or liability for your use of this information. Learning About Getting In and Out of a Car Safely  Introduction  If you have problems with mobility or balance, getting into a car may be difficult. It's easiest and safest to sit down in the car first and then move your legs into the car after you're seated. And if the ground is slick or icy, this method is safer for everyone. Try this:  When you get to the door, turn around so that you're facing away from the car. Reach back to hold on to something stable, such as the seat or the door frame. Sit down so that you are sitting sideways on the seat. Be careful not to hit your head on the top of the door jamb. Slide around so that you're facing front, and lift your first leg in. Then lift your second leg in. To get out of the car, do the same steps in reverse order:  When the door is open, lift your first leg out the door and put your foot on the ground. As you do the same with your second leg, slide around so you are facing out the door. Use the seat or door frame for support as you lean forward and push yourself up to a standing position. If your car seat has fabric upholstery, you might find that it's hard to slide around. Try covering the seat with something to make it easier to slide on, like a piece of plastic or vinyl. Make sure it doesn't get in the way of your seat belt. If you still have trouble, ask your physical therapist or occupational therapist to show you the best way to get in and out of a car. They can also tell you about tools that can make this easier for you. What tools can help you get in and out of a car? There are a number of devices that can make getting in and out of the car easier. You can find them at medical supply or auto stores or online.   And if you don't already have one, think about getting a disabled parking permit. Your doctor can help you. The doctor will fill out a form that you can take to the local licensing or tax office. These permits may be permanent or temporary. Grab bar and door strap  There are several types of handholds that you can install on the frame of your car door or beside the door. They can give you something to hold on to as you get in and out of the car seat. Swivel seat  A swivel seat is like a lazy [employer] or a turntable. You sit down facing sideways and then use it to turn forward as you pull your legs in. Seat belt extender  You may have a hard time with a normal seat belt. An extension may help you find and reach the end of the belt more easily. Follow-up care is a key part of your treatment and safety. Be sure to make and go to all appointments, and call your doctor if you are having problems. It's also a good idea to know your test results and keep a list of the medicines you take. Where can you learn more? Go to http://www.woods.com/ and enter K991 to learn more about \"Learning About Getting In and Out of a Car Safely. \"  Current as of: March 9, 2022               Content Version: 13.5  © 2006-2022 Healthwise, [employer]. Care instructions adapted under license by [facility] ([facility]). If you have questions about a medical condition or this instruction, always ask your healthcare professional. [place] [address] any warranty or liability for your use of this information. Advance Directives: Care Instructions  Overview  An advance directive is a legal way to state your wishes at the end of your life. It tells your family and your doctor what to do if you can't say what you want. There are two main types of advance directives. You can change them any time your wishes change. Living will. This form tells your family and your doctor your wishes about life support and other treatment.  The form is also called a declaration. Medical power of . This form lets you name a person to make treatment decisions for you when you can't speak for yourself. This person is called a health care agent (health care proxy, health care surrogate). The form is also called a durable power of  for health care. If you do not have an advance directive, decisions about your medical care may be made by a family member, or by a doctor or a  who doesn't know you. It may help to think of an advance directive as a gift to the people who care for you. If you have one, they won't have to make tough decisions by themselves. For more information, including forms for your state, see the [address] website (www.caringinfo.org/planning/advance-directives/). Follow-up care is a key part of your treatment and safety. Be sure to make and go to all appointments, and call your doctor if you are having problems. It's also a good idea to know your test results and keep a list of the medicines you take. What should you include in an advance directive? Many states have a unique advance directive form. (It may ask you to address specific issues.) Or you might use a universal form that's approved by many states. If your form doesn't tell you what to address, it may be hard to know what to include in your advance directive. Use the questions below to help you get started. Who do you want to make decisions about your medical care if you are not able to? What life-support measures do you want if you have a serious illness that gets worse over time or can't be cured? What are you most afraid of that might happen? (Maybe you're afraid of having pain, losing your independence, or being kept alive by machines.)  Where would you prefer to die? (Your home? A hospital? A nursing home?)  Do you want to donate your organs when you die? Do you want certain Restoration practices performed before you die? When should you call for help?   Be sure to contact your doctor if you have any questions. Where can you learn more? Go to http://www.arguello.com/ and enter R264 to learn more about \"Advance Directives: Care Instructions. \"  Current as of: June 16, 2022               Content Version: 13.5  © 1870-0181 Healthwise, Incorporated. Care instructions adapted under license by [facility] ([facility]). If you have questions about a medical condition or this instruction, always ask your healthcare professional. [place] [address] any warranty or liability for your use of this information. Personalized Preventive Plan for Elijah Deluca. - 1/27/2023  Medicare offers a range of preventive health benefits. Some of the tests and screenings are paid in full while other may be subject to a deductible, co-insurance, and/or copay. Some of these benefits include a comprehensive review of your medical history including lifestyle, illnesses that may run in your family, and various assessments and screenings as appropriate. After reviewing your medical record and screening and assessments performed today your provider may have ordered immunizations, labs, imaging, and/or referrals for you. A list of these orders (if applicable) as well as your Preventive Care list are included within your After Visit Summary for your review. Other Preventive Recommendations:    A preventive eye exam performed by an eye specialist is recommended every 1-2 years to screen for glaucoma; cataracts, macular degeneration, and other eye disorders. A preventive dental visit is recommended every 6 months. Try to get at least 150 minutes of exercise per week or 10,000 steps per day on a pedometer . Order or download the FREE \"Exercise & Physical Activity: Your Everyday Guide\" from [employer]. Call 4-140.868.7923 or search [employer] online. You need 5132-8554 mg of calcium and 1177-5096 IU of vitamin D per day.  It is possible to meet your calcium requirement with diet alone, but a vitamin D supplement is usually necessary to meet this goal.  When exposed to the sun, use a sunscreen that protects against both UVA and UVB radiation with an SPF of 30 or greater. Reapply every 2 to 3 hours or after sweating, drying off with a towel, or swimming. Always wear a seat belt when traveling in a car. Always wear a helmet when riding a bicycle or motorcycle.

## 2023-01-28 LAB
ALBUMIN SERPL-MCNC: 4 G/DL (ref 3.2–4.6)
ALBUMIN/GLOB SERPL: 1.3 (ref 0.4–1.6)
ALP SERPL-CCNC: 60 U/L (ref 50–136)
ALT SERPL-CCNC: 57 U/L (ref 12–65)
ANION GAP SERPL CALC-SCNC: 10 MMOL/L (ref 2–11)
AST SERPL-CCNC: 25 U/L (ref 15–37)
BILIRUB SERPL-MCNC: 0.9 MG/DL (ref 0.2–1.1)
BUN SERPL-MCNC: 21 MG/DL (ref 8–23)
CALCIUM SERPL-MCNC: 9.3 MG/DL (ref 8.3–10.4)
CHLORIDE SERPL-SCNC: 109 MMOL/L (ref 101–110)
CHOLEST SERPL-MCNC: 173 MG/DL
CO2 SERPL-SCNC: 24 MMOL/L (ref 21–32)
CREAT SERPL-MCNC: 1.6 MG/DL (ref 0.8–1.5)
GLOBULIN SER CALC-MCNC: 3 G/DL (ref 2.8–4.5)
GLUCOSE SERPL-MCNC: 90 MG/DL (ref 65–100)
HDLC SERPL-MCNC: 41 MG/DL (ref 40–60)
HDLC SERPL: 4.2
LDLC SERPL CALC-MCNC: 97.6 MG/DL
POTASSIUM SERPL-SCNC: 4.2 MMOL/L (ref 3.5–5.1)
PROT SERPL-MCNC: 7 G/DL (ref 6.3–8.2)
SODIUM SERPL-SCNC: 143 MMOL/L (ref 133–143)
TRIGL SERPL-MCNC: 172 MG/DL (ref 35–150)
VLDLC SERPL CALC-MCNC: 34.4 MG/DL (ref 6–23)

## 2023-05-01 ENCOUNTER — OFFICE VISIT (OUTPATIENT)
Dept: FAMILY MEDICINE CLINIC | Facility: CLINIC | Age: 73
End: 2023-05-01
Payer: MEDICARE

## 2023-05-01 VITALS
TEMPERATURE: 98 F | BODY MASS INDEX: 29.92 KG/M2 | HEART RATE: 65 BPM | SYSTOLIC BLOOD PRESSURE: 124 MMHG | HEIGHT: 70 IN | WEIGHT: 209 LBS | RESPIRATION RATE: 18 BRPM | DIASTOLIC BLOOD PRESSURE: 80 MMHG | OXYGEN SATURATION: 95 %

## 2023-05-01 DIAGNOSIS — I73.9 PERIPHERAL VASCULAR DISEASE, UNSPECIFIED (HCC): ICD-10-CM

## 2023-05-01 DIAGNOSIS — I10 ESSENTIAL HYPERTENSION: Primary | ICD-10-CM

## 2023-05-01 DIAGNOSIS — R53.82 CHRONIC FATIGUE: ICD-10-CM

## 2023-05-01 DIAGNOSIS — C61 PROSTATE CANCER (HCC): ICD-10-CM

## 2023-05-01 DIAGNOSIS — E78.00 PURE HYPERCHOLESTEROLEMIA: ICD-10-CM

## 2023-05-01 DIAGNOSIS — K21.9 GASTROESOPHAGEAL REFLUX DISEASE, UNSPECIFIED WHETHER ESOPHAGITIS PRESENT: ICD-10-CM

## 2023-05-01 LAB
BASOPHILS # BLD: 0.1 K/UL (ref 0–0.2)
BASOPHILS NFR BLD: 1 % (ref 0–2)
DIFFERENTIAL METHOD BLD: NORMAL
EOSINOPHIL # BLD: 0.1 K/UL (ref 0–0.8)
EOSINOPHIL NFR BLD: 2 % (ref 0.5–7.8)
ERYTHROCYTE [DISTWIDTH] IN BLOOD BY AUTOMATED COUNT: 14.2 % (ref 11.9–14.6)
HCT VFR BLD AUTO: 43.8 % (ref 41.1–50.3)
HGB BLD-MCNC: 14.6 G/DL (ref 13.6–17.2)
IMM GRANULOCYTES # BLD AUTO: 0.1 K/UL (ref 0–0.5)
IMM GRANULOCYTES NFR BLD AUTO: 2 % (ref 0–5)
LYMPHOCYTES # BLD: 0.8 K/UL (ref 0.5–4.6)
LYMPHOCYTES NFR BLD: 18 % (ref 13–44)
MCH RBC QN AUTO: 31.9 PG (ref 26.1–32.9)
MCHC RBC AUTO-ENTMCNC: 33.3 G/DL (ref 31.4–35)
MCV RBC AUTO: 95.8 FL (ref 82–102)
MONOCYTES # BLD: 0.4 K/UL (ref 0.1–1.3)
MONOCYTES NFR BLD: 10 % (ref 4–12)
NEUTS SEG # BLD: 3.1 K/UL (ref 1.7–8.2)
NEUTS SEG NFR BLD: 67 % (ref 43–78)
NRBC # BLD: 0 K/UL (ref 0–0.2)
PLATELET # BLD AUTO: 151 K/UL (ref 150–450)
PMV BLD AUTO: 11.6 FL (ref 9.4–12.3)
RBC # BLD AUTO: 4.57 M/UL (ref 4.23–5.6)
WBC # BLD AUTO: 4.6 K/UL (ref 4.3–11.1)

## 2023-05-01 PROCEDURE — 99214 OFFICE O/P EST MOD 30 MIN: CPT | Performed by: FAMILY MEDICINE

## 2023-05-01 PROCEDURE — 3017F COLORECTAL CA SCREEN DOC REV: CPT | Performed by: FAMILY MEDICINE

## 2023-05-01 PROCEDURE — G8417 CALC BMI ABV UP PARAM F/U: HCPCS | Performed by: FAMILY MEDICINE

## 2023-05-01 PROCEDURE — 1123F ACP DISCUSS/DSCN MKR DOCD: CPT | Performed by: FAMILY MEDICINE

## 2023-05-01 PROCEDURE — 3079F DIAST BP 80-89 MM HG: CPT | Performed by: FAMILY MEDICINE

## 2023-05-01 PROCEDURE — 3074F SYST BP LT 130 MM HG: CPT | Performed by: FAMILY MEDICINE

## 2023-05-01 PROCEDURE — G8427 DOCREV CUR MEDS BY ELIG CLIN: HCPCS | Performed by: FAMILY MEDICINE

## 2023-05-01 PROCEDURE — 1036F TOBACCO NON-USER: CPT | Performed by: FAMILY MEDICINE

## 2023-05-01 SDOH — ECONOMIC STABILITY: HOUSING INSECURITY
IN THE LAST 12 MONTHS, WAS THERE A TIME WHEN YOU DID NOT HAVE A STEADY PLACE TO SLEEP OR SLEPT IN A SHELTER (INCLUDING NOW)?: YES

## 2023-05-01 SDOH — ECONOMIC STABILITY: FOOD INSECURITY: WITHIN THE PAST 12 MONTHS, THE FOOD YOU BOUGHT JUST DIDN'T LAST AND YOU DIDN'T HAVE MONEY TO GET MORE.: NEVER TRUE

## 2023-05-01 SDOH — ECONOMIC STABILITY: FOOD INSECURITY: WITHIN THE PAST 12 MONTHS, YOU WORRIED THAT YOUR FOOD WOULD RUN OUT BEFORE YOU GOT MONEY TO BUY MORE.: NEVER TRUE

## 2023-05-01 SDOH — ECONOMIC STABILITY: INCOME INSECURITY: HOW HARD IS IT FOR YOU TO PAY FOR THE VERY BASICS LIKE FOOD, HOUSING, MEDICAL CARE, AND HEATING?: NOT VERY HARD

## 2023-05-01 ASSESSMENT — ENCOUNTER SYMPTOMS
DIARRHEA: 0
VOMITING: 0
SHORTNESS OF BREATH: 0
NAUSEA: 0
COUGH: 0

## 2023-05-01 ASSESSMENT — PATIENT HEALTH QUESTIONNAIRE - PHQ9
SUM OF ALL RESPONSES TO PHQ QUESTIONS 1-9: 0
1. LITTLE INTEREST OR PLEASURE IN DOING THINGS: 0
2. FEELING DOWN, DEPRESSED OR HOPELESS: 0
SUM OF ALL RESPONSES TO PHQ QUESTIONS 1-9: 0
SUM OF ALL RESPONSES TO PHQ QUESTIONS 1-9: 0
SUM OF ALL RESPONSES TO PHQ9 QUESTIONS 1 & 2: 0
SUM OF ALL RESPONSES TO PHQ QUESTIONS 1-9: 0

## 2023-05-01 NOTE — PROGRESS NOTES
Dani Galeas (:  1950) is a 81 y.o. male,Established patient, here for evaluation of the following chief complaint(s):  Follow-up (Chronic care follow up. Fasting. ), Hypertension, and Cholesterol Problem, main concern is Chronic fatigue with weight gain         ASSESSMENT/PLAN:  1. Essential hypertension- well-controlled  -     CBC with Auto Differential; Future  -     Comprehensive Metabolic Panel; Future  2. Pure hypercholesterolemia- repeat fasting labs  -     Comprehensive Metabolic Panel; Future  -     Lipid Panel; Future  3. Peripheral vascular disease, unspecified ([address]  Assessment & Plan:   Uncontrolled, continue current medications  4. Chronic fatigue  -     CBC with Auto Differential; Future  -     Comprehensive Metabolic Panel; Future  -     TSH; Future  5. Gastroesophageal reflux disease, unspecified whether esophagitis present  Assessment & Plan:   Borderline controlled, continue current medications  6. Prostate cancer [facility])  Assessment & Plan:   Monitored by specialist- no acute findings meriting change in the plan  7. BMI 29.0-29.9,adult  Assessment & Plan:   Uncontrolled, lifestyle modifications recommended  BMI handout      Return in about 3 months (around 2023) for fasting chronic care. Subjective   SUBJECTIVE/OBJECTIVE:  Hypertension  This is a chronic problem. The current episode started more than 1 year ago. The problem is unchanged. The problem is controlled. Pertinent negatives include no chest pain or shortness of breath. Hyperlipidemia  This is a chronic problem. The current episode started more than 1 year ago. The problem is controlled. Recent lipid tests were reviewed and are variable. Pertinent negatives include no chest pain or shortness of breath. Fatigue  This is a chronic problem. The current episode started more than 1 month ago. The problem occurs constantly. The problem has been unchanged. Associated symptoms include fatigue.  Pertinent

## 2023-05-02 LAB
ALBUMIN SERPL-MCNC: 4.1 G/DL (ref 3.2–4.6)
ALBUMIN/GLOB SERPL: 1.6 (ref 0.4–1.6)
ALP SERPL-CCNC: 60 U/L (ref 50–136)
ALT SERPL-CCNC: 31 U/L (ref 12–65)
ANION GAP SERPL CALC-SCNC: 4 MMOL/L (ref 2–11)
AST SERPL-CCNC: 18 U/L (ref 15–37)
BILIRUB SERPL-MCNC: 0.8 MG/DL (ref 0.2–1.1)
BUN SERPL-MCNC: 27 MG/DL (ref 8–23)
CALCIUM SERPL-MCNC: 8.9 MG/DL (ref 8.3–10.4)
CHLORIDE SERPL-SCNC: 110 MMOL/L (ref 101–110)
CHOLEST SERPL-MCNC: 196 MG/DL
CO2 SERPL-SCNC: 25 MMOL/L (ref 21–32)
CREAT SERPL-MCNC: 1.6 MG/DL (ref 0.8–1.5)
GLOBULIN SER CALC-MCNC: 2.6 G/DL (ref 2.8–4.5)
GLUCOSE SERPL-MCNC: 90 MG/DL (ref 65–100)
HDLC SERPL-MCNC: 45 MG/DL (ref 40–60)
HDLC SERPL: 4.4
LDLC SERPL CALC-MCNC: 119.8 MG/DL
POTASSIUM SERPL-SCNC: 4.6 MMOL/L (ref 3.5–5.1)
PROT SERPL-MCNC: 6.7 G/DL (ref 6.3–8.2)
SODIUM SERPL-SCNC: 139 MMOL/L (ref 133–143)
TRIGL SERPL-MCNC: 156 MG/DL (ref 35–150)
TSH, 3RD GENERATION: 2.27 UIU/ML (ref 0.36–3.74)
VLDLC SERPL CALC-MCNC: 31.2 MG/DL (ref 6–23)

## 2023-08-08 ENCOUNTER — NURSE ONLY (OUTPATIENT)
Dept: UROLOGY | Age: 73
End: 2023-08-08

## 2023-08-08 DIAGNOSIS — C61 MALIGNANT NEOPLASM OF PROSTATE (HCC): Primary | ICD-10-CM

## 2023-08-08 DIAGNOSIS — C61 MALIGNANT NEOPLASM OF PROSTATE (HCC): ICD-10-CM

## 2023-08-10 LAB — PSA SERPL DL<=0.01 NG/ML-MCNC: <0.006 NG/ML (ref 0–4)

## 2023-08-15 ENCOUNTER — OFFICE VISIT (OUTPATIENT)
Dept: UROLOGY | Age: 73
End: 2023-08-15
Payer: MEDICARE

## 2023-08-15 DIAGNOSIS — C61 MALIGNANT NEOPLASM OF PROSTATE (HCC): Primary | ICD-10-CM

## 2023-08-15 DIAGNOSIS — N52.9 ERECTILE DYSFUNCTION, UNSPECIFIED ERECTILE DYSFUNCTION TYPE: ICD-10-CM

## 2023-08-15 DIAGNOSIS — R68.82 LIBIDO, DECREASED: ICD-10-CM

## 2023-08-15 PROCEDURE — 1123F ACP DISCUSS/DSCN MKR DOCD: CPT | Performed by: UROLOGY

## 2023-08-15 PROCEDURE — 81003 URINALYSIS AUTO W/O SCOPE: CPT | Performed by: UROLOGY

## 2023-08-15 PROCEDURE — G8427 DOCREV CUR MEDS BY ELIG CLIN: HCPCS | Performed by: UROLOGY

## 2023-08-15 PROCEDURE — 3017F COLORECTAL CA SCREEN DOC REV: CPT | Performed by: UROLOGY

## 2023-08-15 PROCEDURE — 99214 OFFICE O/P EST MOD 30 MIN: CPT | Performed by: UROLOGY

## 2023-08-15 PROCEDURE — 1036F TOBACCO NON-USER: CPT | Performed by: UROLOGY

## 2023-08-15 PROCEDURE — G8417 CALC BMI ABV UP PARAM F/U: HCPCS | Performed by: UROLOGY

## 2023-08-15 NOTE — PROGRESS NOTES
[facility]  [address] [address], [address] [place] [address]  [facility] Lozada. : 1950     HPI   72 y.o., male returns in follow up for CaP. S/P RALP on 20. Path showed Kan 3+4T2cN0 with positive apical and L posterior pos margins. He has done well post op. Reports ED has returned to baseline with Cialis or Viagra. Prefers Viagra. Never filled trimix. ROGELIO has improved and he is down to one ppd. PSA remains und on 23. Reports decreased libido.       Past Medical History:   Diagnosis Date    Allergic rhinitis     Bilateral impacted cerumen     BMI 30.0-30.9,adult     BMI 30.3    Cancer (HCC)     prostate cancer- surgery    Claustrophobia     mild    Family history of colon cancer     mother and sister    GERD (gastroesophageal reflux disease)     hx of nissen fundoplication and managed with diet    History of Gaitca's esophagus     History of stomach ulcers     Hx of colonic polyps 2016    adenomas    Hypertension     controlled with medication    IBS (irritable bowel syndrome)     no current medications    Insomnia     Laryngopharyngeal reflux     Neck mass     Post-nasal drainage     Pure hypercholesterolemia 2018    Urinary incontinence      Past Surgical History:   Procedure Laterality Date    CHOLECYSTECTOMY, OPEN      COLONOSCOPY  last 16    Shasha--asc TA, sigmoid TA, random bx negative--5 year recall    COLONOSCOPY N/A 2022    COLONOSCOPY POLYPECTOMY HOT BIOPSY performed by Manisha Cobb MD at [address]    nissen fundoplication surgery due to reflux    HERNIA REPAIR Right     right inguinal    MOHS SURGERY Right 2011    X2    OTHER SURGICAL HISTORY      prostate biopsy    OTHER SURGICAL HISTORY  2013    S/P Excision of right submandibular gland with FS    PROSTATECTOMY  2019    ROTATOR CUFF REPAIR Right     TONSILLECTOMY      WISDOM TOOTH EXTRACTION       Current Outpatient Medications   Medication Sig

## 2023-08-16 LAB — TESTOST SERPL-MCNC: NORMAL NG/DL

## 2023-08-17 LAB — TESTOST SERPL-MCNC: 182 NG/DL (ref 264–916)

## 2023-08-21 LAB
TESTOST FREE SERPL-MCNC: 3.4 PG/ML (ref 6.6–18.1)
TESTOST SERPL-MCNC: 182 NG/DL (ref 264–916)

## 2024-08-13 ENCOUNTER — LAB (OUTPATIENT)
Dept: UROLOGY | Age: 74
End: 2024-08-13

## 2024-08-13 DIAGNOSIS — C61 MALIGNANT NEOPLASM OF PROSTATE (HCC): ICD-10-CM

## 2024-08-16 LAB — PSA SERPL DL<=0.01 NG/ML-MCNC: <0.006 NG/ML (ref 0–4)

## 2024-10-08 NOTE — ANESTHESIA PREPROCEDURE EVALUATION
Relevant Problems   No relevant active problems       Anesthetic History               Review of Systems / Medical History  Patient summary reviewed and pertinent labs reviewed    Pulmonary          Smoker (Former)         Neuro/Psych              Cardiovascular    Hypertension          Hyperlipidemia    Exercise tolerance: >4 METS     GI/Hepatic/Renal     GERD    Renal disease: CRI       Endo/Other             Other Findings            Physical Exam    Airway  Mallampati: II  TM Distance: > 6 cm  Neck ROM: normal range of motion   Mouth opening: Normal     Cardiovascular  Regular rate and rhythm,  S1 and S2 normal,  no murmur, click, rub, or gallop             Dental  No notable dental hx       Pulmonary  Breath sounds clear to auscultation               Abdominal         Other Findings            Anesthetic Plan    ASA: 2  Anesthesia type: general            Anesthetic plan and risks discussed with: Patient DISPLAY PLAN FREE TEXT

## 2025-01-21 ENCOUNTER — OFFICE VISIT (OUTPATIENT)
Dept: FAMILY MEDICINE CLINIC | Facility: CLINIC | Age: 75
End: 2025-01-21

## 2025-01-21 VITALS
DIASTOLIC BLOOD PRESSURE: 70 MMHG | HEART RATE: 71 BPM | BODY MASS INDEX: 29.92 KG/M2 | OXYGEN SATURATION: 94 % | SYSTOLIC BLOOD PRESSURE: 120 MMHG | WEIGHT: 209 LBS | RESPIRATION RATE: 18 BRPM | TEMPERATURE: 98 F | HEIGHT: 70 IN

## 2025-01-21 DIAGNOSIS — I10 ESSENTIAL HYPERTENSION: ICD-10-CM

## 2025-01-21 DIAGNOSIS — E78.00 PURE HYPERCHOLESTEROLEMIA: ICD-10-CM

## 2025-01-21 DIAGNOSIS — C61 MALIGNANT NEOPLASM OF PROSTATE (HCC): ICD-10-CM

## 2025-01-21 DIAGNOSIS — M54.50 CHRONIC LEFT-SIDED LOW BACK PAIN WITHOUT SCIATICA: ICD-10-CM

## 2025-01-21 DIAGNOSIS — R29.898 LEG WEAKNESS, BILATERAL: Primary | ICD-10-CM

## 2025-01-21 DIAGNOSIS — G89.29 CHRONIC LEFT-SIDED LOW BACK PAIN WITHOUT SCIATICA: ICD-10-CM

## 2025-01-21 DIAGNOSIS — K43.9 ABDOMINAL WALL HERNIA: ICD-10-CM

## 2025-01-21 DIAGNOSIS — I73.9 PERIPHERAL VASCULAR DISEASE, UNSPECIFIED (HCC): ICD-10-CM

## 2025-01-21 LAB
ALBUMIN SERPL-MCNC: 3.9 G/DL (ref 3.2–4.6)
ALBUMIN/GLOB SERPL: 1.6 (ref 1–1.9)
ALP SERPL-CCNC: 57 U/L (ref 40–129)
ALT SERPL-CCNC: 23 U/L (ref 8–55)
ANION GAP SERPL CALC-SCNC: 10 MMOL/L (ref 7–16)
AST SERPL-CCNC: 19 U/L (ref 15–37)
BASOPHILS # BLD: 0.05 K/UL (ref 0–0.2)
BASOPHILS NFR BLD: 0.9 % (ref 0–2)
BILIRUB SERPL-MCNC: 1.2 MG/DL (ref 0–1.2)
BUN SERPL-MCNC: 19 MG/DL (ref 8–23)
CALCIUM SERPL-MCNC: 9.3 MG/DL (ref 8.8–10.2)
CHLORIDE SERPL-SCNC: 110 MMOL/L (ref 98–107)
CHOLEST SERPL-MCNC: 160 MG/DL (ref 0–200)
CK SERPL-CCNC: 90 U/L (ref 21–215)
CO2 SERPL-SCNC: 25 MMOL/L (ref 20–29)
CREAT SERPL-MCNC: 1.63 MG/DL (ref 0.8–1.3)
DIFFERENTIAL METHOD BLD: ABNORMAL
EOSINOPHIL # BLD: 0.16 K/UL (ref 0–0.8)
EOSINOPHIL NFR BLD: 2.9 % (ref 0.5–7.8)
ERYTHROCYTE [DISTWIDTH] IN BLOOD BY AUTOMATED COUNT: 14.6 % (ref 11.9–14.6)
GLOBULIN SER CALC-MCNC: 2.4 G/DL (ref 2.3–3.5)
GLUCOSE SERPL-MCNC: 87 MG/DL (ref 70–99)
HCT VFR BLD AUTO: 46.1 % (ref 41.1–50.3)
HDLC SERPL-MCNC: 40 MG/DL (ref 40–60)
HDLC SERPL: 4 (ref 0–5)
HGB BLD-MCNC: 15.2 G/DL (ref 13.6–17.2)
IMM GRANULOCYTES # BLD AUTO: 0.07 K/UL (ref 0–0.5)
IMM GRANULOCYTES NFR BLD AUTO: 1.3 % (ref 0–5)
LDLC SERPL CALC-MCNC: 97 MG/DL (ref 0–100)
LYMPHOCYTES # BLD: 0.74 K/UL (ref 0.5–4.6)
LYMPHOCYTES NFR BLD: 13.6 % (ref 13–44)
MCH RBC QN AUTO: 32.1 PG (ref 26.1–32.9)
MCHC RBC AUTO-ENTMCNC: 33 G/DL (ref 31.4–35)
MCV RBC AUTO: 97.5 FL (ref 82–102)
MONOCYTES # BLD: 0.48 K/UL (ref 0.1–1.3)
MONOCYTES NFR BLD: 8.8 % (ref 4–12)
NEUTS SEG # BLD: 3.93 K/UL (ref 1.7–8.2)
NEUTS SEG NFR BLD: 72.5 % (ref 43–78)
NRBC # BLD: 0 K/UL (ref 0–0.2)
PLATELET # BLD AUTO: 144 K/UL (ref 150–450)
PMV BLD AUTO: 11.3 FL (ref 9.4–12.3)
POTASSIUM SERPL-SCNC: 4.5 MMOL/L (ref 3.5–5.1)
PROT SERPL-MCNC: 6.3 G/DL (ref 6.3–8.2)
RBC # BLD AUTO: 4.73 M/UL (ref 4.23–5.6)
SODIUM SERPL-SCNC: 145 MMOL/L (ref 136–145)
TRIGL SERPL-MCNC: 116 MG/DL (ref 0–150)
VLDLC SERPL CALC-MCNC: 23 MG/DL (ref 6–23)
WBC # BLD AUTO: 5.4 K/UL (ref 4.3–11.1)

## 2025-01-21 SDOH — ECONOMIC STABILITY: FOOD INSECURITY: WITHIN THE PAST 12 MONTHS, THE FOOD YOU BOUGHT JUST DIDN'T LAST AND YOU DIDN'T HAVE MONEY TO GET MORE.: NEVER TRUE

## 2025-01-21 SDOH — ECONOMIC STABILITY: FOOD INSECURITY: WITHIN THE PAST 12 MONTHS, YOU WORRIED THAT YOUR FOOD WOULD RUN OUT BEFORE YOU GOT MONEY TO BUY MORE.: NEVER TRUE

## 2025-01-21 ASSESSMENT — ENCOUNTER SYMPTOMS
NAUSEA: 0
SHORTNESS OF BREATH: 0
VOMITING: 0
DIARRHEA: 0
COUGH: 0
BACK PAIN: 1

## 2025-01-21 ASSESSMENT — PATIENT HEALTH QUESTIONNAIRE - PHQ9
SUM OF ALL RESPONSES TO PHQ QUESTIONS 1-9: 0
1. LITTLE INTEREST OR PLEASURE IN DOING THINGS: NOT AT ALL
SUM OF ALL RESPONSES TO PHQ9 QUESTIONS 1 & 2: 0
SUM OF ALL RESPONSES TO PHQ QUESTIONS 1-9: 0
2. FEELING DOWN, DEPRESSED OR HOPELESS: NOT AT ALL

## 2025-01-21 NOTE — PROGRESS NOTES
Azam May Jr. (:  1950) is a 74 y.o. male,Established patient, here for evaluation of the following chief complaint(s):  Back Pain (Low back, hip, and bilateral leg pain. Limiting mobility. ) and Other (Possible hernia )         Assessment & Plan  Leg weakness, bilateral   New, uncertain prognosis,  will check labs and L/S Xrays asap    Orders:  •  CK; Future  •  XR LUMBAR SPINE (MIN 4 VIEWS); Future  •  CK    Essential hypertension   Chronic, at goal (stable), continue current treatment plan  Check labs  Orders:  •  CBC with Auto Differential; Future  •  Comprehensive Metabolic Panel; Future  •  Comprehensive Metabolic Panel  •  CBC with Auto Differential    Pure hypercholesterolemia   Chronic, at goal (stable), continue current treatment plan  Repeat labs  Orders:  •  Comprehensive Metabolic Panel; Future  •  Lipid Panel; Future  •  Lipid Panel  •  Comprehensive Metabolic Panel    Peripheral vascular disease, unspecified (HCC)   Chronic, at goal (stable), continue current treatment plan  Check labs  Orders:  •  CBC with Auto Differential; Future  •  Comprehensive Metabolic Panel; Future  •  Comprehensive Metabolic Panel  •  CBC with Auto Differential    Malignant neoplasm of prostate (HCC)   Monitored by specialist- no acute findings meriting change in the plan  Check cbc, PSA due in August  Orders:  •  CBC with Auto Differential; Future  •  CBC with Auto Differential    Chronic left-sided low back pain without sciatica    Check xrays asap    Orders:  •  XR LUMBAR SPINE (MIN 4 VIEWS); Future    Abdominal wall hernia    Definite defect present- ? Hernia- check US    Orders:  •  US ABDOMEN LIMITED; Future      Return in about 2 weeks (around 2025) for office followup/recheck.       Subjective   Back Pain  This is a new problem. The current episode started more than 1 month ago. The problem occurs constantly. The problem has been gradually worsening since onset. The pain is present in the

## 2025-01-22 ENCOUNTER — HOSPITAL ENCOUNTER (OUTPATIENT)
Dept: GENERAL RADIOLOGY | Age: 75
Discharge: HOME | End: 2025-01-25
Payer: MEDICARE

## 2025-01-22 DIAGNOSIS — M54.50 CHRONIC LEFT-SIDED LOW BACK PAIN WITHOUT SCIATICA: ICD-10-CM

## 2025-01-22 DIAGNOSIS — R29.898 LEG WEAKNESS, BILATERAL: ICD-10-CM

## 2025-01-22 DIAGNOSIS — G89.29 CHRONIC LEFT-SIDED LOW BACK PAIN WITHOUT SCIATICA: ICD-10-CM

## 2025-01-22 PROCEDURE — 72110 X-RAY EXAM L-2 SPINE 4/>VWS: CPT

## 2025-02-02 DIAGNOSIS — K43.9 VENTRAL HERNIA WITHOUT OBSTRUCTION OR GANGRENE: Primary | ICD-10-CM

## 2025-02-20 ENCOUNTER — OFFICE VISIT (OUTPATIENT)
Dept: FAMILY MEDICINE CLINIC | Facility: CLINIC | Age: 75
End: 2025-02-20

## 2025-02-20 VITALS
HEART RATE: 56 BPM | RESPIRATION RATE: 14 BRPM | TEMPERATURE: 98.7 F | HEIGHT: 70 IN | WEIGHT: 208 LBS | OXYGEN SATURATION: 98 % | BODY MASS INDEX: 29.78 KG/M2 | DIASTOLIC BLOOD PRESSURE: 72 MMHG | SYSTOLIC BLOOD PRESSURE: 132 MMHG

## 2025-02-20 DIAGNOSIS — G89.29 CHRONIC LEFT-SIDED LOW BACK PAIN WITHOUT SCIATICA: ICD-10-CM

## 2025-02-20 DIAGNOSIS — R41.89 COGNITIVE DECLINE: ICD-10-CM

## 2025-02-20 DIAGNOSIS — I10 ESSENTIAL HYPERTENSION: ICD-10-CM

## 2025-02-20 DIAGNOSIS — N18.32 STAGE 3B CHRONIC KIDNEY DISEASE (HCC): ICD-10-CM

## 2025-02-20 DIAGNOSIS — Z91.81 AT HIGH RISK FOR FALLS: ICD-10-CM

## 2025-02-20 DIAGNOSIS — R29.898 LEG WEAKNESS, BILATERAL: ICD-10-CM

## 2025-02-20 DIAGNOSIS — M54.50 CHRONIC LEFT-SIDED LOW BACK PAIN WITHOUT SCIATICA: ICD-10-CM

## 2025-02-20 DIAGNOSIS — E78.00 PURE HYPERCHOLESTEROLEMIA: ICD-10-CM

## 2025-02-20 DIAGNOSIS — I73.9 PERIPHERAL VASCULAR DISEASE, UNSPECIFIED: ICD-10-CM

## 2025-02-20 DIAGNOSIS — R26.9 GAIT DISTURBANCE: ICD-10-CM

## 2025-02-20 DIAGNOSIS — K43.9 ABDOMINAL WALL HERNIA: ICD-10-CM

## 2025-02-20 DIAGNOSIS — Z00.00 MEDICARE ANNUAL WELLNESS VISIT, SUBSEQUENT: Primary | ICD-10-CM

## 2025-02-20 ASSESSMENT — ENCOUNTER SYMPTOMS
NAUSEA: 0
VOMITING: 0
SHORTNESS OF BREATH: 0
DIARRHEA: 0
COUGH: 0

## 2025-02-20 ASSESSMENT — PATIENT HEALTH QUESTIONNAIRE - PHQ9
SUM OF ALL RESPONSES TO PHQ QUESTIONS 1-9: 0
2. FEELING DOWN, DEPRESSED OR HOPELESS: NOT AT ALL
1. LITTLE INTEREST OR PLEASURE IN DOING THINGS: NOT AT ALL
SUM OF ALL RESPONSES TO PHQ9 QUESTIONS 1 & 2: 0
SUM OF ALL RESPONSES TO PHQ QUESTIONS 1-9: 0

## 2025-02-20 ASSESSMENT — LIFESTYLE VARIABLES
HOW OFTEN DO YOU HAVE A DRINK CONTAINING ALCOHOL: 2-4 TIMES A MONTH
HOW MANY STANDARD DRINKS CONTAINING ALCOHOL DO YOU HAVE ON A TYPICAL DAY: 1 OR 2

## 2025-02-20 NOTE — PROGRESS NOTES
Medicare Annual Wellness Visit    Azam May Jr. is here for Follow-up, Hypertension (Well-controlled), Chronic Kidney Disease (stable), Extremity Weakness (Legs still weak, trouble with ambulating), Other (Peripheral Vascular Disease), and Medicare AWV    Assessment & Plan   At high risk for falls  Medicare annual wellness visit, subsequent    Cognitive screen/FALL screens +, Depression and alcohol screens negative       Return in about 2 months (around 4/21/2025) for fasting chronic care.     Subjective       Patient's complete Health Risk Assessment and screening values have been reviewed and are found in Flowsheets. The following problems were reviewed today and where indicated follow up appointments were made and/or referrals ordered.    Positive Risk Factor Screenings with Interventions:    Fall Risk:  Do you feel unsteady or are you worried about falling? : no  2 or more falls in past year?: no  Fall with injury in past year?: (!) yes     Interventions:    Reviewed medications, home hazards, visual acuity, and co-morbidities that can increase risk for falls  See AVS for additional education material    Cognitive:   Clock Drawing Test (CDT): Normal  Words recalled: 0 Words Recalled  Total Score: (!) 2  Total Score Interpretation: Abnormal Mini-Cog  Interventions:  I am checking MRI of brain, labs were ok          Self-assessment of health:  In general, how would you say your health is?: (!) Poor    Interventions:  See AVS for additional education material     Inactivity:  On average, how many days per week do you engage in moderate to strenuous exercise (like a brisk walk)?: 0 days (!) Abnormal  On average, how many minutes do you engage in exercise at this level?: 0 min  Interventions:  See AVS for additional education material      Dentist Screen:  Have you seen the dentist within the past year?: (!) No    Intervention:  Advised to schedule with their dentist    Hearing Screen:  Do you or your family

## 2025-02-20 NOTE — PROGRESS NOTES
Azam May Jr. (:  1950) is a 74 y.o. male,Established patient, here for evaluation of the following chief complaint(s):  Follow-up, Hypertension (Well-controlled), Chronic Kidney Disease (stable), Extremity Weakness (Legs still weak, trouble with ambulating), Other (Peripheral Vascular Disease), and Medicare AWV         Assessment & Plan  Leg weakness, bilateral   Chronic, worsening (exacerbation),  wide based gait, trouble standing, small steps/ turns in several small steps  Check MRI of brain asap, Refer to neurology Movement disorder clinic  Orders:    [facility] - Brad Arvizu ND, Neurology, [facility]  + cognitive screen on AWV today  Essential hypertension   Chronic, at goal (stable), off med due to gait/balance issues-still good         Abdominal wall hernia    Referred to surgery= pt to call to set up appt         Cognitive decline    New over a few months, per wife- with gait distubance- suspect movement disorder- Possible NPH-check MRO of brain and refer to Neuro    Orders:    MRI BRAIN WO CONTRAST; Future    [facility] Brad Aguilera ND, Neurology, [facility]    Gait disturbance   Chronic, worsening (exacerbation), started in past few months , per wife, I just noticed last OV 25- will check Brain MRI and refer to Neurology    Orders:    MRI BRAIN WO CONTRAST; Future    [facility] Brad Aguilera ND, Neurology, [facility]    At high risk for falls    Movement disorder work up in progress    Orders:    [facility] - Brad Arvizu ND, Neurology, [facility]      Return in about 2 months (around 2025) for fasting chronic care.       Subjective   Hypertension  This is a chronic problem. The current episode started more than 1 year ago. The problem is unchanged. The problem is controlled. Pertinent negatives include no chest pain or shortness of breath.   Extremity Weakness  This is a new problem. The current episode started more than 1 month ago. The problem occurs constantly. The problem has been gradually

## 2025-02-20 NOTE — PATIENT INSTRUCTIONS
a choice, where do you want to be cared for? In your home? At a hospital or nursing home?  If you have a choice at the end of your life, where would you prefer to die? At home? In a hospital or nursing home? Somewhere else?  Would you prefer to be buried or cremated?  Do you want your organs to be donated after you die?  What should you do with your living will?  Make sure that your family members and your health care agent have copies of your living will (also called a declaration).  Give your doctor a copy of your living will. Ask to have it kept as part of your medical record. If you have more than one doctor, make sure that each one has a copy.  Put a copy of your living will where it can be easily found. For example, some people may put a copy on their refrigerator door. If you are using a digital copy, be sure your doctor, family members, and health care agent know how to find and access it.  Where can you learn more?  Go to https://[email or web address].net/patientEd and enter K356 to learn more about \"Learning About Living Bañuelos.\"  Current as of: November 16, 2023  Content Version: 14.3  © 2024 [employer].   Care instructions adapted under license by [employer]. If you have questions about a medical condition or this instruction, always ask your healthcare professional. [employer] disclaims any warranty or liability for your use of this information.         Learning About Medical Power of   What is a medical power of ?     A medical power of , also called a durable power of  for health care, is one type of the legal forms called advance directives. It lets you name the person you want to make treatment decisions for you if you can't speak or decide for yourself. The person you choose is called your health care agent. This person is also called a health care proxy or health care surrogate.  A medical power of  may be called something else in your

## 2025-02-28 ENCOUNTER — OFFICE VISIT (OUTPATIENT)
Dept: FAMILY MEDICINE CLINIC | Facility: CLINIC | Age: 75
End: 2025-02-28
Payer: MEDICARE

## 2025-02-28 VITALS
TEMPERATURE: 98.3 F | BODY MASS INDEX: 30.06 KG/M2 | WEIGHT: 210 LBS | SYSTOLIC BLOOD PRESSURE: 120 MMHG | OXYGEN SATURATION: 97 % | HEART RATE: 68 BPM | DIASTOLIC BLOOD PRESSURE: 76 MMHG | RESPIRATION RATE: 18 BRPM | HEIGHT: 70 IN

## 2025-02-28 DIAGNOSIS — R41.89 COGNITIVE DECLINE: ICD-10-CM

## 2025-02-28 DIAGNOSIS — R26.81 UNSTEADY GAIT: ICD-10-CM

## 2025-02-28 DIAGNOSIS — R29.898 LEG WEAKNESS, BILATERAL: Primary | ICD-10-CM

## 2025-02-28 PROCEDURE — G8419 CALC BMI OUT NRM PARAM NOF/U: HCPCS | Performed by: FAMILY MEDICINE

## 2025-02-28 PROCEDURE — 3078F DIAST BP <80 MM HG: CPT | Performed by: FAMILY MEDICINE

## 2025-02-28 PROCEDURE — 1123F ACP DISCUSS/DSCN MKR DOCD: CPT | Performed by: FAMILY MEDICINE

## 2025-02-28 PROCEDURE — 1160F RVW MEDS BY RX/DR IN RCRD: CPT | Performed by: FAMILY MEDICINE

## 2025-02-28 PROCEDURE — G8427 DOCREV CUR MEDS BY ELIG CLIN: HCPCS | Performed by: FAMILY MEDICINE

## 2025-02-28 PROCEDURE — G2211 COMPLEX E/M VISIT ADD ON: HCPCS | Performed by: FAMILY MEDICINE

## 2025-02-28 PROCEDURE — 99213 OFFICE O/P EST LOW 20 MIN: CPT | Performed by: FAMILY MEDICINE

## 2025-02-28 PROCEDURE — 1036F TOBACCO NON-USER: CPT | Performed by: FAMILY MEDICINE

## 2025-02-28 PROCEDURE — 3074F SYST BP LT 130 MM HG: CPT | Performed by: FAMILY MEDICINE

## 2025-02-28 PROCEDURE — 3017F COLORECTAL CA SCREEN DOC REV: CPT | Performed by: FAMILY MEDICINE

## 2025-02-28 PROCEDURE — 1159F MED LIST DOCD IN RCRD: CPT | Performed by: FAMILY MEDICINE

## 2025-02-28 RX ORDER — LORAZEPAM 1 MG/1
TABLET ORAL
Qty: 2 TABLET | Refills: 0 | Status: SHIPPED | OUTPATIENT
Start: 2025-02-28 | End: 2025-03-07

## 2025-02-28 ASSESSMENT — PATIENT HEALTH QUESTIONNAIRE - PHQ9
1. LITTLE INTEREST OR PLEASURE IN DOING THINGS: NOT AT ALL
SUM OF ALL RESPONSES TO PHQ QUESTIONS 1-9: 0
SUM OF ALL RESPONSES TO PHQ9 QUESTIONS 1 & 2: 0
2. FEELING DOWN, DEPRESSED OR HOPELESS: NOT AT ALL

## 2025-02-28 ASSESSMENT — ENCOUNTER SYMPTOMS
SHORTNESS OF BREATH: 0
NAUSEA: 0
DIARRHEA: 0
COUGH: 0
VOMITING: 0

## 2025-02-28 NOTE — PROGRESS NOTES
Azam May Jr. (:  1950) is a 74 y.o. male,Established patient, here for evaluation of the following chief complaint(s):  Other (Still having issues with gait. Was unable to get MRI due to nausea when laying down. )         Assessment & Plan  Leg weakness, bilateral   Repeat MRI attempt, but pretreat with ativan as follows:    Orders:  •  MRI BRAIN WO CONTRAST; Future  •  LORazepam (ATIVAN) 1 MG tablet; Take 1 tab 1 hour prior to MRI, may repeat just prior to scan if needed    Unsteady gait    As above    Orders:  •  MRI BRAIN WO CONTRAST; Future  •  LORazepam (ATIVAN) 1 MG tablet; Take 1 tab 1 hour prior to MRI, may repeat just prior to scan if needed    Cognitive decline    As above    Orders:  •  MRI BRAIN WO CONTRAST; Future  •  LORazepam (ATIVAN) 1 MG tablet; Take 1 tab 1 hour prior to MRI, may repeat just prior to scan if needed    FU pending above results, referred to neurology- given # to call to schedule       Subjective   Other  This is a chronic problem. The current episode started more than 1 month ago. The problem occurs constantly. The problem has been gradually worsening. Associated symptoms include fatigue and weakness. Pertinent negatives include no chest pain, chills, coughing, nausea or vomiting. The symptoms are aggravated by walking.     Review of Systems   Constitutional:  Positive for fatigue. Negative for chills.   Respiratory:  Negative for cough and shortness of breath.    Cardiovascular:  Negative for chest pain and leg swelling.   Gastrointestinal:  Negative for diarrhea, nausea and vomiting.   Musculoskeletal:  Positive for gait problem.   Neurological:  Positive for dizziness and weakness.   Psychiatric/Behavioral:  Positive for confusion.         Objective   Physical Exam  Vitals and nursing note reviewed.   Constitutional:       General: He is not in acute distress.     Appearance: Normal appearance. He is obese.   HENT:      Head: Normocephalic and atraumatic.

## 2025-03-06 ENCOUNTER — TELEPHONE (OUTPATIENT)
Dept: FAMILY MEDICINE CLINIC | Facility: CLINIC | Age: 75
End: 2025-03-06

## 2025-03-06 NOTE — TELEPHONE ENCOUNTER
Pt wife called stating that the referral for Neurologist that Dr. Andrade send they give pt an appt for September 03 and they would like to know what to do.

## 2025-03-06 NOTE — TELEPHONE ENCOUNTER
Tell them to get the MRI on 14th and we will go from there. I will be out of town until 3/24, so I may not see it before then.

## 2025-03-13 ENCOUNTER — OFFICE VISIT (OUTPATIENT)
Dept: SURGERY | Age: 75
End: 2025-03-13
Payer: MEDICARE

## 2025-03-13 ENCOUNTER — PREP FOR PROCEDURE (OUTPATIENT)
Dept: SURGERY | Age: 75
End: 2025-03-13

## 2025-03-13 VITALS
HEART RATE: 78 BPM | WEIGHT: 204 LBS | DIASTOLIC BLOOD PRESSURE: 75 MMHG | SYSTOLIC BLOOD PRESSURE: 114 MMHG | HEIGHT: 70 IN | BODY MASS INDEX: 29.2 KG/M2

## 2025-03-13 DIAGNOSIS — K43.2 INCISIONAL HERNIA OF ANTERIOR ABDOMINAL WALL WITHOUT OBSTRUCTION OR GANGRENE: Primary | ICD-10-CM

## 2025-03-13 PROCEDURE — 3017F COLORECTAL CA SCREEN DOC REV: CPT | Performed by: SURGERY

## 2025-03-13 PROCEDURE — 1036F TOBACCO NON-USER: CPT | Performed by: SURGERY

## 2025-03-13 PROCEDURE — 1159F MED LIST DOCD IN RCRD: CPT | Performed by: SURGERY

## 2025-03-13 PROCEDURE — 3074F SYST BP LT 130 MM HG: CPT | Performed by: SURGERY

## 2025-03-13 PROCEDURE — G8417 CALC BMI ABV UP PARAM F/U: HCPCS | Performed by: SURGERY

## 2025-03-13 PROCEDURE — G8427 DOCREV CUR MEDS BY ELIG CLIN: HCPCS | Performed by: SURGERY

## 2025-03-13 PROCEDURE — 3078F DIAST BP <80 MM HG: CPT | Performed by: SURGERY

## 2025-03-13 PROCEDURE — 1123F ACP DISCUSS/DSCN MKR DOCD: CPT | Performed by: SURGERY

## 2025-03-13 PROCEDURE — 99213 OFFICE O/P EST LOW 20 MIN: CPT | Performed by: SURGERY

## 2025-03-13 PROCEDURE — 1160F RVW MEDS BY RX/DR IN RCRD: CPT | Performed by: SURGERY

## 2025-03-13 NOTE — PROGRESS NOTES
Ortega Rao MD   General and Robotic surgery  [address], [address]  [place] [address]  Phone [phone]   Fax [phone]      Date of visit: 3/13/2025      Primary/Requesting provider: Nathanael Andrade Jr., MD         Name: Azam May Jr.      MRN: [id number]       : 1950       Age: 74 y.o.    Sex: male        PCP: Nathanael Andrade Jr., MD     CC:    Chief Complaint   Patient presents with    New Patient     NEW TO PROVIDER - NTP (Est Brandan  colo, Shasha  colo) Recurrent Ventral Hernia       HPI:     Azam May Jr. is a 74 y.o. male with a history of open cholecystectomy, prostatectomy and previous hernia repair; presents with not only a fairly pronounced diastases, but a reducible hernia in the upper midline.  I think as a repair he would require not only repair of the hernia but closure of the diastases to make sure the surrounding tissue to the defect was not compromised for a recurrence.  He has had a combination of open and minimally invasive procedures so he is familiar with abdominal surgery.  We have had a long discussion regarding the concept of strategic recovery and how he will need to be very mindful of his restrictions if he wants to pursue a great outcome with his procedure.  He is fairly healthy for his age, has no known ischemic cardiac disease or COPD.  I have made sure he understands that including a diastases within the repair will make the procedure a larger magnitude repair but at the end of the day a more suitable and appropriate repair.      Past Medical History:   Diagnosis Date    Allergic rhinitis     Bilateral impacted cerumen     BMI 30.0-30.9,adult     BMI 30.3    Cancer (HCC)     prostate cancer- surgery    Claustrophobia     mild    Family history of colon cancer     mother and sister    GERD (gastroesophageal reflux disease)     hx of nissen fundoplication and managed with diet    History of Gatica's esophagus

## 2025-03-14 ENCOUNTER — RESULTS FOLLOW-UP (OUTPATIENT)
Dept: MRI IMAGING | Age: 75
End: 2025-03-14

## 2025-03-14 ENCOUNTER — HOSPITAL ENCOUNTER (OUTPATIENT)
Dept: MRI IMAGING | Age: 75
Discharge: HOME | End: 2025-03-17
Attending: FAMILY MEDICINE
Payer: MEDICARE

## 2025-03-14 DIAGNOSIS — R26.81 UNSTEADY GAIT: ICD-10-CM

## 2025-03-14 DIAGNOSIS — R41.89 COGNITIVE DECLINE: ICD-10-CM

## 2025-03-14 DIAGNOSIS — R29.898 LEG WEAKNESS, BILATERAL: ICD-10-CM

## 2025-03-14 PROCEDURE — 70551 MRI BRAIN STEM W/O DYE: CPT

## 2025-03-18 DIAGNOSIS — G91.2 NORMAL PRESSURE HYDROCEPHALUS (HCC): Primary | ICD-10-CM

## 2025-03-18 DIAGNOSIS — R26.81 UNSTEADY GAIT: ICD-10-CM

## 2025-04-24 NOTE — PERIOP NOTE
Patient verified name and .  Order for consent  was not found in EHR.  Type 2 surgery, PAT phone assessment complete.    Labs per surgeon: Orders not received.  Labs per anesthesia protocol: Hgb- Pt instructed to come for lab work (Monday- Friday 8:00 AM- 3:00 PM) prior to surgery day. Pt voiced an understanding.      Patient answered medical/surgical history questions at their best of ability. All prior to admission medications documented in EPIC.    Patient instructed to continue taking all prescription medications up to the day of surgery but to take only the following medications the day of surgery according to anesthesia guidelines with a small sip of water: none. Also, patient is requested to take 2 Tylenol in the morning and then again before bed on the day before surgery. Regular or extra strength may be used.       Patient informed that all vitamins and supplements should be held 7 days prior to surgery and NSAIDS 5 days prior to surgery.     Patient instructed on the following:    > Arrive at A Entrance, time of arrival to be called the day before by 1700  > No food after midnight, patient may drink clear liquids up until 2 hours prior to arrival. No gum, candy, mints.   > Responsible adult must drive patient to the hospital, stay during surgery, and patient will need supervision 24 hours after anesthesia  > Use non moisturizing soap in shower the night before surgery and on the morning of surgery  > All piercings must be removed prior to arrival.    > Leave all valuables (money and jewelry) at home but bring insurance card and ID on DOS.   > You may be required to pay a deductible or co-pay on the day of your procedure. You can pre-pay by calling [phone] if your surgery is at the [facility] or [phone] if your surgery is at the [facility].  > Do not wear make-up, nail polish, lotions, cologne, perfumes, powders, or oil on skin. Artificial nails are not permitted.

## 2025-05-01 ENCOUNTER — TELEPHONE (OUTPATIENT)
Dept: SURGERY | Age: 75
End: 2025-05-01

## 2025-05-01 NOTE — TELEPHONE ENCOUNTER
Returned patients wife call, they have not heard from the hospital to let them know what time show up for his surgery,  They called her a few minutes before I returned her call.  She also wanted to know if he was going to be admitted after his surgery.  We have it as outpatient going in, if there is a reason for him to stay overnight they will let her know after his surgery.  She voiced her understanding.

## 2025-05-02 ENCOUNTER — ANESTHESIA EVENT (OUTPATIENT)
Dept: SURGERY | Age: 75
End: 2025-05-02
Payer: MEDICARE

## 2025-05-02 ENCOUNTER — HOSPITAL ENCOUNTER (OUTPATIENT)
Age: 75
Setting detail: OBSERVATION
Discharge: HOME | End: 2025-05-04
Attending: SURGERY | Admitting: SURGERY
Payer: MEDICARE

## 2025-05-02 ENCOUNTER — ANESTHESIA (OUTPATIENT)
Dept: SURGERY | Age: 75
End: 2025-05-02
Payer: MEDICARE

## 2025-05-02 DIAGNOSIS — K43.2 INCISIONAL HERNIA, WITHOUT OBSTRUCTION OR GANGRENE: ICD-10-CM

## 2025-05-02 DIAGNOSIS — Z01.818 PRE-OP TESTING: Primary | ICD-10-CM

## 2025-05-02 PROBLEM — R09.02 HYPOXIA: Status: ACTIVE | Noted: 2025-05-02

## 2025-05-02 PROBLEM — Z98.890 STATUS POST SURGERY: Status: ACTIVE | Noted: 2025-05-02

## 2025-05-02 LAB — HGB BLD-MCNC: 14.3 G/DL (ref 13.6–17.2)

## 2025-05-02 PROCEDURE — 6370000000 HC RX 637 (ALT 250 FOR IP): Performed by: STUDENT IN AN ORGANIZED HEALTH CARE EDUCATION/TRAINING PROGRAM

## 2025-05-02 PROCEDURE — 85018 HEMOGLOBIN: CPT

## 2025-05-02 PROCEDURE — 2709999900 HC NON-CHARGEABLE SUPPLY: Performed by: SURGERY

## 2025-05-02 PROCEDURE — 3600000009 HC SURGERY ROBOT BASE: Performed by: SURGERY

## 2025-05-02 PROCEDURE — 2500000003 HC RX 250 WO HCPCS: Performed by: NURSE ANESTHETIST, CERTIFIED REGISTERED

## 2025-05-02 PROCEDURE — 2580000003 HC RX 258: Performed by: ANESTHESIOLOGY

## 2025-05-02 PROCEDURE — 6360000002 HC RX W HCPCS: Performed by: NURSE ANESTHETIST, CERTIFIED REGISTERED

## 2025-05-02 PROCEDURE — C1781 MESH (IMPLANTABLE): HCPCS | Performed by: SURGERY

## 2025-05-02 PROCEDURE — 2700000000 HC OXYGEN THERAPY PER DAY

## 2025-05-02 PROCEDURE — 6360000002 HC RX W HCPCS: Performed by: NURSE PRACTITIONER

## 2025-05-02 PROCEDURE — 6360000002 HC RX W HCPCS: Performed by: ANESTHESIOLOGY

## 2025-05-02 PROCEDURE — 6360000002 HC RX W HCPCS: Performed by: SURGERY

## 2025-05-02 PROCEDURE — C1889 IMPLANT/INSERT DEVICE, NOC: HCPCS | Performed by: SURGERY

## 2025-05-02 PROCEDURE — 2580000003 HC RX 258: Performed by: NURSE PRACTITIONER

## 2025-05-02 PROCEDURE — 2500000003 HC RX 250 WO HCPCS: Performed by: NURSE PRACTITIONER

## 2025-05-02 PROCEDURE — 6360000002 HC RX W HCPCS

## 2025-05-02 PROCEDURE — 3700000001 HC ADD 15 MINUTES (ANESTHESIA): Performed by: SURGERY

## 2025-05-02 PROCEDURE — 3600000019 HC SURGERY ROBOT ADDTL 15MIN: Performed by: SURGERY

## 2025-05-02 PROCEDURE — 2500000003 HC RX 250 WO HCPCS: Performed by: SURGERY

## 2025-05-02 PROCEDURE — 6360000002 HC RX W HCPCS: Performed by: STUDENT IN AN ORGANIZED HEALTH CARE EDUCATION/TRAINING PROGRAM

## 2025-05-02 PROCEDURE — G0378 HOSPITAL OBSERVATION PER HR: HCPCS

## 2025-05-02 PROCEDURE — 7100000000 HC PACU RECOVERY - FIRST 15 MIN: Performed by: SURGERY

## 2025-05-02 PROCEDURE — 3700000000 HC ANESTHESIA ATTENDED CARE: Performed by: SURGERY

## 2025-05-02 PROCEDURE — S2900 ROBOTIC SURGICAL SYSTEM: HCPCS | Performed by: SURGERY

## 2025-05-02 PROCEDURE — 6370000000 HC RX 637 (ALT 250 FOR IP): Performed by: ANESTHESIOLOGY

## 2025-05-02 PROCEDURE — 7100000001 HC PACU RECOVERY - ADDTL 15 MIN: Performed by: SURGERY

## 2025-05-02 PROCEDURE — 94760 N-INVAS EAR/PLS OXIMETRY 1: CPT

## 2025-05-02 DEVICE — IMPLANTABLE DEVICE: Type: IMPLANTABLE DEVICE | Site: ABDOMEN | Status: FUNCTIONAL

## 2025-05-02 DEVICE — CLIP INT M L POLYMER LOK LIG HEM O LOK: Type: IMPLANTABLE DEVICE | Site: ABDOMEN | Status: FUNCTIONAL

## 2025-05-02 RX ORDER — SODIUM CHLORIDE 9 MG/ML
INJECTION, SOLUTION INTRAVENOUS PRN
Status: DISCONTINUED | OUTPATIENT
Start: 2025-05-02 | End: 2025-05-02 | Stop reason: HOSPADM

## 2025-05-02 RX ORDER — SODIUM CHLORIDE 0.9 % (FLUSH) 0.9 %
5-40 SYRINGE (ML) INJECTION EVERY 12 HOURS SCHEDULED
Status: DISCONTINUED | OUTPATIENT
Start: 2025-05-02 | End: 2025-05-02

## 2025-05-02 RX ORDER — TRAMADOL HYDROCHLORIDE 50 MG/1
50 TABLET ORAL EVERY 6 HOURS PRN
Qty: 12 TABLET | Refills: 0 | Status: SHIPPED | OUTPATIENT
Start: 2025-05-02 | End: 2025-05-05

## 2025-05-02 RX ORDER — SODIUM CHLORIDE, SODIUM LACTATE, POTASSIUM CHLORIDE, CALCIUM CHLORIDE 600; 310; 30; 20 MG/100ML; MG/100ML; MG/100ML; MG/100ML
INJECTION, SOLUTION INTRAVENOUS CONTINUOUS
Status: ACTIVE | OUTPATIENT
Start: 2025-05-02 | End: 2025-05-03

## 2025-05-02 RX ORDER — ONDANSETRON 4 MG/1
4 TABLET, ORALLY DISINTEGRATING ORAL EVERY 8 HOURS PRN
Status: DISCONTINUED | OUTPATIENT
Start: 2025-05-02 | End: 2025-05-04 | Stop reason: HOSPADM

## 2025-05-02 RX ORDER — GLYCOPYRROLATE 0.2 MG/ML
INJECTION INTRAMUSCULAR; INTRAVENOUS
Status: DISCONTINUED | OUTPATIENT
Start: 2025-05-02 | End: 2025-05-02 | Stop reason: SDUPTHER

## 2025-05-02 RX ORDER — LIDOCAINE HYDROCHLORIDE 10 MG/ML
1 INJECTION, SOLUTION INFILTRATION; PERINEURAL
Status: DISCONTINUED | OUTPATIENT
Start: 2025-05-02 | End: 2025-05-02 | Stop reason: HOSPADM

## 2025-05-02 RX ORDER — SODIUM CHLORIDE, SODIUM LACTATE, POTASSIUM CHLORIDE, CALCIUM CHLORIDE 600; 310; 30; 20 MG/100ML; MG/100ML; MG/100ML; MG/100ML
INJECTION, SOLUTION INTRAVENOUS CONTINUOUS
Status: DISCONTINUED | OUTPATIENT
Start: 2025-05-02 | End: 2025-05-02 | Stop reason: HOSPADM

## 2025-05-02 RX ORDER — FENTANYL CITRATE 50 UG/ML
INJECTION, SOLUTION INTRAMUSCULAR; INTRAVENOUS
Status: DISCONTINUED | OUTPATIENT
Start: 2025-05-02 | End: 2025-05-02 | Stop reason: SDUPTHER

## 2025-05-02 RX ORDER — MIDAZOLAM HYDROCHLORIDE 2 MG/2ML
2 INJECTION, SOLUTION INTRAMUSCULAR; INTRAVENOUS
Status: DISCONTINUED | OUTPATIENT
Start: 2025-05-02 | End: 2025-05-02 | Stop reason: HOSPADM

## 2025-05-02 RX ORDER — ACETAMINOPHEN 500 MG
1000 TABLET ORAL ONCE
Status: DISCONTINUED | OUTPATIENT
Start: 2025-05-02 | End: 2025-05-02 | Stop reason: HOSPADM

## 2025-05-02 RX ORDER — OXYCODONE HYDROCHLORIDE 5 MG/1
5 TABLET ORAL EVERY 4 HOURS PRN
Refills: 0 | Status: DISCONTINUED | OUTPATIENT
Start: 2025-05-02 | End: 2025-05-04 | Stop reason: HOSPADM

## 2025-05-02 RX ORDER — POTASSIUM CHLORIDE 7.45 MG/ML
10 INJECTION INTRAVENOUS PRN
Status: DISCONTINUED | OUTPATIENT
Start: 2025-05-02 | End: 2025-05-04 | Stop reason: HOSPADM

## 2025-05-02 RX ORDER — SODIUM CHLORIDE 0.9 % (FLUSH) 0.9 %
5-40 SYRINGE (ML) INJECTION PRN
Status: DISCONTINUED | OUTPATIENT
Start: 2025-05-02 | End: 2025-05-02

## 2025-05-02 RX ORDER — ONDANSETRON 2 MG/ML
4 INJECTION INTRAMUSCULAR; INTRAVENOUS
Status: DISCONTINUED | OUTPATIENT
Start: 2025-05-02 | End: 2025-05-02

## 2025-05-02 RX ORDER — ONDANSETRON 2 MG/ML
INJECTION INTRAMUSCULAR; INTRAVENOUS
Status: DISCONTINUED | OUTPATIENT
Start: 2025-05-02 | End: 2025-05-02 | Stop reason: SDUPTHER

## 2025-05-02 RX ORDER — PROPOFOL 10 MG/ML
INJECTION, EMULSION INTRAVENOUS
Status: DISCONTINUED | OUTPATIENT
Start: 2025-05-02 | End: 2025-05-02 | Stop reason: SDUPTHER

## 2025-05-02 RX ORDER — ACETAMINOPHEN 325 MG/1
650 TABLET ORAL EVERY 6 HOURS PRN
Status: DISCONTINUED | OUTPATIENT
Start: 2025-05-02 | End: 2025-05-04 | Stop reason: HOSPADM

## 2025-05-02 RX ORDER — SODIUM CHLORIDE 0.9 % (FLUSH) 0.9 %
5-40 SYRINGE (ML) INJECTION EVERY 12 HOURS SCHEDULED
Status: DISCONTINUED | OUTPATIENT
Start: 2025-05-02 | End: 2025-05-02 | Stop reason: HOSPADM

## 2025-05-02 RX ORDER — NEOSTIGMINE METHYLSULFATE 1 MG/ML
INJECTION INTRAVENOUS
Status: DISCONTINUED | OUTPATIENT
Start: 2025-05-02 | End: 2025-05-02 | Stop reason: SDUPTHER

## 2025-05-02 RX ORDER — SODIUM CHLORIDE 0.9 % (FLUSH) 0.9 %
5-40 SYRINGE (ML) INJECTION PRN
Status: DISCONTINUED | OUTPATIENT
Start: 2025-05-02 | End: 2025-05-04 | Stop reason: HOSPADM

## 2025-05-02 RX ORDER — SODIUM CHLORIDE 0.9 % (FLUSH) 0.9 %
5-40 SYRINGE (ML) INJECTION EVERY 12 HOURS SCHEDULED
Status: DISCONTINUED | OUTPATIENT
Start: 2025-05-02 | End: 2025-05-04 | Stop reason: HOSPADM

## 2025-05-02 RX ORDER — OXYCODONE HYDROCHLORIDE 5 MG/1
10 TABLET ORAL PRN
Status: COMPLETED | OUTPATIENT
Start: 2025-05-02 | End: 2025-05-02

## 2025-05-02 RX ORDER — ONDANSETRON 2 MG/ML
4 INJECTION INTRAMUSCULAR; INTRAVENOUS EVERY 6 HOURS PRN
Status: DISCONTINUED | OUTPATIENT
Start: 2025-05-02 | End: 2025-05-04 | Stop reason: HOSPADM

## 2025-05-02 RX ORDER — SODIUM CHLORIDE 0.9 % (FLUSH) 0.9 %
5-40 SYRINGE (ML) INJECTION PRN
Status: DISCONTINUED | OUTPATIENT
Start: 2025-05-02 | End: 2025-05-02 | Stop reason: HOSPADM

## 2025-05-02 RX ORDER — SODIUM CHLORIDE 9 MG/ML
INJECTION, SOLUTION INTRAVENOUS PRN
Status: DISCONTINUED | OUTPATIENT
Start: 2025-05-02 | End: 2025-05-02

## 2025-05-02 RX ORDER — ACETAMINOPHEN 500 MG
1000 TABLET ORAL ONCE
Status: COMPLETED | OUTPATIENT
Start: 2025-05-02 | End: 2025-05-02

## 2025-05-02 RX ORDER — LABETALOL HYDROCHLORIDE 5 MG/ML
5 INJECTION, SOLUTION INTRAVENOUS ONCE
Status: COMPLETED | OUTPATIENT
Start: 2025-05-02 | End: 2025-05-02

## 2025-05-02 RX ORDER — OXYCODONE HYDROCHLORIDE 5 MG/1
5 TABLET ORAL PRN
Status: COMPLETED | OUTPATIENT
Start: 2025-05-02 | End: 2025-05-02

## 2025-05-02 RX ORDER — SODIUM CHLORIDE, SODIUM LACTATE, POTASSIUM CHLORIDE, CALCIUM CHLORIDE 600; 310; 30; 20 MG/100ML; MG/100ML; MG/100ML; MG/100ML
INJECTION, SOLUTION INTRAVENOUS CONTINUOUS
Status: DISCONTINUED | OUTPATIENT
Start: 2025-05-02 | End: 2025-05-02

## 2025-05-02 RX ORDER — MAGNESIUM SULFATE IN WATER 40 MG/ML
2000 INJECTION, SOLUTION INTRAVENOUS PRN
Status: DISCONTINUED | OUTPATIENT
Start: 2025-05-02 | End: 2025-05-04 | Stop reason: HOSPADM

## 2025-05-02 RX ORDER — POTASSIUM CHLORIDE 1500 MG/1
40 TABLET, EXTENDED RELEASE ORAL PRN
Status: DISCONTINUED | OUTPATIENT
Start: 2025-05-02 | End: 2025-05-04 | Stop reason: HOSPADM

## 2025-05-02 RX ORDER — KETAMINE HCL IN NACL, ISO-OSM 20 MG/2 ML
SYRINGE (ML) INJECTION
Status: DISCONTINUED | OUTPATIENT
Start: 2025-05-02 | End: 2025-05-02 | Stop reason: SDUPTHER

## 2025-05-02 RX ORDER — NALOXONE HYDROCHLORIDE 0.4 MG/ML
INJECTION, SOLUTION INTRAMUSCULAR; INTRAVENOUS; SUBCUTANEOUS PRN
Status: DISCONTINUED | OUTPATIENT
Start: 2025-05-02 | End: 2025-05-02

## 2025-05-02 RX ORDER — MIDAZOLAM HYDROCHLORIDE 1 MG/ML
INJECTION, SOLUTION INTRAMUSCULAR; INTRAVENOUS
Status: DISCONTINUED | OUTPATIENT
Start: 2025-05-02 | End: 2025-05-02 | Stop reason: SDUPTHER

## 2025-05-02 RX ORDER — ROCURONIUM BROMIDE 10 MG/ML
INJECTION, SOLUTION INTRAVENOUS
Status: DISCONTINUED | OUTPATIENT
Start: 2025-05-02 | End: 2025-05-02 | Stop reason: SDUPTHER

## 2025-05-02 RX ORDER — HYDROMORPHONE HYDROCHLORIDE 2 MG/ML
INJECTION, SOLUTION INTRAMUSCULAR; INTRAVENOUS; SUBCUTANEOUS
Status: DISCONTINUED | OUTPATIENT
Start: 2025-05-02 | End: 2025-05-02 | Stop reason: SDUPTHER

## 2025-05-02 RX ORDER — BUPIVACAINE HYDROCHLORIDE AND EPINEPHRINE 5; 5 MG/ML; UG/ML
INJECTION, SOLUTION EPIDURAL; INTRACAUDAL; PERINEURAL PRN
Status: DISCONTINUED | OUTPATIENT
Start: 2025-05-02 | End: 2025-05-02 | Stop reason: ALTCHOICE

## 2025-05-02 RX ORDER — TRAMADOL HYDROCHLORIDE 50 MG/1
50 TABLET ORAL EVERY 6 HOURS PRN
Status: DISCONTINUED | OUTPATIENT
Start: 2025-05-02 | End: 2025-05-04 | Stop reason: HOSPADM

## 2025-05-02 RX ORDER — PROCHLORPERAZINE EDISYLATE 5 MG/ML
5 INJECTION INTRAMUSCULAR; INTRAVENOUS
Status: DISCONTINUED | OUTPATIENT
Start: 2025-05-02 | End: 2025-05-02

## 2025-05-02 RX ORDER — DIPHENHYDRAMINE HYDROCHLORIDE 50 MG/ML
12.5 INJECTION, SOLUTION INTRAMUSCULAR; INTRAVENOUS
Status: DISCONTINUED | OUTPATIENT
Start: 2025-05-02 | End: 2025-05-02

## 2025-05-02 RX ORDER — LIDOCAINE HYDROCHLORIDE 20 MG/ML
INJECTION, SOLUTION EPIDURAL; INFILTRATION; INTRACAUDAL; PERINEURAL
Status: DISCONTINUED | OUTPATIENT
Start: 2025-05-02 | End: 2025-05-02 | Stop reason: SDUPTHER

## 2025-05-02 RX ORDER — DEXAMETHASONE SODIUM PHOSPHATE 10 MG/ML
INJECTION, SOLUTION INTRA-ARTICULAR; INTRALESIONAL; INTRAMUSCULAR; INTRAVENOUS; SOFT TISSUE
Status: DISCONTINUED | OUTPATIENT
Start: 2025-05-02 | End: 2025-05-02 | Stop reason: SDUPTHER

## 2025-05-02 RX ORDER — ACETAMINOPHEN 650 MG/1
650 SUPPOSITORY RECTAL EVERY 6 HOURS PRN
Status: DISCONTINUED | OUTPATIENT
Start: 2025-05-02 | End: 2025-05-04 | Stop reason: HOSPADM

## 2025-05-02 RX ORDER — EPHEDRINE SULFATE 5 MG/ML
INJECTION INTRAVENOUS
Status: DISCONTINUED | OUTPATIENT
Start: 2025-05-02 | End: 2025-05-02 | Stop reason: SDUPTHER

## 2025-05-02 RX ORDER — SODIUM CHLORIDE 9 MG/ML
INJECTION, SOLUTION INTRAVENOUS PRN
Status: DISCONTINUED | OUTPATIENT
Start: 2025-05-02 | End: 2025-05-04 | Stop reason: HOSPADM

## 2025-05-02 RX ADMIN — MIDAZOLAM 2 MG: 1 INJECTION INTRAMUSCULAR; INTRAVENOUS at 13:22

## 2025-05-02 RX ADMIN — FAMOTIDINE 20 MG: 10 INJECTION, SOLUTION INTRAVENOUS at 20:03

## 2025-05-02 RX ADMIN — Medication 20 MG: at 13:34

## 2025-05-02 RX ADMIN — FENTANYL CITRATE 50 MCG: 50 INJECTION, SOLUTION INTRAMUSCULAR; INTRAVENOUS at 13:38

## 2025-05-02 RX ADMIN — LIDOCAINE HYDROCHLORIDE 100 MG: 20 INJECTION, SOLUTION EPIDURAL; INFILTRATION; INTRACAUDAL; PERINEURAL at 13:34

## 2025-05-02 RX ADMIN — OXYCODONE HYDROCHLORIDE 5 MG: 5 TABLET ORAL at 18:21

## 2025-05-02 RX ADMIN — HYDROMORPHONE HYDROCHLORIDE 0.5 MG: 1 INJECTION, SOLUTION INTRAMUSCULAR; INTRAVENOUS; SUBCUTANEOUS at 17:01

## 2025-05-02 RX ADMIN — DEXAMETHASONE SODIUM PHOSPHATE 8 MG: 10 INJECTION INTRAMUSCULAR; INTRAVENOUS at 13:40

## 2025-05-02 RX ADMIN — Medication 2000 MG: at 13:32

## 2025-05-02 RX ADMIN — EPHEDRINE SULFATE 7.5 MG: 5 INJECTION INTRAVENOUS at 14:43

## 2025-05-02 RX ADMIN — ACETAMINOPHEN 1000 MG: 500 TABLET, FILM COATED ORAL at 17:55

## 2025-05-02 RX ADMIN — NEOSTIGMINE METHYLSULFATE 3 MG: 1 INJECTION INTRAVENOUS at 15:36

## 2025-05-02 RX ADMIN — FENTANYL CITRATE 50 MCG: 50 INJECTION, SOLUTION INTRAMUSCULAR; INTRAVENOUS at 13:34

## 2025-05-02 RX ADMIN — PROPOFOL 50 MG: 10 INJECTION, EMULSION INTRAVENOUS at 15:37

## 2025-05-02 RX ADMIN — HYDROMORPHONE HYDROCHLORIDE 0.5 MG: 1 INJECTION, SOLUTION INTRAMUSCULAR; INTRAVENOUS; SUBCUTANEOUS at 17:59

## 2025-05-02 RX ADMIN — GLYCOPYRROLATE 0.4 MG: 0.2 INJECTION INTRAMUSCULAR; INTRAVENOUS at 15:36

## 2025-05-02 RX ADMIN — SODIUM CHLORIDE, SODIUM LACTATE, POTASSIUM CHLORIDE, AND CALCIUM CHLORIDE: 600; 310; 30; 20 INJECTION, SOLUTION INTRAVENOUS at 14:16

## 2025-05-02 RX ADMIN — ONDANSETRON 4 MG: 2 INJECTION, SOLUTION INTRAMUSCULAR; INTRAVENOUS at 13:50

## 2025-05-02 RX ADMIN — PROPOFOL 200 MG: 10 INJECTION, EMULSION INTRAVENOUS at 13:34

## 2025-05-02 RX ADMIN — SODIUM CHLORIDE, SODIUM LACTATE, POTASSIUM CHLORIDE, AND CALCIUM CHLORIDE: 600; 310; 30; 20 INJECTION, SOLUTION INTRAVENOUS at 13:02

## 2025-05-02 RX ADMIN — SODIUM CHLORIDE, PRESERVATIVE FREE 10 ML: 5 INJECTION INTRAVENOUS at 20:03

## 2025-05-02 RX ADMIN — GLYCOPYRROLATE 0.1 MG: 0.2 INJECTION INTRAMUSCULAR; INTRAVENOUS at 14:09

## 2025-05-02 RX ADMIN — ROCURONIUM BROMIDE 40 MG: 10 INJECTION INTRAVENOUS at 13:34

## 2025-05-02 RX ADMIN — SODIUM CHLORIDE, SODIUM LACTATE, POTASSIUM CHLORIDE, AND CALCIUM CHLORIDE: .6; .31; .03; .02 INJECTION, SOLUTION INTRAVENOUS at 20:12

## 2025-05-02 RX ADMIN — HYDROMORPHONE HYDROCHLORIDE 0.5 MG: 2 INJECTION INTRAMUSCULAR; INTRAVENOUS; SUBCUTANEOUS at 14:08

## 2025-05-02 RX ADMIN — LABETALOL HYDROCHLORIDE 5 MG: 5 INJECTION, SOLUTION INTRAVENOUS at 18:24

## 2025-05-02 ASSESSMENT — PAIN DESCRIPTION - LOCATION
LOCATION: ABDOMEN

## 2025-05-02 ASSESSMENT — LIFESTYLE VARIABLES: SMOKING_STATUS: 0

## 2025-05-02 ASSESSMENT — PAIN DESCRIPTION - DESCRIPTORS
DESCRIPTORS: ACHING

## 2025-05-02 ASSESSMENT — PAIN SCALES - GENERAL
PAINLEVEL_OUTOF10: 4
PAINLEVEL_OUTOF10: 7
PAINLEVEL_OUTOF10: 4
PAINLEVEL_OUTOF10: 8
PAINLEVEL_OUTOF10: 4
PAINLEVEL_OUTOF10: 4
PAINLEVEL_OUTOF10: 7
PAINLEVEL_OUTOF10: 0

## 2025-05-02 ASSESSMENT — PAIN - FUNCTIONAL ASSESSMENT: PAIN_FUNCTIONAL_ASSESSMENT: 0-10

## 2025-05-02 NOTE — PERIOP NOTE
TRANSFER - OUT REPORT:    Verbal report given to Deedee KELLOGG on Azam May Jr.  being transferred to [facility] for routine progression of patient care       Report consisted of patient’s Situation, Background, Assessment and   Recommendations(SBAR).     Information from the following report(s) Nurse Handoff Report, Intake/Output, MAR, and Cardiac Rhythm NSR  was reviewed with the receiving nurse.    Lines:   Peripheral IV 05/02/25 Posterior;Right Hand (Active)   Site Assessment Clean, dry & intact 05/02/25 1849   Line Status Infusing 05/02/25 1849   Line Care Connections checked and tightened 05/02/25 1849   Phlebitis Assessment No symptoms 05/02/25 1849   Infiltration Assessment 0 05/02/25 1606   Alcohol Cap Used No 05/02/25 1255   Dressing Status Clean, dry & intact 05/02/25 1849   Dressing Type Transparent 05/02/25 1849   Dressing Intervention New 05/02/25 1255        Opportunity for questions and clarification was provided.      Patient transported with:   O2 @ 3 liters      Patient and family given floor number and nurses name.  Family updated re: pt status after security code verified.

## 2025-05-02 NOTE — PERIOP NOTE
At this time no floor orders for pt. Jaqueline LAM NP notified via perfect serve that pt needs admission orders. Per Jaqueline admission orders to follow.

## 2025-05-02 NOTE — PERIOP NOTE
Instructed pt on use of incentive spirometer. He wasn't able to move the disc. Pt's lung fields clear to auscultation.. respirations are unlabored.

## 2025-05-02 NOTE — H&P
Ortega Rao MD   General and Robotic surgery  [address], [address]  [place] [address]  Phone [phone]   Fax [phone]      Date of visit: 2025      Primary/Requesting provider: Nathanael Andrade Jr., MD         Name: Azam May Jr.      MRN: [id number]       : 1950       Age: 74 y.o.    Sex: male        PCP: Nathanael Andrade Jr., MD     CC:  No chief complaint on file.      HPI:     Azam May Jr. is a 74 y.o. male with a history of prostatectomy and cholecystectomy who developed incisional hernias above the umbilicus.  He also has a diastases that is essentially circumferentially surrounding the foundation of the defects.  For that reason I think he most likely needs a component separation and obliteration of that space.  This will include mesh as well.      Past Medical History:   Diagnosis Date    Allergic rhinitis     Bilateral impacted cerumen     BMI 30.0-30.9,adult     BMI 30.3    Cancer (HCC)     prostate cancer- surgery    Claustrophobia     mild    Family history of colon cancer     mother and sister    GERD (gastroesophageal reflux disease)     hx of nissen fundoplication and managed with diet    History of Gatica's esophagus     History of stomach ulcers     Hx of colonic polyps 2016    adenomas    Hypertension     no meds    IBS (irritable bowel syndrome)     no current medications    Insomnia     Insomnia     Laryngopharyngeal reflux     Neck mass     Post-nasal drainage     Pure hypercholesterolemia 2018    Urinary incontinence         Past Surgical History:   Procedure Laterality Date    CHOLECYSTECTOMY, OPEN      COLONOSCOPY  last 16    Shasha--asc TA, sigmoid TA, random bx negative--5 year recall    COLONOSCOPY N/A 2022    COLONOSCOPY POLYPECTOMY HOT BIOPSY performed by Chico Gipson MD at [facility] END[facility]COPY    GI      nissen fundoplication surgery due to reflux    HERNIA REPAIR Right     right inguinal

## 2025-05-02 NOTE — PROGRESS NOTES
TRANSFER - IN REPORT:    Verbal report received from BESS Dominguez on Azam May .  being received from PACU for routine progression of patient care      Report consisted of patient's Situation, Background, Assessment and   Recommendations(SBAR).     Information from the following report(s) Nurse Handoff Report was reviewed with the receiving nurse.    Opportunity for questions and clarification was provided.      Assessment completed upon patient's arrival to unit and care assumed.

## 2025-05-02 NOTE — ANESTHESIA PRE PROCEDURE
Department of Anesthesiology  Preprocedure Note       Name:  Azam May Jr.   Age:  74 y.o.  :  1950                                          MRN:  [id number]         Date:  2025      Surgeon: Surgeon(s):  Ortgea Rao MD    Procedure: Procedure(s):  HERNIA INCISIONAL REPAIR LAPAROSCOPIC ROBOTIC WITH MESH/REPAIR DIASTASIS    Medications prior to admission:   Prior to Admission medications    Medication Sig Start Date End Date Taking? Authorizing Provider   aspirin-acetaminophen-caffeine (EXCEDRIN MIGRAINE) 250-250-65 MG per tablet Take 1 tablet by mouth every 6 hours as needed for Headaches   Yes Provider, MD Lilly       Current medications:    Current Facility-Administered Medications   Medication Dose Route Frequency Provider Last Rate Last Admin    lidocaine 1 % injection 1 mL  1 mL IntraDERmal Once PRN Rom Patiño MD        acetaminophen (TYLENOL) tablet 1,000 mg  1,000 mg Oral Once Rom Patiño MD        lactated ringers infusion   IntraVENous Continuous Rom Patiño MD        sodium chloride flush 0.9 % injection 5-40 mL  5-40 mL IntraVENous 2 times per day Rom Patiño MD        sodium chloride flush 0.9 % injection 5-40 mL  5-40 mL IntraVENous PRN Rom Patiño MD        0.9 % sodium chloride infusion   IntraVENous PRN Rom Patiño MD        midazolam PF (VERSED) injection 2 mg  2 mg IntraVENous Once PRN Rom Patiño MD        ceFAZolin (ANCEF) 2000 mg in sterile water 20 mL IV syringe  2,000 mg IntraVENous Once Ortega Rao MD           Allergies:  No Known Allergies    Problem List:    Patient Active Problem List   Diagnosis Code    Essential hypertension I10    Elevated PSA R97.20    GERD (gastroesophageal reflux disease) K21.9    History of colonic polyps Z86.0100    Other specified postprocedural states Z98.890    BMI 29.0-29.9,adult Z68.29    Pure hypercholesterolemia E78.00    Laryngopharyngeal reflux K21.9    
Pt received seated in chair, +IV, +O2 via NC, +HHA at bedside

## 2025-05-02 NOTE — OP NOTE
Operative Note      Patient: Azam May Jr.  YOB: 1950  MRN: [id number]    Date of Procedure: 5/2/2025    Pre-Op Diagnosis Codes:      * Incisional hernia [K43.2]    Post-Op Diagnosis: Same       Procedure(s):  HERNIA INCISIONAL REPAIR LAPAROSCOPIC ROBOTIC WITH MESH    Surgeon(s):  Ortega Rao MD    Assistant:   * No surgical staff found *    Anesthesia: General    Estimated Blood Loss (mL): Minimal    Complications: None    Specimens:   * No specimens in log *    Implants:  Implant Name Type Inv. Item Serial No.  Lot No. LRB No. Used Action   CLIP INT M L POLYMER ALPESH LIG HEM O ALPESH - [id number]  CLIP INT M L POLYMER ALPESH LIG HEM O ALPESH  [employer] [id number] N/A 1 Implanted   MESH WEST 7.6CM POLY 4 HYDROXYBUTYRATE RND FULL RESRB SYN - [id number]  MESH WEST 7.6CM POLY 4 HYDROXYBUTYRATE RND FULL RESRB SYN  BARD DAVOL-WD [id number] N/A 1 Implanted         Drains: * No LDAs found *    Findings:  Infection Present At Time Of Surgery (PATOS) (choose all levels that have infection present):  No infection present  Other Findings:     Detailed Description of Procedure:   The patient was brought to the operating room, placed on the operating table in the supine position.  The patient is intubated endotracheally and placed under general anesthesia.  The abdomen is prepped and draped in a standard sterile fashion.  A 5 mm incision was made at Bhakta's point in the left upper quadrant allowing passage of a Veress needle into the peritoneal cavity and insufflation with CO2 gas to pressure of 10 mmHg.  Following adequate insufflation a 0 degree 5 mm scope was placed with a 5 mm Visiport directly into the peritoneal cavity.  This allows for visualization of the abdomen and placement of a left lateral 8 mm robotic port and a left lower quadrant robotic port.  The robotic system is brought into play, properly docked and instrumentation is placed under direct visualization.    The

## 2025-05-02 NOTE — PERIOP NOTE
Pt remains very drowsy and O2 sat in low 90's. Dr. Lopez at bedside, he recommended pt be admitted overnight. Called Dr. Rao relayed this information, orders received to admit pt overnight  Per MD admission orders to follow..

## 2025-05-03 PROCEDURE — 6360000002 HC RX W HCPCS: Performed by: NURSE PRACTITIONER

## 2025-05-03 PROCEDURE — 2500000003 HC RX 250 WO HCPCS: Performed by: NURSE PRACTITIONER

## 2025-05-03 PROCEDURE — 96376 TX/PRO/DX INJ SAME DRUG ADON: CPT

## 2025-05-03 PROCEDURE — 96374 THER/PROPH/DIAG INJ IV PUSH: CPT

## 2025-05-03 PROCEDURE — 2580000003 HC RX 258: Performed by: NURSE PRACTITIONER

## 2025-05-03 PROCEDURE — G0378 HOSPITAL OBSERVATION PER HR: HCPCS

## 2025-05-03 PROCEDURE — 96375 TX/PRO/DX INJ NEW DRUG ADDON: CPT

## 2025-05-03 PROCEDURE — 6370000000 HC RX 637 (ALT 250 FOR IP): Performed by: NURSE PRACTITIONER

## 2025-05-03 RX ORDER — DIPHENHYDRAMINE HCL 25 MG
25 CAPSULE ORAL ONCE
Status: COMPLETED | OUTPATIENT
Start: 2025-05-03 | End: 2025-05-03

## 2025-05-03 RX ORDER — DIPHENHYDRAMINE HCL 25 MG
25 CAPSULE ORAL EVERY 6 HOURS PRN
Status: DISCONTINUED | OUTPATIENT
Start: 2025-05-03 | End: 2025-05-03

## 2025-05-03 RX ADMIN — FAMOTIDINE 20 MG: 10 INJECTION, SOLUTION INTRAVENOUS at 08:37

## 2025-05-03 RX ADMIN — ONDANSETRON 4 MG: 2 INJECTION, SOLUTION INTRAMUSCULAR; INTRAVENOUS at 02:35

## 2025-05-03 RX ADMIN — DIPHENHYDRAMINE HYDROCHLORIDE 25 MG: 25 CAPSULE ORAL at 20:17

## 2025-05-03 RX ADMIN — FAMOTIDINE 20 MG: 10 INJECTION, SOLUTION INTRAVENOUS at 20:17

## 2025-05-03 RX ADMIN — SODIUM CHLORIDE, PRESERVATIVE FREE 10 ML: 5 INJECTION INTRAVENOUS at 20:17

## 2025-05-03 ASSESSMENT — PAIN SCALES - GENERAL: PAINLEVEL_OUTOF10: 0

## 2025-05-03 NOTE — PLAN OF CARE
74-year-old male status post robotic incisional hernia repair.  The operation went well and was not of a large magnitude.  Apparently the patient had issues maintaining adequate oxygen saturations in PACU and the decision was made to keep him overnight for observation.  I would expect he should be sufficiently ventilating by the morning and can discharge.

## 2025-05-03 NOTE — CARE COORDINATION
CM note:    Chart reviewed for updates. CM met with pt and spouse at bedside, introduced role, confirmed demographics and discussed d/c planning. Pt is presenting with some confusion. Spouse was able to complete assessment with CM. Pt lives with spouse and their son in a 1 level home with 3 steps to enter. He is independent with ADL's at home and he does not use DME at home. He is insured and has a PCP that he sees as needed. Spouse reports that recently he has been seeing his PCP Dr. Andrade often due to some medical issues. Pt is an active  in the community however spouse reports that he does not drive as often. No reported history of IPR/SNF/HH but pt has been to OPPT in the past after a shoulder injury. Pt's spouse and son are his main support system. Plan is for pt to discharge home with family support pending surgery clearance. MOON given. CM will continue to follow up with d/c planning.     05/03/25 8960   Service Assessment   Patient Orientation Alert and Oriented;Person;Place   Cognition Alert   History Provided By Spouse   Primary Caregiver Self   Accompanied By/Relationship Spouse   Support Systems Spouse/Significant Other;Family Members   Patient's Healthcare Decision Maker is: Legal Next of Kin  (Spouse)   PCP Verified by CM Yes   Last Visit to PCP Within last 6 months   Prior Functional Level Independent in ADLs/IADLs   Current Functional Level Independent in ADLs/IADLs   Can patient return to prior living arrangement Yes   Ability to make needs known: Good   Family able to assist with home care needs: Yes   Would you like for me to discuss the discharge plan with any other family members/significant others, and if so, who? Yes  (Spouse)   Financial Resources Medicare;Other (Comment)  ([place] [facility] [place])   Community Resources None   Social/Functional History   Lives With Spouse   Type of Home House   Home Layout One level   Home Access Stairs to enter with rails   Entrance Stairs - Number of Steps 3

## 2025-05-03 NOTE — PROGRESS NOTES
General Surgery Progress Note    5/3/2025    Admit Date: 5/2/2025    Subjective:       Tolerated regular breakfast.  Sleepy, has not ambulated.  Pain minimal.  2L O2 NC    Objective:     /83   Pulse 99   Temp 98.6 °F (37 °C) (Oral)   Resp 16   Ht 1.778 m (5' 10\")   Wt 94.4 kg (208 lb 1.6 oz)   SpO2 98%   BMI 29.86 kg/m²       Intake/Output Summary (Last 24 hours) at 5/3/2025 1001  Last data filed at 5/2/2025 1609  Gross per 24 hour   Intake 1300 ml   Output 10 ml   Net 1290 ml        Physical Exam  Abd soft ,sofía ttp, incisional dressings clean         Data Review   Recent Results (from the past 24 hours)   Hemoglobin    Collection Time: 05/02/25 11:09 AM   Result Value Ref Range    Hemoglobin 14.3 13.6 - 17.2 g/dL        MRI BRAIN WO CONTRAST  Narrative: MRI of the brain    INDICATION: Other symptoms and signs involving the musculoskeletal system;  Unsteadiness on feet; Other symptoms and signs involving cognitive functions and  awareness    TECHNIQUE: Standard MRI sequences were obtained through the brain in multiple  planes without IV contrast    COMPARISON: CT of the head dated 5/5/2024.    FINDINGS:  No restricted diffusion to suggest acute infarct. The region of the sella and  foramen magnum are unremarkable. The marrow signal is within normal limits.  Motion degraded examination. Prominence of the ventricles and sulci, compatible  with brain parenchymal volume loss. Bilateral ocular lens prostheses. Bilateral  frontal and parietal periventricular white matter T2 hyperintensity,  nonspecific, but favored to represent chronic small vessel ischemic changes.  Impression: No acute infarct, intracranial hemorrhage or mass effect.  Prominence of the lateral ventricles, slightly out of proportion relative to the  sulci, which can be seen with normal pressure hydrocephalus. Recommend clinical  correlation.    Electronically signed by NIURKA ANAND           Principal Problem:    Incisional hernia  Active

## 2025-05-03 NOTE — ANESTHESIA POSTPROCEDURE EVALUATION
Department of Anesthesiology  Postprocedure Note    Patient: Azam May Jr.  MRN: [id number]  YOB: 1950  Date of evaluation: 5/3/2025    Procedure Summary       Date: 05/02/25 Room / Location: [facility] / [facility]    Anesthesia Start: 1319 Anesthesia Stop: 1610    Procedure: HERNIA INCISIONAL REPAIR LAPAROSCOPIC ROBOTIC WITH MESH (Abdomen) Diagnosis:       Incisional hernia      (Incisional hernia [K43.2])    Surgeons: Ortega Rao MD Responsible Provider: Riley Lopez MD    Anesthesia Type: General ASA Status: 2            Anesthesia Type: General    Stacey Phase I: Stacey Score: 8    Stacey Phase II:      Anesthesia Post Evaluation    Patient location during evaluation: PACU  Patient participation: complete - patient participated  Level of consciousness: awake  Airway patency: patent  Nausea & Vomiting: no nausea and no vomiting  Cardiovascular status: blood pressure returned to baseline  Respiratory status: acceptable  Hydration status: euvolemic  Comments: The patient is not weaning well from oxygen.  He is having abdominal pain and hypoventilating.  However, with sats of 92% on 4L, it is difficult to give him more narcotic pain medication with the risk of further desaturation.  PO tylenol given and small amount of dilaudid, which did seem to improve his breathing somewhat.  I do not foresee him getting off of oxygen.  Dr. Rao was contacted to ask about 23 hrs obs for this patient who supposed to be going home postop.  Pain management: adequate    No notable events documented.

## 2025-05-03 NOTE — PROGRESS NOTES
4 Eyes Skin Assessment     NAME:  Azam May Jr.  YOB: 1950  MEDICAL RECORD NUMBER:  [id number]    The patient is being assessed for  Admission    I agree that at least one RN has performed a thorough Head to Toe Skin Assessment on the patient. ALL assessment sites listed below have been assessed.      Areas assessed by both nurses:    Head, Face, Ears, Shoulders, Back, Chest, Arms, Elbows, Hands, Sacrum. Buttock, Coccyx, Ischium, Legs. Feet and Heels, and Under Medical Devices         Does the Patient have a Wound? No noted wound(s)       Samm Prevention initiated by RN: No  Wound Care Orders initiated by RN: No    Pressure Injury (Stage 3,4, Unstageable, DTI, NWPT, and Complex wounds) if present, place Wound referral order by RN under : No    New Ostomies, if present place, Ostomy referral order under : No     Nurse 1 eSignature: Electronically signed by MELINDA FINLEY RN on 5/3/25 at 1:05 AM EDT    **SHARE this note so that the co-signing nurse can place an eSignature**    Nurse 2 eSignature: Electronically signed by Flakita Wiggins RN on 5/3/25 at 4:05 AM EDT

## 2025-05-03 NOTE — ACP (ADVANCE CARE PLANNING)
Advance Care Planning   General Advance Care Planning (ACP) Conversation    Date of Conversation: 3/13/2025  Conducted with: Patient with Decision Making Capacity  Other persons present: None    Healthcare Decision Maker:  Lianna May - Spouse - [phone]- Next of Kin        Cecily Teran

## 2025-05-04 VITALS
TEMPERATURE: 98.1 F | WEIGHT: 208.1 LBS | DIASTOLIC BLOOD PRESSURE: 70 MMHG | SYSTOLIC BLOOD PRESSURE: 115 MMHG | HEIGHT: 70 IN | HEART RATE: 67 BPM | RESPIRATION RATE: 17 BRPM | BODY MASS INDEX: 29.79 KG/M2 | OXYGEN SATURATION: 93 %

## 2025-05-04 PROCEDURE — G0378 HOSPITAL OBSERVATION PER HR: HCPCS

## 2025-05-04 RX ORDER — FAMOTIDINE 20 MG/1
20 TABLET, FILM COATED ORAL 2 TIMES DAILY
Status: DISCONTINUED | OUTPATIENT
Start: 2025-05-04 | End: 2025-05-04 | Stop reason: HOSPADM

## 2025-05-04 NOTE — PLAN OF CARE
Problem: Pain  Goal: Verbalizes/displays adequate comfort level or baseline comfort level  5/3/2025 2136 by Lisseth Vivar, RN  Outcome: Progressing  5/3/2025 0846 by Dorie Go, RN  Outcome: Progressing     Problem: Safety - Adult  Goal: Free from fall injury  Outcome: Progressing     Problem: Discharge Planning  Goal: Discharge to home or other facility with appropriate resources  Outcome: Progressing     Problem: ABCDS Injury Assessment  Goal: Absence of physical injury  Outcome: Progressing

## 2025-05-04 NOTE — PROGRESS NOTES
Patient pulled out IV.   IVs attempted by 2 Rns unsuccessfully.     Messaged General Surgery to see if patient's Pepcid should be changed to P.O since this is his second IV that he has pulled out. No response.

## 2025-05-07 ENCOUNTER — TELEPHONE (OUTPATIENT)
Dept: FAMILY MEDICINE CLINIC | Facility: CLINIC | Age: 75
End: 2025-05-07

## 2025-05-15 ENCOUNTER — OFFICE VISIT (OUTPATIENT)
Dept: SURGERY | Age: 75
End: 2025-05-15

## 2025-05-15 VITALS — WEIGHT: 208 LBS | BODY MASS INDEX: 29.78 KG/M2 | HEIGHT: 70 IN

## 2025-05-15 DIAGNOSIS — K43.2 INCISIONAL HERNIA OF ANTERIOR ABDOMINAL WALL WITHOUT OBSTRUCTION OR GANGRENE: Primary | ICD-10-CM

## 2025-05-15 NOTE — PROGRESS NOTES
Ortega Rao MD   General and Robotic surgery  [address], [address]  [place] [address]  Phone [phone]   Fax [phone]      Date of visit: 5/15/2025      Primary/Requesting provider: Nathanael Andrade Jr., MD         Name: Azam May Jr.      MRN: [id number]       : 1950       Age: 74 y.o.    Sex: male        PCP: Nathanael Andrade Jr., MD     CC:    Chief Complaint   Patient presents with    Follow-up     FOLLOW UP-25-INCISIONAL HERNIA REPAIR([facility])       HPI:     Azam May Jr. is a 74 y.o. male status post robotic incisional hernia repair with mesh; comes in today actually doing very well.  Although he is having some other issues with ambulation due to his joints, he is able to ambulate.  He is also eating with no issues and has normal bowel function.  The wounds have no evidence of infection or dehiscence and we have removed his staples today.  Overall doing well.      Past Medical History:   Diagnosis Date    Allergic rhinitis     Bilateral impacted cerumen     BMI 30.0-30.9,adult     BMI 30.3    Cancer (HCC)     prostate cancer- surgery    Claustrophobia     mild    Family history of colon cancer     mother and sister    GERD (gastroesophageal reflux disease)     hx of nissen fundoplication and managed with diet    History of Gatica's esophagus     History of stomach ulcers     Hx of colonic polyps 2016    adenomas    Hypertension     no meds    IBS (irritable bowel syndrome)     no current medications    Insomnia     Insomnia     Laryngopharyngeal reflux     Neck mass     Post-nasal drainage     Pure hypercholesterolemia 2018    Urinary incontinence         Past Surgical History:   Procedure Laterality Date    CHOLECYSTECTOMY, OPEN      COLONOSCOPY  last 16    Shasha--asc TA, sigmoid TA, random bx negative--5 year recall    COLONOSCOPY N/A 2022    COLONOSCOPY POLYPECTOMY HOT BIOPSY performed by [place] Gipson [place] at [facility] [place]

## 2025-05-20 ENCOUNTER — OFFICE VISIT (OUTPATIENT)
Dept: FAMILY MEDICINE CLINIC | Facility: CLINIC | Age: 75
End: 2025-05-20
Payer: MEDICARE

## 2025-05-20 ENCOUNTER — RESULTS FOLLOW-UP (OUTPATIENT)
Dept: FAMILY MEDICINE CLINIC | Facility: CLINIC | Age: 75
End: 2025-05-20

## 2025-05-20 VITALS
RESPIRATION RATE: 18 BRPM | TEMPERATURE: 98 F | SYSTOLIC BLOOD PRESSURE: 120 MMHG | OXYGEN SATURATION: 98 % | DIASTOLIC BLOOD PRESSURE: 80 MMHG | HEIGHT: 70 IN | HEART RATE: 51 BPM | WEIGHT: 203 LBS | BODY MASS INDEX: 29.06 KG/M2

## 2025-05-20 DIAGNOSIS — R26.81 UNSTEADY GAIT: ICD-10-CM

## 2025-05-20 DIAGNOSIS — I10 ESSENTIAL HYPERTENSION: Primary | ICD-10-CM

## 2025-05-20 DIAGNOSIS — C61 PROSTATE CANCER (HCC): ICD-10-CM

## 2025-05-20 DIAGNOSIS — E78.00 PURE HYPERCHOLESTEROLEMIA: ICD-10-CM

## 2025-05-20 DIAGNOSIS — R29.898 LEG WEAKNESS, BILATERAL: ICD-10-CM

## 2025-05-20 LAB
ALBUMIN SERPL-MCNC: 4.1 G/DL (ref 3.2–4.6)
ALBUMIN/GLOB SERPL: 1.7 (ref 1–1.9)
ALP SERPL-CCNC: 70 U/L (ref 40–129)
ALT SERPL-CCNC: 27 U/L (ref 8–55)
ANION GAP SERPL CALC-SCNC: 12 MMOL/L (ref 7–16)
AST SERPL-CCNC: 20 U/L (ref 15–37)
BASOPHILS # BLD: 0.05 K/UL (ref 0–0.2)
BASOPHILS NFR BLD: 0.9 % (ref 0–2)
BILIRUB SERPL-MCNC: 1.3 MG/DL (ref 0–1.2)
BUN SERPL-MCNC: 22 MG/DL (ref 8–23)
CALCIUM SERPL-MCNC: 9.5 MG/DL (ref 8.8–10.2)
CHLORIDE SERPL-SCNC: 107 MMOL/L (ref 98–107)
CHOLEST SERPL-MCNC: 179 MG/DL (ref 0–200)
CO2 SERPL-SCNC: 24 MMOL/L (ref 20–29)
CREAT SERPL-MCNC: 1.63 MG/DL (ref 0.8–1.3)
DIFFERENTIAL METHOD BLD: ABNORMAL
EOSINOPHIL # BLD: 0.15 K/UL (ref 0–0.8)
EOSINOPHIL NFR BLD: 2.6 % (ref 0.5–7.8)
ERYTHROCYTE [DISTWIDTH] IN BLOOD BY AUTOMATED COUNT: 14.1 % (ref 11.9–14.6)
GLOBULIN SER CALC-MCNC: 2.4 G/DL (ref 2.3–3.5)
GLUCOSE SERPL-MCNC: 85 MG/DL (ref 70–99)
HCT VFR BLD AUTO: 47.2 % (ref 41.1–50.3)
HDLC SERPL-MCNC: 39 MG/DL (ref 40–60)
HDLC SERPL: 4.6 (ref 0–5)
HGB BLD-MCNC: 15.8 G/DL (ref 13.6–17.2)
IMM GRANULOCYTES # BLD AUTO: 0.08 K/UL (ref 0–0.5)
IMM GRANULOCYTES NFR BLD AUTO: 1.4 % (ref 0–5)
LDLC SERPL CALC-MCNC: 116 MG/DL (ref 0–100)
LYMPHOCYTES # BLD: 0.71 K/UL (ref 0.5–4.6)
LYMPHOCYTES NFR BLD: 12.5 % (ref 13–44)
MCH RBC QN AUTO: 32.4 PG (ref 26.1–32.9)
MCHC RBC AUTO-ENTMCNC: 33.5 G/DL (ref 31.4–35)
MCV RBC AUTO: 96.7 FL (ref 82–102)
MONOCYTES # BLD: 0.45 K/UL (ref 0.1–1.3)
MONOCYTES NFR BLD: 7.9 % (ref 4–12)
NEUTS SEG # BLD: 4.23 K/UL (ref 1.7–8.2)
NEUTS SEG NFR BLD: 74.7 % (ref 43–78)
NRBC # BLD: 0 K/UL (ref 0–0.2)
PLATELET # BLD AUTO: 149 K/UL (ref 150–450)
PMV BLD AUTO: 11.6 FL (ref 9.4–12.3)
POTASSIUM SERPL-SCNC: 4.4 MMOL/L (ref 3.5–5.1)
PROT SERPL-MCNC: 6.5 G/DL (ref 6.3–8.2)
RBC # BLD AUTO: 4.88 M/UL (ref 4.23–5.6)
SODIUM SERPL-SCNC: 143 MMOL/L (ref 136–145)
TRIGL SERPL-MCNC: 119 MG/DL (ref 0–150)
VLDLC SERPL CALC-MCNC: 24 MG/DL (ref 6–23)
WBC # BLD AUTO: 5.7 K/UL (ref 4.3–11.1)

## 2025-05-20 PROCEDURE — G8417 CALC BMI ABV UP PARAM F/U: HCPCS | Performed by: FAMILY MEDICINE

## 2025-05-20 PROCEDURE — 1123F ACP DISCUSS/DSCN MKR DOCD: CPT | Performed by: FAMILY MEDICINE

## 2025-05-20 PROCEDURE — G8427 DOCREV CUR MEDS BY ELIG CLIN: HCPCS | Performed by: FAMILY MEDICINE

## 2025-05-20 PROCEDURE — 3074F SYST BP LT 130 MM HG: CPT | Performed by: FAMILY MEDICINE

## 2025-05-20 PROCEDURE — 1036F TOBACCO NON-USER: CPT | Performed by: FAMILY MEDICINE

## 2025-05-20 PROCEDURE — G2211 COMPLEX E/M VISIT ADD ON: HCPCS | Performed by: FAMILY MEDICINE

## 2025-05-20 PROCEDURE — 99214 OFFICE O/P EST MOD 30 MIN: CPT | Performed by: FAMILY MEDICINE

## 2025-05-20 PROCEDURE — 3079F DIAST BP 80-89 MM HG: CPT | Performed by: FAMILY MEDICINE

## 2025-05-20 PROCEDURE — 1159F MED LIST DOCD IN RCRD: CPT | Performed by: FAMILY MEDICINE

## 2025-05-20 PROCEDURE — 3017F COLORECTAL CA SCREEN DOC REV: CPT | Performed by: FAMILY MEDICINE

## 2025-05-20 PROCEDURE — 1160F RVW MEDS BY RX/DR IN RCRD: CPT | Performed by: FAMILY MEDICINE

## 2025-05-20 ASSESSMENT — ENCOUNTER SYMPTOMS
VOMITING: 0
NAUSEA: 0
DIARRHEA: 0
COUGH: 0
SHORTNESS OF BREATH: 0

## 2025-05-20 ASSESSMENT — PATIENT HEALTH QUESTIONNAIRE - PHQ9
SUM OF ALL RESPONSES TO PHQ QUESTIONS 1-9: 0
SUM OF ALL RESPONSES TO PHQ QUESTIONS 1-9: 0
1. LITTLE INTEREST OR PLEASURE IN DOING THINGS: NOT AT ALL
SUM OF ALL RESPONSES TO PHQ QUESTIONS 1-9: 0
SUM OF ALL RESPONSES TO PHQ QUESTIONS 1-9: 0
2. FEELING DOWN, DEPRESSED OR HOPELESS: NOT AT ALL

## 2025-05-20 NOTE — PROGRESS NOTES
Azam May Jr. (:  1950) is a 74 y.o. male,Established patient, here for evaluation of the following chief complaint(s):  Other (F/u after hernia repair. /Still has not seen neuro for NPH. ), Hypertension (Well-controlled, check labs), and Cholesterol Problem (Repeat labs)         Assessment & Plan  Essential hypertension   Chronic, at goal (stable), continue current treatment plan    Orders:  •  CBC with Auto Differential; Future  •  Comprehensive Metabolic Panel; Future    Pure hypercholesterolemia   Chronic, at goal (stable), continue current treatment plan    Orders:  •  Comprehensive Metabolic Panel; Future  •  Lipid Panel; Future    Prostate cancer (HCC)   Monitored by specialist- no acute findings meriting change in the plan         Leg weakness, bilateral   Chronic, not at goal (unstable), need to get LP for eval of pressure for possible NPH ASAP- if + will refer to neurosurgery, if negative, will need fu with Neurology    Orders:  •  SPINAL PUNCTURE,LUMBAR,DIAGNOSTIC; Future    Unsteady gait   Chronic, not at goal (unstable), as above    Orders:  •  SPINAL PUNCTURE,LUMBAR,DIAGNOSTIC; Future    FU here as needed and 6 months       Subjective   Hypertension  This is a chronic problem. The current episode started more than 1 year ago. The problem is unchanged. The problem is controlled. Pertinent negatives include no chest pain or shortness of breath. Risk factors for coronary artery disease include dyslipidemia, male gender and sedentary lifestyle. Past treatments include nothing. The current treatment provides moderate improvement. There are no compliance problems.    Hyperlipidemia  This is a chronic problem. The current episode started more than 1 year ago. The problem is controlled. Recent lipid tests were reviewed and are variable. Pertinent negatives include no chest pain or shortness of breath.   Extremity Weakness  This is a chronic problem. The current episode started more than 1

## 2025-06-05 ENCOUNTER — TELEPHONE (OUTPATIENT)
Dept: FAMILY MEDICINE CLINIC | Facility: CLINIC | Age: 75
End: 2025-06-05

## 2025-06-05 DIAGNOSIS — R26.81 UNSTEADY GAIT WHEN WALKING: Primary | ICD-10-CM

## 2025-06-05 DIAGNOSIS — R41.89 COGNITIVE DECLINE: ICD-10-CM

## 2025-06-17 ENCOUNTER — OFFICE VISIT (OUTPATIENT)
Dept: NEUROSURGERY | Age: 75
End: 2025-06-17
Payer: MEDICARE

## 2025-06-17 VITALS
OXYGEN SATURATION: 96 % | DIASTOLIC BLOOD PRESSURE: 79 MMHG | TEMPERATURE: 97.3 F | SYSTOLIC BLOOD PRESSURE: 129 MMHG | HEART RATE: 69 BPM | WEIGHT: 203 LBS | BODY MASS INDEX: 29.06 KG/M2 | HEIGHT: 70 IN

## 2025-06-17 DIAGNOSIS — G91.2 NPH (NORMAL PRESSURE HYDROCEPHALUS) (HCC): Primary | ICD-10-CM

## 2025-06-17 PROCEDURE — 1126F AMNT PAIN NOTED NONE PRSNT: CPT | Performed by: NURSE PRACTITIONER

## 2025-06-17 PROCEDURE — 3078F DIAST BP <80 MM HG: CPT | Performed by: NURSE PRACTITIONER

## 2025-06-17 PROCEDURE — G8417 CALC BMI ABV UP PARAM F/U: HCPCS | Performed by: NURSE PRACTITIONER

## 2025-06-17 PROCEDURE — 3074F SYST BP LT 130 MM HG: CPT | Performed by: NURSE PRACTITIONER

## 2025-06-17 PROCEDURE — 1036F TOBACCO NON-USER: CPT | Performed by: NURSE PRACTITIONER

## 2025-06-17 PROCEDURE — 1123F ACP DISCUSS/DSCN MKR DOCD: CPT | Performed by: NURSE PRACTITIONER

## 2025-06-17 PROCEDURE — 99203 OFFICE O/P NEW LOW 30 MIN: CPT | Performed by: NURSE PRACTITIONER

## 2025-06-17 PROCEDURE — 3017F COLORECTAL CA SCREEN DOC REV: CPT | Performed by: NURSE PRACTITIONER

## 2025-06-17 PROCEDURE — G8427 DOCREV CUR MEDS BY ELIG CLIN: HCPCS | Performed by: NURSE PRACTITIONER

## 2025-06-17 PROCEDURE — 1160F RVW MEDS BY RX/DR IN RCRD: CPT | Performed by: NURSE PRACTITIONER

## 2025-06-17 PROCEDURE — 1159F MED LIST DOCD IN RCRD: CPT | Performed by: NURSE PRACTITIONER

## 2025-06-17 NOTE — PROGRESS NOTES
[facility] NOTE:   History of Present Illness:    CC: NPH evaluation    Azam May Jr. is a 75 y.o. male here to discuss having a lumbar drain trial.  Patient notes has been seeing his primary care doctor been having some odd symptoms occur over the past 6 or 7 months.  Patient notes that the most bothersome is his gait.  He definitely has a shuffling and awkward gait.  Patient states he is unsteady feels as though he is going to fall.  He has also had some memory issues start to develop.  Upon discussion the patient is unaware of the memory issues with the patient's spouse is much more acutely aware.  Patient also notes he has urinary incontinence but this is not something new for him as he has a history of prostate cancer and after all of the treatment regarding that he has had difficulty with urinary incontinence.  Patient had an MRI brain that did reveal very large ventricles.  We discussed with the patient the process of the lumbar drain trial the risks and benefits associated with this procedure.  Is decided the patient would benefit from this trial and we will proceed.  Patient denies taking any blood thinners or having any medical issues.    Past Medical History:   Diagnosis Date    Allergic rhinitis     Bilateral impacted cerumen     BMI 30.0-30.9,adult     BMI 30.3    Cancer (HCC)     prostate cancer- surgery    Claustrophobia     mild    Family history of colon cancer     mother and sister    GERD (gastroesophageal reflux disease)     hx of nissen fundoplication and managed with diet    History of Gatica's esophagus     History of stomach ulcers     Hx of colonic polyps 2016    adenomas    Hypertension     no meds    IBS (irritable bowel syndrome)     no current medications    Insomnia     Insomnia     Laryngopharyngeal reflux     Neck mass     Post-nasal drainage     Pure hypercholesterolemia 2/16/2018    Urinary incontinence       Past Surgical History:

## 2025-06-25 DIAGNOSIS — G91.2 NPH (NORMAL PRESSURE HYDROCEPHALUS) (HCC): Primary | ICD-10-CM

## 2025-07-14 ENCOUNTER — HOSPITAL ENCOUNTER (INPATIENT)
Dept: INTERVENTIONAL RADIOLOGY/VASCULAR | Age: 75
LOS: 11 days | Discharge: NURSING, REHAB OR LONG-TERM CARE | End: 2025-07-25
Attending: NEUROLOGICAL SURGERY | Admitting: NEUROLOGICAL SURGERY
Payer: MEDICARE

## 2025-07-14 DIAGNOSIS — Z51.89 ENCOUNTER FOR OTHER SPECIFIED AFTERCARE: Primary | ICD-10-CM

## 2025-07-14 DIAGNOSIS — G91.2 NPH (NORMAL PRESSURE HYDROCEPHALUS) (HCC): ICD-10-CM

## 2025-07-14 PROCEDURE — 2709999900 IR LUMBAR PUNCTURE FOR DIAGNOSIS

## 2025-07-14 PROCEDURE — 62328 DX LMBR SPI PNXR W/FLUOR/CT: CPT | Performed by: RADIOLOGY

## 2025-07-14 PROCEDURE — 6360000002 HC RX W HCPCS: Performed by: RADIOLOGY

## 2025-07-14 PROCEDURE — 92523 SPEECH SOUND LANG COMPREHEN: CPT

## 2025-07-14 PROCEDURE — 97116 GAIT TRAINING THERAPY: CPT

## 2025-07-14 PROCEDURE — 009U3ZX DRAINAGE OF SPINAL CANAL, PERCUTANEOUS APPROACH, DIAGNOSTIC: ICD-10-PCS | Performed by: RADIOLOGY

## 2025-07-14 PROCEDURE — 97530 THERAPEUTIC ACTIVITIES: CPT

## 2025-07-14 PROCEDURE — 1100000000 HC RM PRIVATE

## 2025-07-14 PROCEDURE — 76937 US GUIDE VASCULAR ACCESS: CPT

## 2025-07-14 PROCEDURE — 97165 OT EVAL LOW COMPLEX 30 MIN: CPT

## 2025-07-14 PROCEDURE — 36415 COLL VENOUS BLD VENIPUNCTURE: CPT

## 2025-07-14 PROCEDURE — 97161 PT EVAL LOW COMPLEX 20 MIN: CPT

## 2025-07-14 RX ORDER — ACETAMINOPHEN 325 MG/1
650 TABLET ORAL EVERY 6 HOURS PRN
Status: DISCONTINUED | OUTPATIENT
Start: 2025-07-14 | End: 2025-07-19

## 2025-07-14 RX ORDER — LIDOCAINE HYDROCHLORIDE 10 MG/ML
INJECTION, SOLUTION EPIDURAL; INFILTRATION; INTRACAUDAL; PERINEURAL PRN
Status: DISCONTINUED | OUTPATIENT
Start: 2025-07-14 | End: 2025-07-14

## 2025-07-14 RX ORDER — ACETAMINOPHEN 325 MG/1
650 TABLET ORAL EVERY 6 HOURS PRN
Status: ON HOLD | COMMUNITY

## 2025-07-14 RX ORDER — SODIUM CHLORIDE 0.9 % (FLUSH) 0.9 %
5-40 SYRINGE (ML) INJECTION EVERY 12 HOURS SCHEDULED
Status: DISCONTINUED | OUTPATIENT
Start: 2025-07-14 | End: 2025-07-25 | Stop reason: HOSPADM

## 2025-07-14 RX ORDER — SODIUM CHLORIDE 0.9 % (FLUSH) 0.9 %
5-40 SYRINGE (ML) INJECTION PRN
Status: DISCONTINUED | OUTPATIENT
Start: 2025-07-14 | End: 2025-07-25 | Stop reason: HOSPADM

## 2025-07-14 RX ORDER — ONDANSETRON 2 MG/ML
4 INJECTION INTRAMUSCULAR; INTRAVENOUS EVERY 6 HOURS PRN
Status: DISCONTINUED | OUTPATIENT
Start: 2025-07-14 | End: 2025-07-25 | Stop reason: HOSPADM

## 2025-07-14 RX ORDER — SODIUM CHLORIDE 9 MG/ML
INJECTION, SOLUTION INTRAVENOUS PRN
Status: DISCONTINUED | OUTPATIENT
Start: 2025-07-14 | End: 2025-07-25 | Stop reason: HOSPADM

## 2025-07-14 RX ADMIN — LIDOCAINE HYDROCHLORIDE 5 ML: 10 INJECTION, SOLUTION EPIDURAL; INFILTRATION; INTRACAUDAL; PERINEURAL at 10:05

## 2025-07-14 ASSESSMENT — PAIN DESCRIPTION - ORIENTATION: ORIENTATION: LOWER

## 2025-07-14 ASSESSMENT — PAIN - FUNCTIONAL ASSESSMENT: PAIN_FUNCTIONAL_ASSESSMENT: PREVENTS OR INTERFERES SOME ACTIVE ACTIVITIES AND ADLS

## 2025-07-14 ASSESSMENT — PAIN DESCRIPTION - LOCATION: LOCATION: BACK

## 2025-07-14 ASSESSMENT — PAIN SCALES - GENERAL: PAINLEVEL_OUTOF10: 6

## 2025-07-14 ASSESSMENT — PAIN DESCRIPTION - PAIN TYPE: TYPE: CHRONIC PAIN

## 2025-07-14 ASSESSMENT — PAIN DESCRIPTION - FREQUENCY: FREQUENCY: CONTINUOUS

## 2025-07-14 ASSESSMENT — PAIN DESCRIPTION - DESCRIPTORS: DESCRIPTORS: ACHING

## 2025-07-14 NOTE — PROGRESS NOTES
ACUTE OCCUPATIONAL THERAPY GOALS:   (Developed with and agreed upon by patient and/or caregiver.)  Patient will complete lower body dressing with modified independence.   Patient will complete functional transfers with modified independence.   Patient will complete functional mobility of household distances with modified independence.   Patient will complete dual task with no verbal cues or additional time.   Timeframe: 7 visits     OCCUPATIONAL THERAPY Initial Assessment and Daily Note       OT Visit Days: 1  Acknowledge Orders  Time  OT Charge Capture  Rehab Caseload Tracker      Azam May Jr. is a 75 y.o. male   PRIMARY DIAGNOSIS: <principal problem not specified>  NPH (normal pressure hydrocephalus) (HCC) [G91.2]    * Day of Surgery *  Reason for Referral: Other lack of cordination (R27.8)  Outpatient: Payor: MEDICARE / Plan: MEDICARE PART A AND B / Product Type: *No Product type* /     ASSESSMENT:     REHAB RECOMMENDATIONS:   Recommendation to date pending progress:  Setting:  To be determined pending progress     Equipment:    To Be Determined     ASSESSMENT:  Mr. May is a 76 y/o M admitted for lumbar drain trial with Dr. Brown. Patient has 6 month history of shuffling gait, memory issues, & urinary incontinence.  Seen today for pre-procedure initial assessment.    Lives with spouse & adult son who assist with medications & finances, but otherwise patient is fairly independent with ADLs & IADLs. Reports one recent fall, but admits to \"furniture walking.\" He has a cane & a walker but does not use them. He admits to being fairly sedentary spending most of his day watching TV. He still drives, but rarely. He is R hand dominant and admits to difficulty completing tasks such as throwing a ball.     Today, presents with multiple physical & cognitive deficits affecting his independence and safety including impaired balance, memory loss, decreased processing speed, impaired coordination, and decreased

## 2025-07-14 NOTE — PROGRESS NOTES
Physical Therapy Note:    Attempted to see patient for re-evaluation s/p lumbar drain placement. Discussed with primary RN. No CSF has been drained. Will re-evaluate tomorrow s/p draining. Updated patient on plan of care at bedside. Dr. Brown notified via [employer].     Thank you,    Stacy Damian, PT, DPT

## 2025-07-14 NOTE — PROGRESS NOTES
ACUTE PHYSICAL THERAPY GOALS:   (Developed with and agreed upon by patient and/or caregiver.)  1. Patient will transfers bed to chair with INDEPENDENCE within 7 days.  2. Patient will demonstrate FAIR+ DYNAMIC STANDING balance within 7 day(s).  3. Patient will ambulate 150ft+ with (MODIFIED) INDEPENDENCE within 7 days.  4. Patient will tolerate 25+ minutes of therapeutic activity/exercise and/or neuromuscular re-education while maintaining stable vitals to improve functional strength and activity tolerance within 7 days.  5. Patient will demonstrate a clinically significant improvement in TUG time within 7 days.  6. Patient will demonstrate a clinically significant improvement in Cognitive TUG time within 7 days.    PHYSICAL THERAPY Initial Assessment, Daily Note, and AM  (Link to Caseload Tracking: PT Visit Days : 1  Acknowledge Orders  Time In/Out  PT Charge Capture  Rehab Caseload Tracker    Azam May Jr. is a 75 y.o. male   PRIMARY DIAGNOSIS: <principal problem not specified>  NPH (normal pressure hydrocephalus) ([facility]) [G91.2]    * Day of Surgery *  Reason for Referral: Difficulty in walking, Not elsewhere classified (R26.2)  History of falling (Z91.81)  Dizziness and Giddiness (R42)  Outpatient: Payor: MEDICARE / Plan: MEDICARE PART A AND B / Product Type: *No Product type* /     ASSESSMENT:     REHAB RECOMMENDATIONS:   Recommendation to date pending progress:  Setting:  Outpatient Therapy    Equipment:    To Be Determined     ASSESSMENT:  Mr. May is a pleasant 75 year old male admitted for lumbar drain trial as part of normal pressure hydrocephalus (NPH) work up. Patient is seen this AM for initial PT evaluation prior to lumbar drain placement. At baseline, patient is an independent, household-level ambulator without utilizing an assistive device. Reports difficulty with community-level ambulation tolerance, activity tolerance, and balance/gait status x6-7 months. Today, Mr. May presents    Supine to Sit [] [] [] [] [] [x] [] [] [] [] [] Rigid trunk throughout   Scooting [] [] [] [x] [] [] [] [] [] [] []    Sit to Supine [] [] [] [x] [] [] [] [] [] [] []    Transfers    Sit to Stand [] [] [] [] [x] [] [] [] [] [] [] Multimodal cues for task initiation    Bed to Chair [] [] [] [] [x] [] [] [] [] [] []    Stand to Sit [] [] [] [] [x] [] [] [] [] [] []     [] [] [] [] [] [] [] [] [] [] []    I=Independent, Mod I=Modified Independent, S=Supervision, SBA=Standby Assistance, CGA=Contact Guard Assistance,   Min=Minimal Assistance, Mod=Moderate Assistance, Max=Maximal Assistance, Total=Total Assistance, NT=Not Tested    GAIT: I Mod I S SBA CGA Min Mod Max Total  NT x2 Comments:   Level of Assistance [] [] [] [] [x] [] [] [] [] [] []    Distance 30ft + 20ft x4 for TUG Trails feet    DME Gait Belt    Gait Quality Altered arm swing, Decreased tegan , Decreased step clearance, Decreased step length, Path deviations , and Trunk sway increased    Weightbearing Status Restrictions/Precautions  Restrictions/Precautions: Fall Risk    Stairs      I=Independent, Mod I=Modified Independent, S=Supervision, SBA=Standby Assistance, CGA=Contact Guard Assistance,   Min=Minimal Assistance, Mod=Moderate Assistance, Max=Maximal Assistance, Total=Total Assistance, NT=Not Tested    PLAN:   FREQUENCY AND DURATION: Daily for duration of hospital stay or until stated goals are met, whichever comes first.    THERAPY PROGNOSIS: Good    PROBLEM LIST:   (Skilled intervention is medically necessary to address:)  Decreased Activity Tolerance  Decreased Balance  Decreased Coordination  Decreased Gait Ability  Decreased Strength INTERVENTIONS PLANNED:   (Benefits and precautions of physical therapy have been discussed with the patient.)  Therapeutic Activity  Therapeutic Exercise/HEP  Neuromuscular Re-education  Gait Training  Education       TREATMENT:   EVALUATION: LOW COMPLEXITY: (Untimed Charge)  The initial evaluation charge

## 2025-07-14 NOTE — PROGRESS NOTES
4 Eyes Skin Assessment     NAME:  Azam May Jr.  YOB: 1950  MEDICAL RECORD NUMBER:  [id number]    The patient is being assessed for  Admission    I agree that at least one RN has performed a thorough Head to Toe Skin Assessment on the patient. ALL assessment sites listed below have been assessed.      Areas assessed by both nurses:    Sacrum. Buttock, Coccyx, Ischium        Does the Patient have a Wound? No noted wound(s)       Samm Prevention initiated by RN: Yes  Wound Care Orders initiated by RN: No    Pressure Injury (Stage 1,2,3,4, Unstageable, DTI, NWPT, and Complex wounds) if present, place Wound referral order by RN under : No    New Ostomies, if present place, Ostomy referral order under : No     Nurse 1 eSignature: Electronically signed by Mckenna Boyd RN on 7/14/25 at 7:18 PM EDT    **SHARE this note so that the co-signing nurse can place an eSignature**    Nurse 2 eSignature: {Esignature:[id number]}

## 2025-07-14 NOTE — CARE COORDINATION
Case Management Assessment  Initial Evaluation    Date/Time of Evaluation: 7/14/2025 4:46 PM  Assessment Completed by: TANA BULL    If patient is discharged prior to next notation, then this note serves as note for discharge by case management.    Patient Name: Azam May Jr.                   YOB: 1950  Diagnosis:   NPH (normal pressure hydrocephalus) (HCC) [G91.2]                   Date / Time: 7/14/2025  3:53 PM    Patient Admission Status: Inpatient   Readmission Risk (Low < 19, Mod (19-27), High > 27): Readmission Risk Score: 4.8        Current PCP: Nathanael Andrade Jr., MD  PCP verified by CM? (P) Yes    Chart Reviewed: Yes      History Provided by: (P) Patient, Medical Record  Patient Orientation: (P) Alert and Oriented    Patient Cognition: (P) Alert    Hospitalization in the last 30 days (Readmission):  No    If yes, Readmission Assessment in CM Navigator will be completed.    Advance Directives:      Code Status: Full Code   Patient's Primary Decision Maker is: (P) Legal Next of Kin    Primary Decision Maker: Lianna May - Spouse - [phone]    Discharge Planning:    Patient lives with: (P) Spouse/Significant Other, Children Type of Home: (P) House  Primary Care Giver: (P) Self  Patient Support Systems include: (P) Children, Spouse/Significant Other   Current Financial resources: (P) Medicare, Other (Comment) (Medicare and [facility])  Current community resources: (P) None  Current services prior to admission: (P) Durable Medical Equipment            Current DME: (P) Walker            Type of Home Care services:       ADLS  Prior functional level: (P) Independent in ADLs/IADLs  Current functional level: (P) Assistance with the following:, Other (see comment) (TBD by therapy)    PT AM-PAC: 18 /24  OT AM-PAC: 21 /24    Family can provide assistance at DC: (P) Yes  Would you like Case Management to discuss the discharge plan with any other family

## 2025-07-14 NOTE — CONSULTS
[facility] Hospitalist Consult   Admit Date:  2025  3:53 PM   Name:  Azam May Jr.   Age:  75 y.o.  Sex:  male  :  1950   MRN:  [id number]   Room:  [address] [place] [address]    Presenting/Chief Complaint: No chief complaint on file.    Reason(s) for Admission: NPH (normal pressure hydrocephalus) (HCC) [G91.2]     Hospitalists consulted by Gustavo Brown,* for: medical management    History of Presenting Illness:   Azam May Jr. is a 75 y.o. male who was admitted by Dr. Brown on  for a lumbar drain trail for his suspected normal pressure hydrocephalus.     We are asked to manage chronic medical conditions    Hx of prostate cancer currently under surveillance, GERD with nissen fundoplication, CKD 3, HTN diet controlled. He is on no medications.   Assessment & Plan:     Active Problems:    NPH - per primary    He is on no home medications. No concerns.Labs from May of this year only significant for mild thrombocytopenia and elevated LDL. He can follow up with this with his PCP. He has no concerns today and would rather not be on any home medications.     I will order repeat labs in AM. But nothing acute the hospitalist group needs to follow on. Will sign off.       Anticipated Discharge Arrangements:   Defer to primary team    PT/OT evals ordered?  Defer to primary team  Diet:  ADULT DIET; Regular  VTE prophylaxis: Defer to primary team  Code status: Full Code    Non-peripheral Lines and Tubes (if present):      Open Drain 25 Inferior;Midline Back (Active)        Telemetry (if present):           Hospital Problems:  Active Problems:    * No active hospital problems. *  Resolved Problems:    * No resolved hospital problems. *        Objective:   Patient Vitals for the past 24 hrs:   Temp Pulse Resp BP SpO2   25 1445 -- -- -- (!) 155/76 91 %   25 1215 -- -- -- -- 97 %   25 1100 -- (!) 49 14 126/74 --   25 0830 97.6 °F (36.4 °C) 52 16 138/79 98 %       Oxygen

## 2025-07-14 NOTE — ACP (ADVANCE CARE PLANNING)
Advance Care Planning     Advance Care Planning Activator (Inpatient)  Conversation Note      Health Care Decision Maker:  No LW/HCPOA on file, patient aware legal next of kin remain decision maker until document provided.      Current Designated Health Care Decision Maker:     Primary Decision Maker: LaloLianna - Spouse - [phone]    Secondary Decision Maker: Shawn May - Child    Secondary Decision Maker: Tila May - Child    Secondary Decision Maker: Azam May - Child      Care Preferences Full Code per MD order

## 2025-07-14 NOTE — H&P
HPI:   Azam May Jr.75 y.o. male with a PMH of prostate cancer who presents for lumbar drain trial.  Patient has had 6 or 7 months of worsening memory problems, abnormal gait and urinary incontinence.  He has been noted to have a shuffling or awkward gait.  Memory problems have been more significant per the spouse rather than reported by patient.  He had an MRI of his brain which showed enlarged ventricles, he was sent to neurosurgery for workup of normal pressure hydrocephalus.    Past Medical History:   Diagnosis Date    Allergic rhinitis     Bilateral impacted cerumen     BMI 30.0-30.9,adult     BMI 30.3    Cancer (HCC)     prostate cancer- surgery    Claustrophobia     mild    Family history of colon cancer     mother and sister    GERD (gastroesophageal reflux disease)     hx of nissen fundoplication and managed with diet    History of Gatica's esophagus     History of stomach ulcers     Hx of colonic polyps 2016    adenomas    Hypertension     no meds    IBS (irritable bowel syndrome)     no current medications    Insomnia     Insomnia     Laryngopharyngeal reflux     Neck mass     Post-nasal drainage     Pure hypercholesterolemia 2/16/2018    Urinary incontinence      Past Surgical History:   Procedure Laterality Date    CHOLECYSTECTOMY, OPEN  1989    COLONOSCOPY  last 2/2/16    Shasha--asc TA, sigmoid TA, random bx negative--5 year recall    COLONOSCOPY N/A 12/07/2022    COLONOSCOPY POLYPECTOMY HOT BIOPSY performed by Chico Gipson MD at [facility]    GI  1992    nissen fundoplication surgery due to reflux    HERNIA REPAIR Right     right inguinal    OTHER SURGICAL HISTORY      prostate biopsy    OTHER SURGICAL HISTORY  04/04/2013    S/P Excision of right submandibular gland with FS    PROSTATECTOMY  2019    ROTATOR CUFF REPAIR Right 2008    x 2    TONSILLECTOMY      VENTRAL HERNIA REPAIR N/A 5/2/2025    HERNIA INCISIONAL REPAIR LAPAROSCOPIC ROBOTIC WITH MESH performed by Ortega Rao

## 2025-07-14 NOTE — PROGRESS NOTES
TRANSFER - IN REPORT:    [facility]  [facility] PACU for ordered procedure      Report consisted of patient's Situation, Background, Assessment and   Recommendations(SBAR).     Information from the following report(s) Nurse Handoff Report was reviewed with the receiving nurse.    Opportunity for questions and clarification was provided.      Assessment completed upon patient's arrival to unit and care assumed.

## 2025-07-14 NOTE — PROGRESS NOTES
GOALS:  LTG: Patient will improve neuro-linguistic abilities to increase safety and awareness in functional living environment.   STG:  Patient will complete reasoning tasks to improve problem solving and safety awareness with 80% accuracy given minimal cueing.  Patient will recall relevant verbal information with 80% accuracy with minimal cues.  Patient will participate in ongoing cognitive-linguistic assessment as per NPH protocol.     SPEECH LANGUAGE PATHOLOGY: COGNITIVE COMMUNICATION Initial Assessment    Acknowledge Order  I  Therapy Time  I   Charges     I  Rehab Caseload Tracker    NAME: Azam May Jr.  : 1950  MRN: [id number]    ADMISSION DATE: 2025  PRIMARY DIAGNOSIS: <principal problem not specified>    ICD-10: Treatment Diagnosis:  R41.841 Cognitive-Communication Deficit    RECOMMENDATIONS:   Recommendations:   Continued speech therapy to target cognitive-linguistic deficits   Therapeutic Interventions: Patient/family education  Cognitive-linguistic treatment   Patient continues to require skilled intervention: Yes. Recommend ongoing speech therapy services during this hospitalization.   Anticipated Discharge Needs: Ongoing speech therapy is recommended at next level of care. Though recommendations may change as NPH trials continue.      ASSESSMENT   Patient presents with mild-moderate cognitive-linguistic deficits impacting orientation, short term recall, and executive functions. Patient unable to report date/year/city. When presented with 5 words had difficulty with immediate and delayed recall. Difficulty with tasks targeting executive functions secondary to poor recall of instructions. Relative strengths noted in sustained attention and problem solving skills requiring simple math/digit manipulation.      Pre-Drain   Score (X/30) 16     GENERAL   Subjective:  RN     Reason for Consult: NPH trials     History of Present Injury/Illness: Mr. May  has a past medical history of  requested time.   Namin/3   - 1 point for identifying rhinoceros and hippopotamus   Memory: 0/5  -5 points for inability tor recall any of the presented words after a delay  Difficulty also observed with immediate recall (unscored)  Attention: 6/6  Language 0/3  - 2 points, unable to recall and repeat back sentences x 2  - 1 point. When given a minute patient able to generate 2 words that begin with letter F.   Abstraction /  Orientation: 3/6  - 3 points for incorrect date, year, and city       MODIFIED ROXIE SCALE (mRS) SCORE:   Score: 3 Description   [] 0  No Symptoms   [] 1 No significant disability despite symptoms; Able to carry out all usual duties and activities.   [] 2 Slight Disability: Unable to carry out all previous activities, but able to look after own affairs without assistance.    [x] 3 Moderate Disability Requires some help, but is able to walk without assistance.   [] 4 Moderately Severe Disability Unable to walk without assistance and unable to attend to own bodily needs without assistance.    [] 5 Severe disability Bedridden, incontinent, and requiring constant nursing care and attention.      PLAN    Duration/Frequency: Continue to follow patient 5x/week for duration of hospitalization and/or until goals met    Rehabilitation Potential For Stated Goals: Good    Interdisciplinary Collaboration: RN/ PCT, PT/ PTA, and OT/ PALACIOS    Medical Necessity    Skilled intervention continues to be required due to decreased cognitive skills.    Education:   Patient and/or family/caregiver educated on Results of evaluation, Role of speech therapy, Diet recommendations, SLP recommendations, and SLP plan  Education response: Verbalizes understanding    Safety:   Call light within reach  In Bed  Family/visitors at bedside    Therapy Time:  Time In: 854  Time Out: 911  Minutes: 17    CARLA MOTT  2025 9:44 AM

## 2025-07-14 NOTE — PROGRESS NOTES
Attempted OT session post drain placement. Patient remains in IR and therefore has not had any CSF drained yet. Will hold OT and follow up tomorrow once patient has moved to floor & had CSF drained.   Kay Camacho, OT

## 2025-07-14 NOTE — PROGRESS NOTES
SPEECH LANGUAGE PATHOLOGY: ATTEMPT     NAME: Azam May Jr.  : 1950  MRN: [id number]    ADMISSION DATE: 2025  PRIMARY DIAGNOSIS: <principal problem not specified>    Attempted speech therapy session post drain placement. No CSF has been drained, will hold until tomorrow. Thank you!    DESIRE MARTINEZ, SLP  2025 3:53 PM

## 2025-07-15 LAB
ALBUMIN SERPL-MCNC: 3.7 G/DL (ref 3.2–4.6)
ALBUMIN/GLOB SERPL: 1.4 (ref 1–1.9)
ALP SERPL-CCNC: 57 U/L (ref 40–129)
ALT SERPL-CCNC: 20 U/L (ref 8–55)
ANION GAP SERPL CALC-SCNC: 10 MMOL/L (ref 7–16)
AST SERPL-CCNC: 23 U/L (ref 15–37)
BASOPHILS # BLD: 0.04 K/UL (ref 0–0.2)
BASOPHILS NFR BLD: 0.7 % (ref 0–2)
BILIRUB SERPL-MCNC: 2.2 MG/DL (ref 0–1.2)
BUN SERPL-MCNC: 19 MG/DL (ref 8–23)
CALCIUM SERPL-MCNC: 9.3 MG/DL (ref 8.8–10.2)
CHLORIDE SERPL-SCNC: 107 MMOL/L (ref 98–107)
CO2 SERPL-SCNC: 23 MMOL/L (ref 20–29)
CREAT SERPL-MCNC: 1.54 MG/DL (ref 0.8–1.3)
DIFFERENTIAL METHOD BLD: ABNORMAL
EOSINOPHIL # BLD: 0.1 K/UL (ref 0–0.8)
EOSINOPHIL NFR BLD: 1.8 % (ref 0.5–7.8)
ERYTHROCYTE [DISTWIDTH] IN BLOOD BY AUTOMATED COUNT: 14.2 % (ref 11.9–14.6)
GLOBULIN SER CALC-MCNC: 2.7 G/DL (ref 2.3–3.5)
GLUCOSE SERPL-MCNC: 90 MG/DL (ref 70–99)
HCT VFR BLD AUTO: 44.9 % (ref 41.1–50.3)
HGB BLD-MCNC: 14.8 G/DL (ref 13.6–17.2)
IMM GRANULOCYTES # BLD AUTO: 0.06 K/UL (ref 0–0.5)
IMM GRANULOCYTES NFR BLD AUTO: 1.1 % (ref 0–5)
LYMPHOCYTES # BLD: 0.69 K/UL (ref 0.5–4.6)
LYMPHOCYTES NFR BLD: 12.7 % (ref 13–44)
MCH RBC QN AUTO: 32 PG (ref 26.1–32.9)
MCHC RBC AUTO-ENTMCNC: 33 G/DL (ref 31.4–35)
MCV RBC AUTO: 97 FL (ref 82–102)
MONOCYTES # BLD: 0.43 K/UL (ref 0.1–1.3)
MONOCYTES NFR BLD: 7.9 % (ref 4–12)
NEUTS SEG # BLD: 4.11 K/UL (ref 1.7–8.2)
NEUTS SEG NFR BLD: 75.8 % (ref 43–78)
NRBC # BLD: 0 K/UL (ref 0–0.2)
PLATELET # BLD AUTO: 122 K/UL (ref 150–450)
PMV BLD AUTO: 11.1 FL (ref 9.4–12.3)
POTASSIUM SERPL-SCNC: 4.4 MMOL/L (ref 3.5–5.1)
PROT SERPL-MCNC: 6.4 G/DL (ref 6.3–8.2)
RBC # BLD AUTO: 4.63 M/UL (ref 4.23–5.6)
SODIUM SERPL-SCNC: 141 MMOL/L (ref 136–145)
WBC # BLD AUTO: 5.4 K/UL (ref 4.3–11.1)

## 2025-07-15 PROCEDURE — 99222 1ST HOSP IP/OBS MODERATE 55: CPT | Performed by: PSYCHIATRY & NEUROLOGY

## 2025-07-15 PROCEDURE — 97112 NEUROMUSCULAR REEDUCATION: CPT

## 2025-07-15 PROCEDURE — 97116 GAIT TRAINING THERAPY: CPT

## 2025-07-15 PROCEDURE — 85025 COMPLETE CBC W/AUTO DIFF WBC: CPT

## 2025-07-15 PROCEDURE — 2500000003 HC RX 250 WO HCPCS

## 2025-07-15 PROCEDURE — 1100000000 HC RM PRIVATE

## 2025-07-15 PROCEDURE — 6370000000 HC RX 637 (ALT 250 FOR IP): Performed by: NEUROLOGICAL SURGERY

## 2025-07-15 PROCEDURE — 97130 THER IVNTJ EA ADDL 15 MIN: CPT

## 2025-07-15 PROCEDURE — 80053 COMPREHEN METABOLIC PANEL: CPT

## 2025-07-15 PROCEDURE — 36415 COLL VENOUS BLD VENIPUNCTURE: CPT

## 2025-07-15 PROCEDURE — 97129 THER IVNTJ 1ST 15 MIN: CPT

## 2025-07-15 RX ADMIN — SODIUM CHLORIDE, PRESERVATIVE FREE 10 ML: 5 INJECTION INTRAVENOUS at 20:45

## 2025-07-15 RX ADMIN — Medication 12 MG: at 20:45

## 2025-07-15 RX ADMIN — SODIUM CHLORIDE, PRESERVATIVE FREE 10 ML: 5 INJECTION INTRAVENOUS at 11:59

## 2025-07-15 ASSESSMENT — PAIN SCALES - GENERAL: PAINLEVEL_OUTOF10: 0

## 2025-07-15 NOTE — PROGRESS NOTES
ACUTE PHYSICAL THERAPY GOALS:   (Developed with and agreed upon by patient and/or caregiver.)  1. Patient will transfers bed to chair with INDEPENDENCE within 7 days.  2. Patient will demonstrate FAIR+ DYNAMIC STANDING balance within 7 day(s).  3. Patient will ambulate 150ft+ with (MODIFIED) INDEPENDENCE within 7 days.  4. Patient will tolerate 25+ minutes of therapeutic activity/exercise and/or neuromuscular re-education while maintaining stable vitals to improve functional strength and activity tolerance within 7 days.  5. Patient will demonstrate a clinically significant improvement in TUG time within 7 days.  6. Patient will demonstrate a clinically significant improvement in Cognitive TUG time within 7 days.    PHYSICAL THERAPY Initial Assessment, Daily Note, and AM  (Link to Caseload Tracking: PT Visit Days : 1  Acknowledge Orders  Time In/Out  PT Charge Capture  Rehab Caseload Tracker    Azam May Jr. is a 75 y.o. male   PRIMARY DIAGNOSIS: Essential hypertension  NPH (normal pressure hydrocephalus) (HCC) [G91.2]    1 Day Post-Op  Reason for Referral: Difficulty in walking, Not elsewhere classified (R26.2)  History of falling (Z91.81)  Dizziness and Giddiness (R42)  Inpatient: Payor: MEDICARE / Plan: MEDICARE PART A AND B / Product Type: *No Product type* /     ASSESSMENT:     REHAB RECOMMENDATIONS:   Recommendation to date pending progress:  Setting:  Outpatient Therapy    Equipment:    To Be Determined     ASSESSMENT:  Mr. May is a pleasant 75 year old male admitted for lumbar drain trial as part of normal pressure hydrocephalus (NPH) work up. At baseline, patient is an independent, household-level ambulator without utilizing an assistive device. Reports difficulty with community-level ambulation tolerance, activity tolerance, and balance/gait status x6-7 months.     7/15- Patient is seen this AM for PT evaluation/treatment s/p 205cc of CSF drained. Timed Up and Go (TUG) time of 26.87

## 2025-07-15 NOTE — PROGRESS NOTES
GOALS:  LTG: Patient will improve neuro-linguistic abilities to increase safety and awareness in functional living environment.   STG: PROGRESSING 7/15/25  Patient will complete reasoning tasks to improve problem solving and safety awareness with 80% accuracy given minimal cueing.  Patient will recall relevant verbal information with 80% accuracy with minimal cues.  Patient will participate in ongoing cognitive-linguistic assessment as per NPH protocol.     SPEECH LANGUAGE PATHOLOGY: COGNITIVE COMMUNICATION Daily Note #1    Acknowledge Order  I  Therapy Time  I   Charges     I  Rehab Caseload Tracker    NAME: Azam May Jr.  : 1950  MRN: [id number]    ADMISSION DATE: 2025  PRIMARY DIAGNOSIS: Essential hypertension    ICD-10: Treatment Diagnosis:  R41.841 Cognitive-Communication Deficit    RECOMMENDATIONS:   Recommendations:   Continued speech therapy to target cognitive-linguistic deficits   Therapeutic Interventions: Patient/family education  Cognitive-linguistic treatment   Patient continues to require skilled intervention: Yes. Recommend ongoing speech therapy services during this hospitalization.   Anticipated Discharge Needs: Ongoing speech therapy is recommended at next level of care. Though recommendations may change as NPH trials continue.      ASSESSMENT   Patient continues with moderate cognitive-linguistic deficits. Considerable frustration noted with cognitive tasks this date, aware of deficits though does not demonstrate functional insight for how to compensate during assessment. This date increased difficulty with sustained attention observed, requiring redirection. Continues to have trouble with tasks requiring recall and executive functioning skills. Able to recall 2/5 words immediately, 0/5 after short delay. Reported it was  and unable to state we were in the hospital. Discussed progress thus far with Penny DEJESUS.      Pre-Drain () Post-Drain (July 15)   Score (X/30)

## 2025-07-15 NOTE — PROGRESS NOTES
Neurosurgery Progress Note      Subjective    Mr. May is hospital day 1, hospitalized for lumbar drain trial.  He did become confused overnight and attempted to pull the drain.  Nursing staff has had no difficulty with the drain since that time.  He does not feel that he is doing any differently than the day before.      Objective    Physical exam:  General: No acute distress  Awake, alert  Eyes open spontaneously   PERRL, EOMI, visual fields grossly intact to light and confrontation   Hearing grossly intact   Face symmetric and tongue mid-line on protrusion   No pronation or drift on exam   Sensation intact to light touch and pin-prick         Assessment & Plan    - Continue lumbar drain trial    On 07/15/25 I have spent 15 minutes reviewing previous notes, test results and face to face with the patient ( and any present family or support) discussing the diagnosis and plan. I have all answered questions to their satisfaction.     This note was created using voice recognition software.  All attempts were made to recognize and correct voice recognition errors. Unfortunately some errors may persist.  Penny Low, APRN - CNP

## 2025-07-15 NOTE — PROGRESS NOTES
IP Consult to Vascular Access Team  Consult performed by: Tobin Gabriel RN  Consult ordered by: Gustavo Brown MD       US Guided PIV access-    Ultrasound was used to find the vein which was compressible and without any ultrasound features of an artery or nerve bundle. Skin was cleaned and disinfected prior to IV puncture.  Under real-time ultrasound guidance peripheral access was obtained in the right upper arm using 22 G 1.75\" Peripheral IV catheter after 1 attempt(s). Blood return was present and IV flushed without difficulty with no clinical signs of infiltration. IV dressing applied and no immediate complications noted. Patient tolerated the procedure well.

## 2025-07-15 NOTE — PROGRESS NOTES
Pt attempting to ambulate to bathroom w/o assistance, disconnecting the lumbar drain.  Notified Dr. Brown.  Stephanie advised to lay a sterile towel down, cut the catheter off above the leak, chloraprep the nipple and catheter, place the catheter back on the nipple, and use sutures to reinforce catheter tip.  He then advised to place a tegaderm over the catheter and use gauze to keep the catheter off the skin.  Facetimed Stephanie to ensure the drain was connected back properly.

## 2025-07-15 NOTE — CONSULTS
Neurology Consult Note       History: This is a 75-year-old man who is hospitalized for a lumbar drain with consideration of normal pressure hydrocephalus.  Recent MRI showed ventriculomegaly.  The patient is a poor historian and denies memory loss.  He states that he has difficulty with ambulation but is tangential and recounting the tempo of his symptoms.  He does endorse urinary incontinence      Exam: Pertinent positives and negatives include:    Comprehension is intact.  Communication is tangential.  Occasional paraphasic errors.  Cranial nerve examination is normal.  Motor examination is normal.  Sensory examination is normal.  Gait and stance not assessed due to bed alarm and lack of assistance.    I reviewed the patient's MRI of the brain.  Ventriculomegaly.  There is no evidence index and no callosal angle    Assessment and Plan: 75-year-old man who was admitted for a lumbar drain with consideration of normal pressure hydrocephalus.  My suspicion for NPH is low to moderate but we will see if he improves with a lumbar drain.  I recommend checking for AD with  (phosphorylated tau protein) testing.  No further recommendations.  Neurology will sign off.  I will have the patient follow-up in clinic regarding testing for AD.        Cumulative time spent today was 55 minutes which included chart review, obtaining history (from patient, family, or other providers), review of images, examining the patient, and counseling the patient and/or family on medical condition.

## 2025-07-15 NOTE — PROGRESS NOTES
Nutrition Assessment  Assessment Type: Initial, Positive nutrition screen  Reason for visit:  Best Practice Alert: Malnutrition Screening Tool   Malnutrition Screening Tool Score: 3  Unintentional weight loss PTA: 24 to 33 pounds (3 points)  Eating poorly due to decreased appetite: No (0 points)    Nutrition Intervention:   Food and/or Nutrient Delivery:   Meals and Snacks:  Diet: Continue current order  Medical Food Supplements:   Medical food supplement therapy:  None Not indicated  Coordination of Nutrition Care:  Coordination with health care provider Mckenna KELLOGG     Malnutrition Assessment:  Academy/A.S.P.E.N Clinical Malnutrition Criteria  Malnutrition Status: Insufficient data  Nutrition Focused Physical Exam: visually no deficits observed    Nutrition Assessment:  Food/Nutrition Related History:   Unable to obtain from pt, no family present.       Do You Have Any Cultural, Roman Catholic, or Ethnic Food Preferences?: No   Weight History:   Nursing states pt reported 30# wt loss on admission screen.   Wt hx per EMR review:   FM office  203# 5/20/25, 210# 2/28/25, 208# 2/20/25, 209#  1/21/25, 209# 5/1/23.    Neurosurgery office:   203# 6/17/25.    Fluctuating wt pattern noted at FM office, unable to validate 30# wt loss reported to nursing on screen.    Nutrition Background:       PMH remarkable for prostate cancer, GERD s/p nissen fundoplication, HTN, CKD3.    Admitted for lumbar drain trial for suspected normal pressure hydrocephalus.    Nutrition Monitoring/Evaluation:  SLP following for cognitive-linguistics deficits.   Pt with considerable frustrations on SLP eval today.      Pt sleeping at RD encounter, only briefly awakens.  Lunch tray at bedside ~75%   consumed, custom meal order noted including foods and beverages served.  Discussed with nursing who deferred waking patient at this time given ongoing frustrations, difficulties with functional insight.  Pt is self feeding, nursing reports intake at other

## 2025-07-15 NOTE — PROGRESS NOTES
ACUTE OCCUPATIONAL THERAPY GOALS:   (Developed with and agreed upon by patient and/or caregiver.)  Patient will complete lower body dressing with modified independence.   Patient will complete functional transfers with modified independence.   Patient will complete functional mobility of household distances with modified independence.   Patient will complete dual task with no verbal cues or additional time.   Timeframe: 7 visits     OCCUPATIONAL THERAPY Daily Note       OT Visit Days: 2  Acknowledge Orders  Time  OT Charge Capture  Rehab Caseload Tracker      Azam May Jr. is a 75 y.o. male   PRIMARY DIAGNOSIS: Essential hypertension  NPH (normal pressure hydrocephalus) ([facility]) [G91.2]    1 Day Post-Op  Reason for Referral: Other lack of cordination (R27.8)  Inpatient: Payor: MEDICARE / Plan: MEDICARE PART A AND B / Product Type: *No Product type* /     ASSESSMENT:     REHAB RECOMMENDATIONS:   Recommendation to date pending progress:  Setting:  To be determined pending progress     Equipment:    To Be Determined     ASSESSMENT:  Mr. May is a 76 y/o M admitted for lumbar drain trial with Dr. Brown. Patient has 6 month history of shuffling gait, memory issues, & urinary incontinence. Lives with spouse & adult son who assist with medications & finances, but otherwise patient is fairly independent with ADLs & IADLs. Reports one recent fall, but admits to \"furniture walking.\" He has a cane & a walker but does not use them. He admits to being fairly sedentary spending most of his day watching TV. He still drives, but rarely. He is R hand dominant and admits to difficulty completing tasks such as throwing a ball.     7/15 205 ml drained: Patient continues to have deficits in balance and cognition including working memory, attention, and orientation. No significant improvement noted compared to session yesterday. Patient was able to recall 0/3 words from beginning to end of session (although he did attempt to

## 2025-07-16 PROCEDURE — 97116 GAIT TRAINING THERAPY: CPT

## 2025-07-16 PROCEDURE — 97130 THER IVNTJ EA ADDL 15 MIN: CPT

## 2025-07-16 PROCEDURE — 6370000000 HC RX 637 (ALT 250 FOR IP): Performed by: NEUROLOGICAL SURGERY

## 2025-07-16 PROCEDURE — 97535 SELF CARE MNGMENT TRAINING: CPT

## 2025-07-16 PROCEDURE — 99222 1ST HOSP IP/OBS MODERATE 55: CPT

## 2025-07-16 PROCEDURE — 97129 THER IVNTJ 1ST 15 MIN: CPT

## 2025-07-16 PROCEDURE — 97112 NEUROMUSCULAR REEDUCATION: CPT

## 2025-07-16 PROCEDURE — 36415 COLL VENOUS BLD VENIPUNCTURE: CPT

## 2025-07-16 PROCEDURE — 83520 IMMUNOASSAY QUANT NOS NONAB: CPT

## 2025-07-16 PROCEDURE — 2500000003 HC RX 250 WO HCPCS

## 2025-07-16 PROCEDURE — 1100000000 HC RM PRIVATE

## 2025-07-16 RX ADMIN — Medication 12 MG: at 20:37

## 2025-07-16 RX ADMIN — SODIUM CHLORIDE, PRESERVATIVE FREE 5 ML: 5 INJECTION INTRAVENOUS at 20:38

## 2025-07-16 ASSESSMENT — PAIN SCALES - GENERAL: PAINLEVEL_OUTOF10: 0

## 2025-07-16 NOTE — PROGRESS NOTES
GOALS:  LTG: Patient will improve neuro-linguistic abilities to increase safety and awareness in functional living environment.   STG: PROGRESSING 25  Patient will complete reasoning tasks to improve problem solving and safety awareness with 80% accuracy given minimal cueing.  Patient will recall relevant verbal information with 80% accuracy with minimal cues.  Patient will participate in ongoing cognitive-linguistic assessment as per NPH protocol.     SPEECH LANGUAGE PATHOLOGY: COGNITIVE COMMUNICATION Daily Note #2    Acknowledge Order  I  Therapy Time  I   Charges     I  Rehab Caseload Tracker    NAME: Azam May Jr.  : 1950  MRN: [id number]    ADMISSION DATE: 2025  PRIMARY DIAGNOSIS: Essential hypertension    ICD-10: Treatment Diagnosis:  R41.841 Cognitive-Communication Deficit    RECOMMENDATIONS:   Recommendations:   Continued speech therapy to target cognitive-linguistic deficits   Therapeutic Interventions: Patient/family education  Cognitive-linguistic treatment   Patient continues to require skilled intervention: Yes. Recommend ongoing speech therapy services during this hospitalization.   Anticipated Discharge Needs: Ongoing speech therapy is recommended at next level of care. Though recommendations may change as NPH trials continue.      ASSESSMENT   Patient continues with moderate cognitive-linguistic deficits. Marginal score improvement on [place] Cognitive Assessment today, administration was improved with less redirection required. Qualitative improvements noted that did not impact score. Patient with only one error on trail making task (alternate sequencing of letters and numbers 1-5 an A-E) which is substantially improved from previous attempts. Improvement noted in orientation, patient able to report year and location appropriately for the first time. Perhaps marginal improvement noted today qualitatively ? Will continue to follow for further assessment.      Pre-Drain  Goals: Good    Interdisciplinary Collaboration: RN/ PCT, PT/ PTA, and OT/ PALACIOS    Medical Necessity    Skilled intervention continues to be required due to decreased cognitive skills.    Education:   Patient and/or family/caregiver educated on Results of evaluation, Role of speech therapy, Diet recommendations, SLP recommendations, and SLP plan  Education response: Verbalizes understanding    Safety:   Call light within reach  In Bed  Physical and occupational therapy entering room     Therapy Time:  Time In: 0912  Time Out: 0944  Minutes: 32    CARLA MOTT  7/16/2025 10:09 AM

## 2025-07-16 NOTE — PROGRESS NOTES
ACUTE PHYSICAL THERAPY GOALS:   (Developed with and agreed upon by patient and/or caregiver.)  1. Patient will transfers bed to chair with INDEPENDENCE within 7 days.  2. Patient will demonstrate FAIR+ DYNAMIC STANDING balance within 7 day(s).  3. Patient will ambulate 150ft+ with (MODIFIED) INDEPENDENCE within 7 days.  4. Patient will tolerate 25+ minutes of therapeutic activity/exercise and/or neuromuscular re-education while maintaining stable vitals to improve functional strength and activity tolerance within 7 days.  5. Patient will demonstrate a clinically significant improvement in TUG time within 7 days.  6. Patient will demonstrate a clinically significant improvement in Cognitive TUG time within 7 days.    PHYSICAL THERAPY Daily Note and AM  (Link to Caseload Tracking: PT Visit Days : 3  Acknowledge Orders  Time In/Out  PT Charge Capture  Rehab Caseload Tracker    Azam May Jr. is a 75 y.o. male   PRIMARY DIAGNOSIS: Essential hypertension  NPH (normal pressure hydrocephalus) ([facility]) [G91.2]    2 Days Post-Op  Reason for Referral: Difficulty in walking, Not elsewhere classified (R26.2)  History of falling (Z91.81)  Dizziness and Giddiness (R42)  Inpatient: Payor: MEDICARE / Plan: MEDICARE PART A AND B / Product Type: *No Product type* /     ASSESSMENT:     REHAB RECOMMENDATIONS:   Recommendation to date pending progress:  Setting:  Outpatient Therapy    Equipment:    To Be Determined     ASSESSMENT:  Mr. May is a pleasant 75 year old male admitted for lumbar drain trial as part of normal pressure hydrocephalus (NPH) work up. At baseline, patient is an independent, household-level ambulator without utilizing an assistive device. Reports difficulty with community-level ambulation tolerance, activity tolerance, and balance/gait status x6-7 months.     7/16- Patient is seen this AM for PT evaluation/treatment s/p 415cc of CSF drained. Timed Up and Go (TUG) time of 23.88 seconds and Cognitive    Scooting [] [] [] [x] [] [] [] [] [] [] []    Sit to Supine [] [] [] [] [] [] [] [] [] [x] []    Transfers    Sit to Stand [] [] [] [] [x] [] [] [] [] [] [] Multimodal cues for task initiation    Bed to Chair [] [] [] [] [x] [] [] [] [] [] [] Multimodal cues for task initiation    Stand to Sit [] [] [] [] [x] [] [] [] [] [] [] Multimodal cues for task initiation     [] [] [] [] [] [] [] [] [] [] []    I=Independent, Mod I=Modified Independent, S=Supervision, SBA=Standby Assistance, CGA=Contact Guard Assistance,   Min=Minimal Assistance, Mod=Moderate Assistance, Max=Maximal Assistance, Total=Total Assistance, NT=Not Tested    GAIT: I Mod I S SBA CGA Min Mod Max Total  NT x2 Comments:   Level of Assistance [] [] [] [] [x] [] [] [] [] [] []    Distance 4x20ft for TUG and 50ft within room/bathroom feet    DME Gait Belt    Gait Quality Altered arm swing, Decreased tegan , Decreased step clearance, Decreased step length, Path deviations , and Trunk sway increased    Weightbearing Status Restrictions/Precautions  Restrictions/Precautions: Fall Risk    Stairs      I=Independent, Mod I=Modified Independent, S=Supervision, SBA=Standby Assistance, CGA=Contact Guard Assistance,   Min=Minimal Assistance, Mod=Moderate Assistance, Max=Maximal Assistance, Total=Total Assistance, NT=Not Tested    PLAN:   FREQUENCY AND DURATION: Daily for duration of hospital stay or until stated goals are met, whichever comes first.    THERAPY PROGNOSIS: Good    PROBLEM LIST:   (Skilled intervention is medically necessary to address:)  Decreased Activity Tolerance  Decreased Balance  Decreased Coordination  Decreased Gait Ability  Decreased Strength INTERVENTIONS PLANNED:   (Benefits and precautions of physical therapy have been discussed with the patient.)  Therapeutic Activity  Therapeutic Exercise/HEP  Neuromuscular Re-education  Gait Training  Education       TREATMENT:   TREATMENT:   Gait Training (40 Minutes): Gait training for 4x20ft for

## 2025-07-16 NOTE — CARE COORDINATION
RN CM in room, patient sitting up in chair, alert and oriented. Home needs remain OP therapy at this time, with DC date TBD. Patient denies any needs at this time and RN CM will continue to follow for DC needs

## 2025-07-16 NOTE — PROGRESS NOTES
Neurosurgery Progress Note      Subjective    Mr. May is hospital day 2, hospitalized for lumbar drain trial.He does not feel any differently today. Therapy evals are worsen than baseline      Objective    Physical exam:  General: No acute distress  Awake, alert  Eyes open spontaneously   PERRL, EOMI, visual fields grossly intact to light and confrontation   Hearing grossly intact   Face symmetric and tongue mid-line on protrusion   No pronation or drift on exam   Sensation intact to light touch and pin-prick         Assessment & Plan    - Continue lumbar drain trial  - Will obtain  from CSF and blood    On 07/16/25 I have spent 15 minutes reviewing previous notes, test results and face to face with the patient ( and any present family or support) discussing the diagnosis and plan. I have all answered questions to their satisfaction.     This note was created using voice recognition software.  All attempts were made to recognize and correct voice recognition errors. Unfortunately some errors may persist.  Penny Low, APRN - CNP

## 2025-07-16 NOTE — PROGRESS NOTES
ACUTE OCCUPATIONAL THERAPY GOALS:   (Developed with and agreed upon by patient and/or caregiver.)  Patient will complete lower body dressing with modified independence.   Patient will complete functional transfers with modified independence.   Patient will complete functional mobility of household distances with modified independence.   Patient will complete dual task with no verbal cues or additional time.   Timeframe: 7 visits     OCCUPATIONAL THERAPY Daily Note       OT Visit Days: 3  Acknowledge Orders  Time  OT Charge Capture  Rehab Caseload Tracker      Azam May Jr. is a 75 y.o. male   PRIMARY DIAGNOSIS: Essential hypertension  NPH (normal pressure hydrocephalus) ([facility]) [G91.2]    2 Days Post-Op  Reason for Referral: Other lack of cordination (R27.8)  Inpatient: Payor: MEDICARE / Plan: MEDICARE PART A AND B / Product Type: *No Product type* /     ASSESSMENT:     REHAB RECOMMENDATIONS:   Recommendation to date pending progress:  Setting:  To be determined pending progress     Equipment:    To Be Determined     ASSESSMENT:  Mr. May is a 74 y/o M admitted for lumbar drain trial with Dr. Brown. Patient has 6 month history of shuffling gait, memory issues, & urinary incontinence. Lives with spouse & adult son who assist with medications & finances, but otherwise patient is fairly independent with ADLs & IADLs. Reports one recent fall, but admits to \"furniture walking.\" He has a cane & a walker but does not use them. He admits to being fairly sedentary spending most of his day watching TV. He still drives, but rarely. He is R hand dominant and admits to difficulty completing tasks such as throwing a ball.     7/16 415 ml drained: Patient appears frustrated and discouraged about his current state. He continues to have deficits in balance and cognition. BUE tremors worse today after resolving yesterday. Of note, dual tasking, attention, and task initiation appear worse today than yesterday. He was

## 2025-07-17 LAB — IMMUNOLOGIST REVIEW: NORMAL

## 2025-07-17 PROCEDURE — 97130 THER IVNTJ EA ADDL 15 MIN: CPT

## 2025-07-17 PROCEDURE — 97535 SELF CARE MNGMENT TRAINING: CPT

## 2025-07-17 PROCEDURE — 97129 THER IVNTJ 1ST 15 MIN: CPT

## 2025-07-17 PROCEDURE — 1100000000 HC RM PRIVATE

## 2025-07-17 PROCEDURE — 2500000003 HC RX 250 WO HCPCS

## 2025-07-17 PROCEDURE — 99222 1ST HOSP IP/OBS MODERATE 55: CPT

## 2025-07-17 PROCEDURE — 97116 GAIT TRAINING THERAPY: CPT

## 2025-07-17 PROCEDURE — 97112 NEUROMUSCULAR REEDUCATION: CPT

## 2025-07-17 PROCEDURE — 6370000000 HC RX 637 (ALT 250 FOR IP)

## 2025-07-17 RX ADMIN — ACETAMINOPHEN 650 MG: 325 TABLET ORAL at 08:37

## 2025-07-17 RX ADMIN — ACETAMINOPHEN 650 MG: 325 TABLET ORAL at 17:13

## 2025-07-17 RX ADMIN — SODIUM CHLORIDE, PRESERVATIVE FREE 10 ML: 5 INJECTION INTRAVENOUS at 20:57

## 2025-07-17 RX ADMIN — SODIUM CHLORIDE, PRESERVATIVE FREE 10 ML: 5 INJECTION INTRAVENOUS at 08:38

## 2025-07-17 ASSESSMENT — PAIN DESCRIPTION - ORIENTATION
ORIENTATION: LOWER
ORIENTATION: LOWER

## 2025-07-17 ASSESSMENT — PAIN - FUNCTIONAL ASSESSMENT
PAIN_FUNCTIONAL_ASSESSMENT: PREVENTS OR INTERFERES SOME ACTIVE ACTIVITIES AND ADLS
PAIN_FUNCTIONAL_ASSESSMENT: PREVENTS OR INTERFERES SOME ACTIVE ACTIVITIES AND ADLS

## 2025-07-17 ASSESSMENT — PAIN DESCRIPTION - FREQUENCY
FREQUENCY: INTERMITTENT
FREQUENCY: INTERMITTENT

## 2025-07-17 ASSESSMENT — PAIN DESCRIPTION - DESCRIPTORS
DESCRIPTORS: ACHING
DESCRIPTORS: ACHING

## 2025-07-17 ASSESSMENT — PAIN SCALES - GENERAL
PAINLEVEL_OUTOF10: 3
PAINLEVEL_OUTOF10: 0
PAINLEVEL_OUTOF10: 1
PAINLEVEL_OUTOF10: 1
PAINLEVEL_OUTOF10: 3

## 2025-07-17 ASSESSMENT — PAIN DESCRIPTION - PAIN TYPE
TYPE: ACUTE PAIN
TYPE: ACUTE PAIN

## 2025-07-17 ASSESSMENT — PAIN DESCRIPTION - LOCATION
LOCATION: BACK
LOCATION: BACK

## 2025-07-17 ASSESSMENT — PAIN DESCRIPTION - ONSET
ONSET: GRADUAL
ONSET: GRADUAL

## 2025-07-17 NOTE — PROGRESS NOTES
ACUTE PHYSICAL THERAPY GOALS: (Goals ongoing 7/17/25)  (Developed with and agreed upon by patient and/or caregiver.)  1. Patient will transfers bed to chair with INDEPENDENCE within 7 days.  2. Patient will demonstrate FAIR+ DYNAMIC STANDING balance within 7 day(s).  3. Patient will ambulate 150ft+ with (MODIFIED) INDEPENDENCE within 7 days.  4. Patient will tolerate 25+ minutes of therapeutic activity/exercise and/or neuromuscular re-education while maintaining stable vitals to improve functional strength and activity tolerance within 7 days.  5. Patient will demonstrate a clinically significant improvement in TUG time within 7 days.  6. Patient will demonstrate a clinically significant improvement in Cognitive TUG time within 7 days.    PHYSICAL THERAPY Daily Note and AM  (Link to Caseload Tracking: PT Visit Days : 4  Acknowledge Orders  Time In/Out  PT Charge Capture  Rehab Caseload Tracker    Azam May Jr. is a 75 y.o. male   PRIMARY DIAGNOSIS: Essential hypertension  NPH (normal pressure hydrocephalus) ([facility]) [G91.2]    3 Days Post-Op  Reason for Referral: Difficulty in walking, Not elsewhere classified (R26.2)  History of falling (Z91.81)  Dizziness and Giddiness (R42)  Inpatient: Payor: MEDICARE / Plan: MEDICARE PART A AND B / Product Type: *No Product type* /     ASSESSMENT:     REHAB RECOMMENDATIONS:   Recommendation to date pending progress:  Setting:  Outpatient Therapy    Equipment:    To Be Determined     ASSESSMENT:  Mr. May is a 75 year old male admitted for lumbar drain trial as part of normal pressure hydrocephalus (NPH) work up. At baseline, patient is an independent, household-level ambulator without utilizing an assistive device. Reports difficulty with community-level ambulation tolerance, activity tolerance, and balance/gait status x6-7 months.     7/17- Patient is seen this AM for PT evaluation/treatment s/p 620cc of CSF drained. Timed Up and Go (TUG) time of 31.47 seconds and

## 2025-07-17 NOTE — PLAN OF CARE
Problem: Pain  Goal: Verbalizes/displays adequate comfort level or baseline comfort level  7/17/2025 1525 by Ly Carcamo RN  Outcome: Progressing     Problem: Discharge Planning  Goal: Discharge to home or other facility with appropriate resources  7/17/2025 1525 by Ly Carcamo RN  Outcome: Progressing  Flowsheets (Taken 7/17/2025 0830)  Discharge to home or other facility with appropriate resources: Identify barriers to discharge with patient and caregiver     Problem: Safety - Adult  Goal: Free from fall injury  7/17/2025 1525 by Ly Carcamo, RN  Outcome: Progressing     Problem: ABCDS Injury Assessment  Goal: Absence of physical injury  7/17/2025 1525 by Ly Carcamo, RN  Outcome: Progressing

## 2025-07-17 NOTE — PROGRESS NOTES
Neurosurgery Progress Note      Subjective    Mr. May is hospital day 3, hospitalized for lumbar drain trial.  He does not feel that he has had any improvement.  He does feel frustrated today and did not sleep last night.       Objective    Physical exam:  General: No acute distress  Awake, alert  Eyes open spontaneously   PERRL, EOMI, visual fields grossly intact to light and confrontation   Hearing grossly intact   Face symmetric and tongue mid-line on protrusion   No pronation or drift on exam   Sensation intact to light touch and pin-prick         Assessment & Plan    - Continue lumbar drain trial      On 07/17/25 I have spent 15 minutes reviewing previous notes, test results and face to face with the patient ( and any present family or support) discussing the diagnosis and plan. I have all answered questions to their satisfaction.     This note was created using voice recognition software.  All attempts were made to recognize and correct voice recognition errors. Unfortunately some errors may persist.  Penny Low, APRN - CNP

## 2025-07-17 NOTE — PROGRESS NOTES
GOALS:  LTG: Patient will improve neuro-linguistic abilities to increase safety and awareness in functional living environment.   STG: PROGRESSING 25  Patient will complete reasoning tasks to improve problem solving and safety awareness with 80% accuracy given minimal cueing.  Patient will recall relevant verbal information with 80% accuracy with minimal cues.  Patient will participate in ongoing cognitive-linguistic assessment as per NPH protocol.     SPEECH LANGUAGE PATHOLOGY: COGNITIVE COMMUNICATION Daily Note #3    Acknowledge Order  I  Therapy Time  I   Charges     I  Rehab Caseload Tracker    NAME: Azam May Jr.  : 1950  MRN: [id number]    ADMISSION DATE: 2025  PRIMARY DIAGNOSIS: Essential hypertension    ICD-10: Treatment Diagnosis:  R41.841 Cognitive-Communication Deficit    RECOMMENDATIONS:   Recommendations:   Continued speech therapy to target cognitive-linguistic deficits   Therapeutic Interventions: Patient/family education  Cognitive-linguistic treatment   Patient continues to require skilled intervention: Yes. Recommend ongoing speech therapy services during this hospitalization.   Anticipated Discharge Needs: Ongoing speech therapy is recommended at next level of care. Though recommendations may change as NPH trials continue.      ASSESSMENT   Patient continues with moderate cognitive-linguistic deficits. Today's score is commensurate to initial evaluation. Minor score fluctuations observed over course of trial, likely secondary to time of day/patient fatigue/etc. No functional improvement has been observed thus far. Deficits continue to impact orientation, recall, and executive functions. Patient demonstrates insight that he struggles with assessment during tasks though is unable to functionally compensate at this time.      Pre-Drain () Post-Drain (July 15)    Score (X/30) 16 14 17 16     GENERAL   Subjective:  BESS Modi cleared patient to  yesterday were not observed today. Suspect patient performance will fluctuate given time of day/level of fatigue/etc. On trial making task patient restarted multiple times, did not follow directions to alternate numbers and letters. Adequate cube drawing. On clock drawing task correct numbers though contour / clock hands incorrect. Patient remains unable to recall time. Patient lost point for identifying a rhinoceros as a hippopotamus. Remains unable to recall 5 words immediately or when given a short delay. Impaired auditory scanning. Patient able to complete serial sevens task with increased time and repetition of instructions. Unable to repeat short sentences secondary to impaired short term memory. Improvement noted in orientation.     Dr. Brown/Penny NP present in room at conclusion of session. Discussed cognitive presentation throughout trials and limited progress observed.     MODIFIED ROXIE SCALE (mRS) SCORE:   Score: 3 Description   [] 0  No Symptoms   [] 1 No significant disability despite symptoms; Able to carry out all usual duties and activities.   [] 2 Slight Disability: Unable to carry out all previous activities, but able to look after own affairs without assistance.    [x] 3 Moderate Disability Requires some help, but is able to walk without assistance.   [] 4 Moderately Severe Disability Unable to walk without assistance and unable to attend to own bodily needs without assistance.    [] 5 Severe disability Bedridden, incontinent, and requiring constant nursing care and attention.      PLAN    Duration/Frequency: Continue to follow patient 5x/week for duration of hospitalization and/or until goals met    Rehabilitation Potential For Stated Goals: Good    Interdisciplinary Collaboration: MD/ PA/ NP  and RN/ PCT    Medical Necessity    Skilled intervention continues to be required due to decreased cognitive skills.    Education:   Patient and/or family/caregiver educated on Results of evaluation,

## 2025-07-17 NOTE — PROGRESS NOTES
ACUTE OCCUPATIONAL THERAPY GOALS:   (Developed with and agreed upon by patient and/or caregiver.)  Patient will complete lower body dressing with modified independence.   Patient will complete functional transfers with modified independence.   Patient will complete functional mobility of household distances with modified independence.   Patient will complete dual task with no verbal cues or additional time.   Timeframe: 7 visits     OCCUPATIONAL THERAPY Daily Note       OT Visit Days: 4  Acknowledge Orders  Time  OT Charge Capture  Rehab Caseload Tracker      Azam May Jr. is a 75 y.o. male   PRIMARY DIAGNOSIS: Essential hypertension  NPH (normal pressure hydrocephalus) ([facility]) [G91.2]    3 Days Post-Op  Reason for Referral: Other lack of cordination (R27.8)  Inpatient: Payor: MEDICARE / Plan: MEDICARE PART A AND B / Product Type: *No Product type* /     ASSESSMENT:     REHAB RECOMMENDATIONS:   Recommendation to date pending progress:  Setting:  To be determined pending progress     Equipment:    To Be Determined     ASSESSMENT:  Mr. May is a 74 y/o M admitted for lumbar drain trial with Dr. Brown. Patient has 6 month history of shuffling gait, memory issues, & urinary incontinence. Lives with spouse & adult son who assist with medications & finances, but otherwise patient is fairly independent with ADLs & IADLs. Reports one recent fall, but admits to \"furniture walking.\" He has a cane & a walker but does not use them. He admits to being fairly sedentary spending most of his day watching TV. He still drives, but rarely. He is R hand dominant and admits to difficulty completing tasks such as throwing a ball.     7/17 620 ml drained: Patient remains discouraged & slightly agitated. Reports he is not sleeping at night. He was incontinent of bowel during today's session, which he states is a new problem. He continues to have deficits in balance which appear worse now compared to initial evaluation.      COGNITION/  PERCEPTION: INTACT IMPAIRED   (See Comments)   Orientation []  7/15: Ox person (name, ); Dx place, situation, time   : Dx place, time    Vision []     Hearing []  Hard of hearing    Cognition  [] Overall Cognitive Status: Exceptions  Arousal/Alertness: Appears intact  Memory: Decreased recall of biographical Information, Decreased short term memory, Decreased long term memory  Safety Judgement: Decreased awareness of need for assistance, Impaired  Problem Solving: Impaired  Insights: Decreased awareness of deficits  Initiation: Requires cues for some      : Additional processing time required; compensates for cognitive deficits with humor; decreased short term and long term memory; decreased awareness of need for assistance; decreased insight; requires some cues for initiation     7/15: additional processing time required; appears less aware of situation & timeframe today compared to yesterday; decreased recall (0 of 3 words from beginning to end of session); impaired dual tasking- able to name 2 words beginning with letter while walking     : needs additional time and max verbal cues for task initiation (worse today), needs additional processing time; appears more frustrated/ agitated by his deficits today; able to recall 1/3 words from beginning to end of session. Provided max verbal cues for remaining 2 words & able; decreased insight into deficits; decreased safety awareness; word finding difficulties     : impulsive/ decreased safety awareness; some insight; requires additional time for processing and task initiation; easily agitated with multiple commands; impaired memory even with use of memory strategies; able to recall 2/3 words from beginning to end of session using multiple memory strategies and with maximal cueing    Perception []       MOBILITY: I Mod I S SBA CGA Min Mod Max Total  NT x2 Comments:   Bed Mobility    Rolling [] [] [] [] [] [] [] [] [] [] []    Supine to

## 2025-07-18 ENCOUNTER — APPOINTMENT (OUTPATIENT)
Dept: CT IMAGING | Age: 75
End: 2025-07-18
Attending: NEUROLOGICAL SURGERY
Payer: MEDICARE

## 2025-07-18 ENCOUNTER — APPOINTMENT (OUTPATIENT)
Dept: GENERAL RADIOLOGY | Age: 75
End: 2025-07-18
Attending: NEUROLOGICAL SURGERY
Payer: MEDICARE

## 2025-07-18 PROBLEM — G93.41 SEPTIC ENCEPHALOPATHY: Status: ACTIVE | Noted: 2025-07-18

## 2025-07-18 PROBLEM — R65.20 SEVERE SEPSIS (HCC): Status: ACTIVE | Noted: 2025-07-18

## 2025-07-18 PROBLEM — N18.31 STAGE 3A CHRONIC KIDNEY DISEASE (CKD) (HCC): Status: ACTIVE | Noted: 2025-07-18

## 2025-07-18 PROBLEM — G03.9 MENINGITIS: Status: ACTIVE | Noted: 2025-07-18

## 2025-07-18 PROBLEM — A41.9 SEVERE SEPSIS (HCC): Status: ACTIVE | Noted: 2025-07-18

## 2025-07-18 LAB
ALBUMIN SERPL-MCNC: 3.8 G/DL (ref 3.2–4.6)
ALBUMIN/GLOB SERPL: 1.3 (ref 1–1.9)
ALP SERPL-CCNC: 67 U/L (ref 40–129)
ALT SERPL-CCNC: 24 U/L (ref 8–55)
ANION GAP SERPL CALC-SCNC: 13 MMOL/L (ref 7–16)
APPEARANCE FLD: NORMAL
APPEARANCE UR: CLEAR
AST SERPL-CCNC: 20 U/L (ref 15–37)
B PERT DNA SPEC QL NAA+PROBE: NOT DETECTED
BACTERIA URNS QL MICRO: NEGATIVE /HPF
BASOPHILS # BLD: 0.03 K/UL (ref 0–0.2)
BASOPHILS NFR BLD: 0.3 % (ref 0–2)
BILIRUB SERPL-MCNC: 2.3 MG/DL (ref 0–1.2)
BILIRUB UR QL: NEGATIVE
BORDETELLA PARAPERTUSSIS BY PCR: NOT DETECTED
BUN SERPL-MCNC: 23 MG/DL (ref 8–23)
C GATTII+NEOFOR DNA CSF QL NAA+NON-PROBE: NOT DETECTED
C PNEUM DNA SPEC QL NAA+PROBE: NOT DETECTED
CALCIUM SERPL-MCNC: 9.4 MG/DL (ref 8.8–10.2)
CASTS URNS QL MICRO: 0 /LPF
CHLORIDE SERPL-SCNC: 104 MMOL/L (ref 98–107)
CMV DNA CSF QL NAA+NON-PROBE: NOT DETECTED
CO2 SERPL-SCNC: 21 MMOL/L (ref 20–29)
COLOR FLD: NORMAL
COLOR UR: ABNORMAL
CREAT SERPL-MCNC: 1.67 MG/DL (ref 0.8–1.3)
CRYSTALS URNS QL MICRO: 0 /LPF
DIFFERENTIAL METHOD BLD: ABNORMAL
E COLI K1 DNA CSF QL NAA+NON-PROBE: NOT DETECTED
EOSINOPHIL # BLD: 0.02 K/UL (ref 0–0.8)
EOSINOPHIL NFR BLD: 0.2 % (ref 0.5–7.8)
EPI CELLS #/AREA URNS HPF: ABNORMAL /HPF
ERYTHROCYTE [DISTWIDTH] IN BLOOD BY AUTOMATED COUNT: 14.5 % (ref 11.9–14.6)
EV RNA CSF QL NAA+NON-PROBE: NOT DETECTED
FLUAV SUBTYP SPEC NAA+PROBE: NOT DETECTED
FLUBV RNA SPEC QL NAA+PROBE: NOT DETECTED
GLOBULIN SER CALC-MCNC: 3 G/DL (ref 2.3–3.5)
GLUCOSE CSF-MCNC: 48 MG/DL (ref 50–70)
GLUCOSE SERPL-MCNC: 113 MG/DL (ref 70–99)
GLUCOSE UR STRIP.AUTO-MCNC: NEGATIVE MG/DL
GP B STREP DNA CSF QL NAA+NON-PROBE: NOT DETECTED
HADV DNA SPEC QL NAA+PROBE: NOT DETECTED
HAEM INFLU DNA CSF QL NAA+NON-PROBE: NOT DETECTED
HCOV 229E RNA SPEC QL NAA+PROBE: NOT DETECTED
HCOV HKU1 RNA SPEC QL NAA+PROBE: NOT DETECTED
HCOV NL63 RNA SPEC QL NAA+PROBE: NOT DETECTED
HCOV OC43 RNA SPEC QL NAA+PROBE: NOT DETECTED
HCT VFR BLD AUTO: 44 % (ref 41.1–50.3)
HGB BLD-MCNC: 15.1 G/DL (ref 13.6–17.2)
HGB UR QL STRIP: NEGATIVE
HHV6 DNA CSF QL NAA+NON-PROBE: NOT DETECTED
HMPV RNA SPEC QL NAA+PROBE: NOT DETECTED
HPIV1 RNA SPEC QL NAA+PROBE: NOT DETECTED
HPIV2 RNA SPEC QL NAA+PROBE: NOT DETECTED
HPIV3 RNA SPEC QL NAA+PROBE: NOT DETECTED
HPIV4 RNA SPEC QL NAA+PROBE: NOT DETECTED
HSV1 DNA CSF QL NAA+PROBE: NOT DETECTED
HSV2 DNA CSF QL NAA+NON-PROBE: NOT DETECTED
HYALINE CASTS URNS QL MICRO: ABNORMAL /LPF
IMM GRANULOCYTES # BLD AUTO: 0.1 K/UL (ref 0–0.5)
IMM GRANULOCYTES NFR BLD AUTO: 1 % (ref 0–5)
KETONES UR QL STRIP.AUTO: ABNORMAL MG/DL
L MONOCYTOG DNA CSF QL NAA+NON-PROBE: NOT DETECTED
LACTATE SERPL-SCNC: 1 MMOL/L (ref 0.5–2)
LEUKOCYTE ESTERASE UR QL STRIP.AUTO: NEGATIVE
LYMPHOCYTES # BLD: 0.33 K/UL (ref 0.5–4.6)
LYMPHOCYTES NFR BLD: 3.4 % (ref 13–44)
LYMPHOCYTES NFR BRONCH MANUAL: 2 %
M PNEUMO DNA SPEC QL NAA+PROBE: NOT DETECTED
MCH RBC QN AUTO: 32.5 PG (ref 26.1–32.9)
MCHC RBC AUTO-ENTMCNC: 34.3 G/DL (ref 31.4–35)
MCV RBC AUTO: 94.6 FL (ref 82–102)
MONOCYTES # BLD: 0.69 K/UL (ref 0.1–1.3)
MONOCYTES NFR BLD: 7.1 % (ref 4–12)
MUCOUS THREADS URNS QL MICRO: 0 /LPF
N MEN DNA CSF QL NAA+NON-PROBE: NOT DETECTED
NEUTROPHILS NFR BRONCH MANUAL: 98 %
NEUTS SEG # BLD: 8.54 K/UL (ref 1.7–8.2)
NEUTS SEG NFR BLD: 88 % (ref 43–78)
NITRITE UR QL STRIP.AUTO: NEGATIVE
NRBC # BLD: 0 K/UL (ref 0–0.2)
NUC CELL # FLD: 1250 /CU MM
PARECHOVIRUS A RNA CSF QL NAA+NON-PROBE: NOT DETECTED
PH UR STRIP: 5.5 (ref 5–9)
PLATELET # BLD AUTO: 143 K/UL (ref 150–450)
PMV BLD AUTO: 11.1 FL (ref 9.4–12.3)
POTASSIUM SERPL-SCNC: 4.2 MMOL/L (ref 3.5–5.1)
PROCALCITONIN SERPL-MCNC: 0.23 NG/ML (ref 0–0.1)
PROT CSF-MCNC: 172 MG/DL (ref 15–45)
PROT SERPL-MCNC: 6.8 G/DL (ref 6.3–8.2)
PROT UR STRIP-MCNC: NEGATIVE MG/DL
RBC # BLD AUTO: 4.65 M/UL (ref 4.23–5.6)
RBC # FLD: 11 /CU MM
RBC #/AREA URNS HPF: ABNORMAL /HPF
RSV RNA SPEC QL NAA+PROBE: NOT DETECTED
RV+EV RNA SPEC QL NAA+PROBE: NOT DETECTED
S PNEUM DNA CSF QL NAA+NON-PROBE: NOT DETECTED
SARS-COV-2 RNA RESP QL NAA+PROBE: NOT DETECTED
SODIUM SERPL-SCNC: 139 MMOL/L (ref 136–145)
SP GR UR REFRACTOMETRY: 1.02 (ref 1–1.02)
SPECIMEN SOURCE FLD: NORMAL
TUBE # CSF: 1
TUBE # CSF: 1
URINE CULTURE IF INDICATED: ABNORMAL
UROBILINOGEN UR QL STRIP.AUTO: 0.2 EU/DL (ref 0.2–1)
VZV DNA CSF QL NAA+NON-PROBE: NOT DETECTED
WBC # BLD AUTO: 9.7 K/UL (ref 4.3–11.1)
WBC URNS QL MICRO: ABNORMAL /HPF

## 2025-07-18 PROCEDURE — 87483 CNS DNA AMP PROBE TYPE 12-25: CPT

## 2025-07-18 PROCEDURE — 89050 BODY FLUID CELL COUNT: CPT

## 2025-07-18 PROCEDURE — 6360000002 HC RX W HCPCS: Performed by: NURSE PRACTITIONER

## 2025-07-18 PROCEDURE — 70450 CT HEAD/BRAIN W/O DYE: CPT

## 2025-07-18 PROCEDURE — 2580000003 HC RX 258: Performed by: INTERNAL MEDICINE

## 2025-07-18 PROCEDURE — 84157 ASSAY OF PROTEIN OTHER: CPT

## 2025-07-18 PROCEDURE — 99222 1ST HOSP IP/OBS MODERATE 55: CPT

## 2025-07-18 PROCEDURE — 71046 X-RAY EXAM CHEST 2 VIEWS: CPT

## 2025-07-18 PROCEDURE — 87040 BLOOD CULTURE FOR BACTERIA: CPT

## 2025-07-18 PROCEDURE — 2580000003 HC RX 258: Performed by: NURSE PRACTITIONER

## 2025-07-18 PROCEDURE — 2580000003 HC RX 258

## 2025-07-18 PROCEDURE — 83605 ASSAY OF LACTIC ACID: CPT

## 2025-07-18 PROCEDURE — 6370000000 HC RX 637 (ALT 250 FOR IP)

## 2025-07-18 PROCEDURE — 6360000002 HC RX W HCPCS: Performed by: INTERNAL MEDICINE

## 2025-07-18 PROCEDURE — 85025 COMPLETE CBC W/AUTO DIFF WBC: CPT

## 2025-07-18 PROCEDURE — 2500000003 HC RX 250 WO HCPCS

## 2025-07-18 PROCEDURE — 1100000000 HC RM PRIVATE

## 2025-07-18 PROCEDURE — 81001 URINALYSIS AUTO W/SCOPE: CPT

## 2025-07-18 PROCEDURE — 84145 PROCALCITONIN (PCT): CPT

## 2025-07-18 PROCEDURE — 80053 COMPREHEN METABOLIC PANEL: CPT

## 2025-07-18 PROCEDURE — 0202U NFCT DS 22 TRGT SARS-COV-2: CPT

## 2025-07-18 PROCEDURE — 2500000003 HC RX 250 WO HCPCS: Performed by: INTERNAL MEDICINE

## 2025-07-18 PROCEDURE — 87070 CULTURE OTHR SPECIMN AEROBIC: CPT

## 2025-07-18 PROCEDURE — 87205 SMEAR GRAM STAIN: CPT

## 2025-07-18 PROCEDURE — 36415 COLL VENOUS BLD VENIPUNCTURE: CPT

## 2025-07-18 PROCEDURE — 6360000002 HC RX W HCPCS

## 2025-07-18 PROCEDURE — 82945 GLUCOSE OTHER FLUID: CPT

## 2025-07-18 RX ORDER — METRONIDAZOLE 500 MG/100ML
500 INJECTION, SOLUTION INTRAVENOUS EVERY 8 HOURS
Status: DISCONTINUED | OUTPATIENT
Start: 2025-07-18 | End: 2025-07-18

## 2025-07-18 RX ADMIN — ACETAMINOPHEN 650 MG: 325 TABLET ORAL at 03:19

## 2025-07-18 RX ADMIN — MEROPENEM 2000 MG: 1 INJECTION INTRAVENOUS at 21:40

## 2025-07-18 RX ADMIN — METRONIDAZOLE 500 MG: 500 INJECTION, SOLUTION INTRAVENOUS at 15:12

## 2025-07-18 RX ADMIN — ACETAMINOPHEN 650 MG: 325 TABLET ORAL at 09:49

## 2025-07-18 RX ADMIN — VANCOMYCIN HYDROCHLORIDE 2000 MG: 10 INJECTION, POWDER, LYOPHILIZED, FOR SOLUTION INTRAVENOUS at 11:49

## 2025-07-18 RX ADMIN — ONDANSETRON 4 MG: 2 INJECTION, SOLUTION INTRAMUSCULAR; INTRAVENOUS at 08:32

## 2025-07-18 RX ADMIN — SODIUM CHLORIDE, PRESERVATIVE FREE 10 ML: 5 INJECTION INTRAVENOUS at 08:32

## 2025-07-18 RX ADMIN — WATER 2000 MG: 1 INJECTION INTRAMUSCULAR; INTRAVENOUS; SUBCUTANEOUS at 11:43

## 2025-07-18 RX ADMIN — SODIUM CHLORIDE: 0.9 INJECTION, SOLUTION INTRAVENOUS at 11:49

## 2025-07-18 ASSESSMENT — PAIN DESCRIPTION - ONSET: ONSET: GRADUAL

## 2025-07-18 ASSESSMENT — PAIN SCALES - GENERAL
PAINLEVEL_OUTOF10: 0
PAINLEVEL_OUTOF10: 4
PAINLEVEL_OUTOF10: 0
PAINLEVEL_OUTOF10: 0

## 2025-07-18 ASSESSMENT — PAIN DESCRIPTION - PAIN TYPE: TYPE: ACUTE PAIN

## 2025-07-18 ASSESSMENT — PAIN DESCRIPTION - LOCATION: LOCATION: HEAD

## 2025-07-18 ASSESSMENT — PAIN DESCRIPTION - DESCRIPTORS: DESCRIPTORS: ACHING

## 2025-07-18 ASSESSMENT — PAIN - FUNCTIONAL ASSESSMENT: PAIN_FUNCTIONAL_ASSESSMENT: PREVENTS OR INTERFERES SOME ACTIVE ACTIVITIES AND ADLS

## 2025-07-18 ASSESSMENT — PAIN DESCRIPTION - FREQUENCY: FREQUENCY: INTERMITTENT

## 2025-07-18 NOTE — CONSULTS
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 Meningitis PCR negative.   Continue Vancomycin pharmacy to dose  Change Merrem to CNS dose/renal adjusted 2g IV Q12hrs  Discontinue Ampicillin, Flagyl  Follow cultures, fever and  mentation response  Of mental status worsens/fails to improve: consider MRI brain with contrast-primary team made aware    NPH, admitted for lumbar drain trial--placed 25  Outpatient memory, gait, incontinence issues for 6-7 months    Anti-infectives:   Vanc  Cefepime  Ampicillin  Flagyl     Subjective:   Date of Consultation:  2025  Referring Physician: Gustavo Brown,* for: assistance in management of the above    Patient is a 75 y.o. male Who has been followed by neurology for 6-7 months of changes running his memory, gait, incontinence.  MRI with concerns for normal pressure hydrocephalus.  He was admitted to have a trial of a lumbar drain to see if his symptoms would improve.  The drain was placed on 7/15/2025, there is some concern that the drain might have dislodged earlier this week.  This morning he woke up with acute confusion, fever as high as 102, nausea with emesis.  He has also been seen to have chills and rigors.  Blood cultures are pending, CSF was sent from his lumbar drain prior to discontinuation of the drain, cell counts noting WBC 1250, 98% neutrophils, glucose 48, protein 172, cultures pending, Gram stain without organisms.  Meningitis pathogen's PCR negative.  Patient has been started on IV vancomycin, IV cefepime, IV ampicillin and IV Flagyl.  ID is consulted to assist with treatment recommendations.  His wife is at bedside and assists with HPI.  Patient is

## 2025-07-18 NOTE — PROGRESS NOTES
Physical Therapy Note:    Attempted to see patient for physical therapy treatment session. Discussion with neurosurgery NP that pt now has a temp and they are pulling the drain and performing a full work up. Instructed to hold therapy/mobility, and no longer continue lumbar drain protocol as it is now pulled. Will follow and re-attempt as schedule permits/patient available to further assess mobility and discharge needs once medically appropriate.    Thank you,  Carolina Delgado, PT, DPT

## 2025-07-18 NOTE — PROGRESS NOTES
Nutrition Assessment  Assessment Type: Reassess  Reason for visit:  Best Practice Alert: Malnutrition Screening Tool   Malnutrition Screening Tool Score: 3  Unintentional weight loss PTA: 24 to 33 pounds (3 points)  Eating poorly due to decreased appetite: No (0 points)    Nutrition Intervention:   Food and/or Nutrient Delivery:   Meals and Snacks:  Diet: Continue current order  Medical Food Supplements:   Medical food supplement therapy:  None Not indicated     Malnutrition Assessment:  Academy/A.S.P.E.N Clinical Malnutrition Criteria  Malnutrition Status: No malnutrition  Nutrition Focused Physical Exam: Unremarkable     Nutrition Assessment:  Food/Nutrition Related History: Pt reports decreased intake for ~1 month. He stated he thought he had an appetite but now he realized he did not. Pt reports prior to this his intake was at baseline. He stated he does not drink ONS.      Do You Have Any Cultural, Mandaen, or Ethnic Food Preferences?: No   Weight History: Pt denies any recent wt changes.   Nursing states pt reported 30# wt loss on admission screen.   Wt hx per EMR review:   FM office  203# 5/20/25, 210# 2/28/25, 208# 2/20/25, 209#  1/21/25, 209# 5/1/23.    Neurosurgery office:   203# 6/17/25.    Fluctuating wt pattern noted at FM office, unable to validate 30# wt loss reported to nursing on screen.    Nutrition Background:       PMH remarkable for prostate cancer, GERD s/p nissen fundoplication, HTN, CKD3.    Admitted for lumbar drain trial for suspected normal pressure hydrocephalus.    Nutrition Monitoring/Evaluation:  SLP following for cognitive-linguistics deficits.      Pt seen laying in bed with wife present. She reports pt is eating all of his meals. He agrees with wife report. He stated he has not had lunch yet because he has to lay flat after his drain removal.     Current Nutrition Therapies:  ADULT DIET; Regular    Current Intake:   Average Meal Intake: % Average Supplements Intake: None

## 2025-07-18 NOTE — PROGRESS NOTES
VANCO DAILY FOLLOW UP NOTE  [facility]   Pharmacy Pharmacokinetic Monitoring Service - Vancomycin    Consulting Provider: Dr. Cuenca   Indication: CNS infection  Target Concentration: Goal AUC/JEANNE 400-600 mg*hr/L  Day of Therapy: 1  Additional Antimicrobials: ampicillin, cefepime    Pertinent Laboratory Values:   Wt Readings from Last 1 Encounters:   07/14/25 93 kg (205 lb)     Temp Readings from Last 1 Encounters:   07/18/25 (!) 101.5 °F (38.6 °C)     Recent Labs     07/18/25  1144   BUN 23   CREATININE 1.67*   WBC 9.7   LACTA 1.0     Estimated Creatinine Clearance: 45 mL/min (A) (based on SCr of 1.67 mg/dL (H)).    No results found for: \"VANCOTROUGH\", \"VANCORANDOM\"    MRSA Nasal Swab: N/A. Non-respiratory infection    Assessment:  Date/Time Dose Concentration AUC         Note: Serum concentrations collected for AUC dosing may appear elevated if collected in close proximity to the dose administered, this is not necessarily an indication of toxicity    Plan:  Dosing recommendations based on Bayesian software  Start vancomycin 2000 mg x 1 followed by 1250 mg every 24 hours  Anticipated AUC of 509 and trough concentration of 14.5 at steady state  Renal labs as indicated   Vancomycin concentrations will be ordered as clinically appropriate  Pharmacy will continue to monitor patient and adjust therapy as indicated    Thank you for the consult,  Chitra Salomon RPH

## 2025-07-18 NOTE — PROGRESS NOTES
Neurosurgery Progress Note      Subjective    Mr. May is hospital day 4, hospitalized for lumbar drain trial.  He had 1 episode of emesis this morning.  Nursing staff has noticed that he is more lethargic.  He states that he is feeling \"okay\".    Objective    Physical exam:  Ill-appearing, slow to answer questions  General: No acute distress  Eyes open spontaneously   PERRL, EOMI, visual fields grossly intact to light and confrontation   Hearing grossly intact   Face symmetric and tongue mid-line on protrusion   No pronation or drift on exam   Sensation intact to light touch and pin-prick         Assessment & Plan    Will discontinue lumbar drain trial, collect CSF studies prior to discontinuation of drain.  Will initiate sepsis workup.  Additionally will obtain stat head CT.  Patient discussed with Dr. Brown.  Will reach out to medicine for assistance with management.      On 07/18/25 I have spent 15 minutes reviewing previous notes, test results and face to face with the patient ( and any present family or support) discussing the diagnosis and plan. I have all answered questions to their satisfaction.     This note was created using voice recognition software.  All attempts were made to recognize and correct voice recognition errors. Unfortunately some errors may persist.  Penny Low, APRN - CNP

## 2025-07-18 NOTE — PROGRESS NOTES
HOLD OT per NP due to new onset fever, sepsis workup, and lumbar drain being pulled.   Kay Camacho, OT

## 2025-07-18 NOTE — CONSULTS
[facility] Hospitalist Consult   Admit Date:  2025  3:53 PM   Name:  Azam May Jr.   Age:  75 y.o.  Sex:  male  :  1950   MRN:  [id number]   Room:  [facility]/    Presenting/Chief Complaint: No chief complaint on file.    Reason(s) for Admission: NPH (normal pressure hydrocephalus) (HCC) [G91.2]     Hospitalists consulted by Gustavo Brown,* for: fever with lumbar CSF drain    History of Presenting Illness:   Azam May Jr. is a 75 y.o. male admitted to the neurosurgery team for suspected NPH for a tiral of a lumbar drain to see if CSF drainage would help with his symptoms. Hospitalist service consulted as patient with new fever and worsening somnolence this morning. Patient had tried to pull out the drain in his back.     At bedside, patient lethargic but will answer simple questions when asked. He is moving all 4 extremities. Further history limited by clinical condition.     Assessment & Plan:   # Severe sepsis   # Meningitis/encephalitis  - agree with blood cultures and with CSF studies (GS/culture, cytology, protein, glucose)  - vancomycin with pharmacy to dose  - cefepime empirically  - ampicillin for Listeria coverage  - will consult ID for further recs  - supportive care    # Septic encephalopathy  - CT imaging shows atherosclerotic changes and possible ventriculomegaly   - management as above  - avoid sedating meds and narcotics  - nonpharmacologic interventions  - if worsening, repeat imaging     # CKD Stage III  - creatinine close to baseline  - monitor volume status and labs    # ?NPH  - per primary    # History of prostate cancer  - noted, complicates course of hospitalization  - likely not contributing to acute encephalopathy     Anticipated Discharge Arrangements:   Defer to primary team    PT/OT evals ordered?  Therapy evals ordered  Diet:  ADULT DIET; Regular  VTE prophylaxis: Defer to primary team  Code status: Full Code    Non-peripheral Lines and Tubes (if

## 2025-07-18 NOTE — PROGRESS NOTES
SPEECH LANGUAGE PATHOLOGY: ATTEMPT     NAME: Azam May Jr.  : 1950  MRN: [id number]    ADMISSION DATE: 2025  PRIMARY DIAGNOSIS: Essential hypertension    Speech Therapy attempted. Neurosurgery discontinuing lumbar drain trial, patient with change in status this date/undergoing sepsis workup. Will continue to follow as indicated. Recommend continued speech therapy at next level of care to address cognitive-linguistic deficits. Thank you!     CARLA MOTT  2025 11:35 AM

## 2025-07-18 NOTE — CARE COORDINATION
Patient currently being worked up for Sepsis, had previously denies any HH needs but will need to re-address with patient. RN SHELBY in room, asked patient if he would like me to call his wife and have her come sit with him and he declines at this time. DC date remains TBD and BESS RYAN will continue to follow for DC needs.

## 2025-07-19 ENCOUNTER — APPOINTMENT (OUTPATIENT)
Dept: MRI IMAGING | Age: 75
End: 2025-07-19
Attending: NEUROLOGICAL SURGERY
Payer: MEDICARE

## 2025-07-19 ENCOUNTER — APPOINTMENT (OUTPATIENT)
Dept: GENERAL RADIOLOGY | Age: 75
End: 2025-07-19
Attending: NEUROLOGICAL SURGERY
Payer: MEDICARE

## 2025-07-19 PROCEDURE — 6370000000 HC RX 637 (ALT 250 FOR IP)

## 2025-07-19 PROCEDURE — 2580000003 HC RX 258: Performed by: INTERNAL MEDICINE

## 2025-07-19 PROCEDURE — 74019 RADEX ABDOMEN 2 VIEWS: CPT

## 2025-07-19 PROCEDURE — 6360000002 HC RX W HCPCS: Performed by: NURSE PRACTITIONER

## 2025-07-19 PROCEDURE — 99232 SBSQ HOSP IP/OBS MODERATE 35: CPT | Performed by: NEUROLOGICAL SURGERY

## 2025-07-19 PROCEDURE — 2500000003 HC RX 250 WO HCPCS

## 2025-07-19 PROCEDURE — 36415 COLL VENOUS BLD VENIPUNCTURE: CPT

## 2025-07-19 PROCEDURE — 6360000004 HC RX CONTRAST MEDICATION

## 2025-07-19 PROCEDURE — 70553 MRI BRAIN STEM W/O & W/DYE: CPT

## 2025-07-19 PROCEDURE — A9579 GAD-BASE MR CONTRAST NOS,1ML: HCPCS

## 2025-07-19 PROCEDURE — 86789 WEST NILE VIRUS ANTIBODY: CPT

## 2025-07-19 PROCEDURE — 1100000000 HC RM PRIVATE

## 2025-07-19 PROCEDURE — 2580000003 HC RX 258: Performed by: NURSE PRACTITIONER

## 2025-07-19 PROCEDURE — 6360000002 HC RX W HCPCS: Performed by: INTERNAL MEDICINE

## 2025-07-19 PROCEDURE — 86788 WEST NILE VIRUS AB IGM: CPT

## 2025-07-19 PROCEDURE — 2580000003 HC RX 258

## 2025-07-19 RX ORDER — GADOTERIDOL 279.3 MG/ML
18 INJECTION INTRAVENOUS
Status: COMPLETED | OUTPATIENT
Start: 2025-07-19 | End: 2025-07-19

## 2025-07-19 RX ORDER — ACETAMINOPHEN 325 MG/1
650 TABLET ORAL EVERY 6 HOURS PRN
Status: DISCONTINUED | OUTPATIENT
Start: 2025-07-19 | End: 2025-07-25 | Stop reason: HOSPADM

## 2025-07-19 RX ADMIN — GADOTERIDOL 18 ML: 279.3 INJECTION, SOLUTION INTRAVENOUS at 16:52

## 2025-07-19 RX ADMIN — VANCOMYCIN HYDROCHLORIDE 1250 MG: 10 INJECTION, POWDER, LYOPHILIZED, FOR SOLUTION INTRAVENOUS at 12:24

## 2025-07-19 RX ADMIN — SODIUM CHLORIDE, PRESERVATIVE FREE 10 ML: 5 INJECTION INTRAVENOUS at 20:23

## 2025-07-19 RX ADMIN — SODIUM CHLORIDE: 0.9 INJECTION, SOLUTION INTRAVENOUS at 20:05

## 2025-07-19 RX ADMIN — MEROPENEM 2000 MG: 1 INJECTION INTRAVENOUS at 20:06

## 2025-07-19 RX ADMIN — ACETAMINOPHEN 650 MG: 325 TABLET ORAL at 19:54

## 2025-07-19 RX ADMIN — MEROPENEM 2000 MG: 1 INJECTION INTRAVENOUS at 08:45

## 2025-07-19 ASSESSMENT — PAIN SCALES - GENERAL
PAINLEVEL_OUTOF10: 0
PAINLEVEL_OUTOF10: 0

## 2025-07-19 NOTE — PLAN OF CARE
Problem: Pain  Goal: Verbalizes/displays adequate comfort level or baseline comfort level  7/19/2025 0038 by Britni Maher RN  Outcome: Progressing  Flowsheets (Taken 7/18/2025 2000)  Verbalizes/displays adequate comfort level or baseline comfort level:   Encourage patient to monitor pain and request assistance   Assess pain using appropriate pain scale  7/18/2025 1153 by Rachel Slater RN  Outcome: Progressing     Problem: Discharge Planning  Goal: Discharge to home or other facility with appropriate resources  7/19/2025 0038 by Britni Maher RN  Outcome: Progressing  Flowsheets (Taken 7/18/2025 2000)  Discharge to home or other facility with appropriate resources:   Identify barriers to discharge with patient and caregiver   Identify discharge learning needs (meds, wound care, etc)  7/18/2025 1153 by Rachel Slater RN  Outcome: Progressing     Problem: Safety - Adult  Goal: Free from fall injury  7/19/2025 0038 by Britni Maher RN  Outcome: Progressing  Flowsheets (Taken 7/18/2025 2000)  Free From Fall Injury: Instruct family/caregiver on patient safety  7/18/2025 1153 by Rachel Slater RN  Outcome: Progressing     Problem: ABCDS Injury Assessment  Goal: Absence of physical injury  7/19/2025 0038 by Britni Maher RN  Outcome: Progressing  Flowsheets (Taken 7/18/2025 2000)  Absence of Physical Injury: Implement safety measures based on patient assessment  7/18/2025 1153 by Rachel Slater RN  Outcome: Progressing     Problem: Skin/Tissue Integrity  Goal: Skin integrity remains intact  Description: 1.  Monitor for areas of redness and/or skin breakdown  2.  Assess vascular access sites hourly  3.  Every 4-6 hours minimum:  Change oxygen saturation probe site  4.  Every 4-6 hours:  If on nasal continuous positive airway pressure, respiratory therapy assess nares and determine need for appliance change or resting period  7/19/2025 0038 by Britni Maher RN  Outcome: Progressing  Flowsheets (Taken 7/18/2025

## 2025-07-19 NOTE — PROGRESS NOTES
Infectious Diseases Progress Note    Today's Date: 2025   Admit Date: 2025  : 1950    Impression     Post procedure meningitis, noted after lumbar drain placement-drain removed this am  Acute AMS, N/V, fever  CSF send from lumbar drain prior to removal:   WBC 1250, 98% neutrophils, gluc 48, protein 172, cx pending, GS negative. Meningitis PCR negative.   Blood cx x 2  NGTD  NPH, admitted for lumbar drain trial--placed 25  Outpatient memory, gait, incontinence issues for 6-7 months  Impression   Continue Vancomycin pharmacy to dose  Continue Merrem to CNS dose/renal adjusted 2g IV Q12hrs  Follow cultures, fever and  mentation response  If mental status worsens/fails to improve: consider MRI brain with contrast-primary  ID following  Anti-infectives:   Vanc -  Meropenem -  Cefepime/Flagyl on     Subjective:   Interval History  Tm 101.5 yesterday AM, Tc 99.7  WBC normal, CRT at baseline  He is awake, alert and answers questions but could not tell me where he is. This seems similar to yesterdays documented exam.     Patient is a 75 y.o. male Who has been followed by neurology for 6-7 months of changes running his memory, gait, incontinence.  MRI with concerns for normal pressure hydrocephalus.  He was admitted to have a trial of a lumbar drain to see if his symptoms would improve.  The drain was placed on 7/15/2025, there is some concern that the drain might have dislodged earlier this week.  This morning he woke up with acute confusion, fever as high as 102, nausea with emesis.  He has also been seen to have chills and rigors.  Blood cultures are pending, CSF was sent from his lumbar drain prior to discontinuation of the drain, cell counts noting WBC 1250, 98% neutrophils, glucose 48, protein 172, cultures pending, Gram stain without organisms.  Meningitis pathogen's PCR negative.  Patient has been started on IV vancomycin, IV cefepime, IV ampicillin and IV Flagyl.  ID is

## 2025-07-19 NOTE — PROGRESS NOTES
Patient continues to be lethargic throughout night shift.  When asked simple questions he would only nod his head.  He does not report N/V throughout shift and states he was not hungry for dinner. Patient would drink water without any complications. As shift progressed, his abdomen became more distended and taut, though he denies discomfort or tenderness with palpation. Bowel sounds hypoactive. He was unable to answer if he is passing flatus. By the end of the shift patient was answering \"yes/no\" to some questions. Temperature continues to be slightly elevated at 99.5, with blood pressure slightly elevated during the shift. Slight dime size drainage area noted to lower back. Pt overall very lethargic. Will continue to monitor.

## 2025-07-19 NOTE — PROGRESS NOTES
Resident Progress Note   Admit Date:  2025  3:53 PM   Name:  Azam May Jr.   Age:  75 y.o.  Sex:  male  :  1950   MRN:  [id number]   Room:  [facility]/    Presenting Complaint: AMS     Reason(s) for Admission: NPH (normal pressure hydrocephalus) (HCC) [G91.2]     Hospital Course:   A 75-year-old male who has been following with neurology for almost 7-month presented to the hospital with memory, gait changes associated with urinary incontinence patient was admitted initially and there neurosurgery as the MRI concerns for new normal pressure hydrocephalus.  A trial of lumbar drain and was done placed on July 15, 2025 and concern of dislodgment was raised earlier this week. Yesterday the patient woke up with acute confusion associated with fever; nausea and vomiting.  Patient was reporting chills and rigor blood culture of 2 sets were sent and they are negative for 18 and 16 hours so far. CSF culture and Gram stain is also pending CSF routine is high of 172 and white cell count of 1250 with neutrophils of 98% patient was placed on IV vancomycin IV cefepime IV ampicillin and IV Flagyl.  ID team consulted for concern of meningitis versus encephalitis ampicillin and Flagyl were stopped and the switch to meropenem 2 g IV every 12 hours. CT head was concerning for ventriculomegaly and atherosclerosis. Chest x-ray was concerning for atelectasis versus pneumonia and showing small pleural effusion.    Subjective & 24-hour events:  Patient seen and examined today he is reluctant to speak vitally is afebrile he denies being oriented to the time his blood pressure is fluctuating last reading is 137/84 with MAP of 102 oxygen saturation 93% his labs showing creatinine of 1.6 with GFR of 43 procalcitonin upon admission was high 0.23 and white cell count is normal 9.7 respiratory panel is negative and urine is showing only trace ketone    Assessment & Plan:   A 75-year-old male admitted with concern of normal

## 2025-07-19 NOTE — PROGRESS NOTES
VANCO DAILY FOLLOW UP NOTE  [facility]   Pharmacy Pharmacokinetic Monitoring Service - Vancomycin    Consulting Provider: Dr. Cuenca   Indication: CNS infection  Target Concentration: Goal AUC/JEANNE 400-600 mg*hr/L  Day of Therapy: 2  Additional Antimicrobials: meropenem    Pertinent Laboratory Values:   Wt Readings from Last 1 Encounters:   07/14/25 93 kg (205 lb)     Temp Readings from Last 1 Encounters:   07/19/25 99.7 °F (37.6 °C) (Axillary)     Recent Labs     07/18/25  1144   BUN 23   CREATININE 1.67*   WBC 9.7   PROCAL 0.23*   LACTA 1.0     Estimated Creatinine Clearance: 45 mL/min (A) (based on SCr of 1.67 mg/dL (H)).    No results found for: \"VANCOTROUGH\", \"VANCORANDOM\"    MRSA Nasal Swab: N/A. Non-respiratory infection    Assessment:  Date/Time Dose Concentration AUC         Note: Serum concentrations collected for AUC dosing may appear elevated if collected in close proximity to the dose administered, this is not necessarily an indication of toxicity    Plan:  Dosing recommendations based on Bayesian software  Continue vancomycin 1250 mg every 24 hours  Renal labs as indicated   Vancomycin concentrations will be ordered as clinically appropriate  Pharmacy will continue to monitor patient and adjust therapy as indicated    Thank you for the consult,  Seymour Onofre, PharmD, BCOP  Clinical Pharmacist  Contact [employer]

## 2025-07-19 NOTE — PLAN OF CARE
Problem: Pain  Goal: Verbalizes/displays adequate comfort level or baseline comfort level  7/19/2025 1011 by Domi Schuler RN  Outcome: Not Progressing  7/19/2025 0038 by Britni Maher RN  Outcome: Progressing  Flowsheets (Taken 7/18/2025 2000)  Verbalizes/displays adequate comfort level or baseline comfort level:   Encourage patient to monitor pain and request assistance   Assess pain using appropriate pain scale     Problem: Discharge Planning  Goal: Discharge to home or other facility with appropriate resources  7/19/2025 1011 by Domi Schuler RN  Outcome: Not Progressing  Flowsheets (Taken 7/19/2025 0959)  Discharge to home or other facility with appropriate resources:   Identify barriers to discharge with patient and caregiver   Arrange for needed discharge resources and transportation as appropriate   Identify discharge learning needs (meds, wound care, etc)  7/19/2025 0038 by Britni Maher RN  Outcome: Progressing  Flowsheets (Taken 7/18/2025 2000)  Discharge to home or other facility with appropriate resources:   Identify barriers to discharge with patient and caregiver   Identify discharge learning needs (meds, wound care, etc)     Problem: Safety - Adult  Goal: Free from fall injury  7/19/2025 1011 by Domi Schuler RN  Outcome: Not Progressing  Flowsheets (Taken 7/19/2025 0959)  Free From Fall Injury: Instruct family/caregiver on patient safety  7/19/2025 0038 by Britni Maher RN  Outcome: Progressing  Flowsheets (Taken 7/18/2025 2000)  Free From Fall Injury: Instruct family/caregiver on patient safety     Problem: ABCDS Injury Assessment  Goal: Absence of physical injury  7/19/2025 1011 by Domi Schuler RN  Outcome: Not Progressing  7/19/2025 0038 by Britni Maher RN  Outcome: Progressing  Flowsheets (Taken 7/18/2025 2000)  Absence of Physical Injury: Implement safety measures based on patient assessment     Problem: Skin/Tissue Integrity  Goal: Skin integrity remains

## 2025-07-19 NOTE — PROGRESS NOTES
NEUROSURGERY PROGRESS NOTE:   Admit Date: 7/14/2025  Subjective:   No acute overnight events.  This morning the patient is lethargic but awake alert and will nod try to answer questions.  He is not oriented to name place or location.    Objective:  /84   Pulse 83   Temp 99.7 °F (37.6 °C) (Axillary)   Resp 17   Ht 1.778 m (5' 10\")   Wt 93 kg (205 lb)   SpO2 93%   BMI 29.41 kg/m²   General: No acute distress  Awake, alert, not answering orientation questions but nods his head in response to questions  Eyes open spontaneously   PERRL tracks examiner  Dressing in place on lumbar drain exit site clean dry and intact  Patient moves all extremities and follows commands in all 4 extremities with greater than antigravity strength.    Assessment and Plan:   Azam May . 75 y.o. male who is now 5 Days Post-Op from placement of a lumbar drain for a lumbar drain trial to determine if the patient would benefit from permanent CSF diversion.  The patient now has clinical evidence concerning for meningitis given his CSF sample and clinical examination findings.  His exam seems most consistent with a global encephalopathy.  Should he fail to improve with IV antibiotics we will consider obtaining an MRI brain with and without contrast. The patient has a CT head without contrast performed on 7/18/2025 demonstrates no acute intracranial findings and stable appearance of ventriculomegaly without concern for transependymal flow.  Appreciate medicine and infectious disease comanagement will continue IV antibiotics for meningitis as outlined by infectious disease we will continue close monitoring on the neurosurgery floor.    Emmett Sin MD, FAANS, FCNS   Neurosurgeon  [facility]  [facility]   7/19/2025 at 10:29 AM

## 2025-07-20 LAB
ALBUMIN SERPL-MCNC: 3.3 G/DL (ref 3.2–4.6)
ALBUMIN/GLOB SERPL: 1.1 (ref 1–1.9)
ALP SERPL-CCNC: 53 U/L (ref 40–129)
ALT SERPL-CCNC: 24 U/L (ref 8–55)
ANION GAP SERPL CALC-SCNC: 12 MMOL/L (ref 7–16)
AST SERPL-CCNC: 25 U/L (ref 15–37)
BASOPHILS # BLD: 0.05 K/UL (ref 0–0.2)
BASOPHILS NFR BLD: 0.5 % (ref 0–2)
BILIRUB SERPL-MCNC: 1.1 MG/DL (ref 0–1.2)
BUN SERPL-MCNC: 29 MG/DL (ref 8–23)
CALCIUM SERPL-MCNC: 8.7 MG/DL (ref 8.8–10.2)
CHLORIDE SERPL-SCNC: 104 MMOL/L (ref 98–107)
CO2 SERPL-SCNC: 22 MMOL/L (ref 20–29)
CREAT SERPL-MCNC: 1.67 MG/DL (ref 0.8–1.3)
DIFFERENTIAL METHOD BLD: ABNORMAL
EOSINOPHIL # BLD: 0.03 K/UL (ref 0–0.8)
EOSINOPHIL NFR BLD: 0.3 % (ref 0.5–7.8)
ERYTHROCYTE [DISTWIDTH] IN BLOOD BY AUTOMATED COUNT: 14.6 % (ref 11.9–14.6)
GLOBULIN SER CALC-MCNC: 3.1 G/DL (ref 2.3–3.5)
GLUCOSE SERPL-MCNC: 128 MG/DL (ref 70–99)
HCT VFR BLD AUTO: 41.6 % (ref 41.1–50.3)
HGB BLD-MCNC: 14.1 G/DL (ref 13.6–17.2)
IMM GRANULOCYTES # BLD AUTO: 0.1 K/UL (ref 0–0.5)
IMM GRANULOCYTES NFR BLD AUTO: 1.1 % (ref 0–5)
LYMPHOCYTES # BLD: 0.53 K/UL (ref 0.5–4.6)
LYMPHOCYTES NFR BLD: 5.8 % (ref 13–44)
MCH RBC QN AUTO: 32.5 PG (ref 26.1–32.9)
MCHC RBC AUTO-ENTMCNC: 33.9 G/DL (ref 31.4–35)
MCV RBC AUTO: 95.9 FL (ref 82–102)
MONOCYTES # BLD: 0.88 K/UL (ref 0.1–1.3)
MONOCYTES NFR BLD: 9.6 % (ref 4–12)
NEUTS SEG # BLD: 7.56 K/UL (ref 1.7–8.2)
NEUTS SEG NFR BLD: 82.7 % (ref 43–78)
NRBC # BLD: 0 K/UL (ref 0–0.2)
PLATELET # BLD AUTO: 145 K/UL (ref 150–450)
PMV BLD AUTO: 11.1 FL (ref 9.4–12.3)
POTASSIUM SERPL-SCNC: 4.2 MMOL/L (ref 3.5–5.1)
PROT SERPL-MCNC: 6.4 G/DL (ref 6.3–8.2)
RBC # BLD AUTO: 4.34 M/UL (ref 4.23–5.6)
SODIUM SERPL-SCNC: 138 MMOL/L (ref 136–145)
VANCOMYCIN SERPL-MCNC: 10.4 UG/ML
WBC # BLD AUTO: 9.2 K/UL (ref 4.3–11.1)
WNV IGG SER QL IA: NEGATIVE
WNV IGM SER QL IA: NEGATIVE

## 2025-07-20 PROCEDURE — 2500000003 HC RX 250 WO HCPCS

## 2025-07-20 PROCEDURE — 1100000000 HC RM PRIVATE

## 2025-07-20 PROCEDURE — 80053 COMPREHEN METABOLIC PANEL: CPT

## 2025-07-20 PROCEDURE — 6360000002 HC RX W HCPCS: Performed by: INTERNAL MEDICINE

## 2025-07-20 PROCEDURE — 36415 COLL VENOUS BLD VENIPUNCTURE: CPT

## 2025-07-20 PROCEDURE — 99232 SBSQ HOSP IP/OBS MODERATE 35: CPT | Performed by: NEUROLOGICAL SURGERY

## 2025-07-20 PROCEDURE — 6360000002 HC RX W HCPCS: Performed by: NURSE PRACTITIONER

## 2025-07-20 PROCEDURE — 2580000003 HC RX 258: Performed by: NURSE PRACTITIONER

## 2025-07-20 PROCEDURE — 2580000003 HC RX 258: Performed by: INTERNAL MEDICINE

## 2025-07-20 PROCEDURE — 80202 ASSAY OF VANCOMYCIN: CPT

## 2025-07-20 PROCEDURE — 85025 COMPLETE CBC W/AUTO DIFF WBC: CPT

## 2025-07-20 PROCEDURE — 6370000000 HC RX 637 (ALT 250 FOR IP): Performed by: NEUROLOGICAL SURGERY

## 2025-07-20 RX ADMIN — MEROPENEM 2000 MG: 1 INJECTION INTRAVENOUS at 20:50

## 2025-07-20 RX ADMIN — SODIUM CHLORIDE, PRESERVATIVE FREE 5 ML: 5 INJECTION INTRAVENOUS at 20:52

## 2025-07-20 RX ADMIN — ACETAMINOPHEN 650 MG: 325 TABLET ORAL at 05:14

## 2025-07-20 RX ADMIN — ACETAMINOPHEN 650 MG: 325 TABLET ORAL at 20:47

## 2025-07-20 RX ADMIN — VANCOMYCIN HYDROCHLORIDE 1250 MG: 10 INJECTION, POWDER, LYOPHILIZED, FOR SOLUTION INTRAVENOUS at 12:24

## 2025-07-20 RX ADMIN — MEROPENEM 2000 MG: 1 INJECTION INTRAVENOUS at 09:06

## 2025-07-20 RX ADMIN — SODIUM CHLORIDE, PRESERVATIVE FREE 10 ML: 5 INJECTION INTRAVENOUS at 09:06

## 2025-07-20 ASSESSMENT — PAIN SCALES - GENERAL: PAINLEVEL_OUTOF10: 0

## 2025-07-20 NOTE — PLAN OF CARE
Problem: Pain  Goal: Verbalizes/displays adequate comfort level or baseline comfort level  7/19/2025 2328 by Britni Maher RN  Outcome: Progressing  Flowsheets (Taken 7/19/2025 1947)  Verbalizes/displays adequate comfort level or baseline comfort level:   Encourage patient to monitor pain and request assistance   Assess pain using appropriate pain scale  7/19/2025 1011 by Domi Schuler RN  Outcome: Not Progressing     Problem: Discharge Planning  Goal: Discharge to home or other facility with appropriate resources  7/19/2025 2328 by Britni Maher RN  Outcome: Progressing  Flowsheets (Taken 7/19/2025 1950)  Discharge to home or other facility with appropriate resources:   Identify barriers to discharge with patient and caregiver   Identify discharge learning needs (meds, wound care, etc)  7/19/2025 1011 by Domi Schuler RN  Outcome: Not Progressing  Flowsheets (Taken 7/19/2025 0959)  Discharge to home or other facility with appropriate resources:   Identify barriers to discharge with patient and caregiver   Arrange for needed discharge resources and transportation as appropriate   Identify discharge learning needs (meds, wound care, etc)     Problem: Safety - Adult  Goal: Free from fall injury  7/19/2025 2328 by Britni Maher RN  Outcome: Progressing  Flowsheets (Taken 7/19/2025 1950)  Free From Fall Injury: Instruct family/caregiver on patient safety  7/19/2025 1011 by Domi Schuler RN  Outcome: Not Progressing  Flowsheets (Taken 7/19/2025 0959)  Free From Fall Injury: Instruct family/caregiver on patient safety     Problem: ABCDS Injury Assessment  Goal: Absence of physical injury  7/19/2025 2328 by Britni Maher RN  Outcome: Progressing  Flowsheets (Taken 7/19/2025 1950)  Absence of Physical Injury: Implement safety measures based on patient assessment  7/19/2025 1011 by Domi Schuler RN  Outcome: Not Progressing     Problem: Skin/Tissue Integrity  Goal: Skin integrity remains

## 2025-07-20 NOTE — PROGRESS NOTES
Infectious Diseases Progress Note    Today's Date: 2025   Admit Date: 2025  : 1950    Impression     Post procedure meningitis, noted after lumbar drain placement-drain removed this am  Acute AMS, N/V, fever: AMS and N/V resolved but remains with sporadic fever  CSF send from lumbar drain prior to removal:   WBC 1250, 98% neutrophils, gluc 48, protein 172, cx without growth at just under 48 hours , GS negative. Meningitis PCR negative. He appears overall improved  Blood cx x 2  NGTD  NPH, admitted for lumbar drain trial--placed 25  Outpatient memory, gait, incontinence issues for 6-7 months    Impression   Continue Vancomycin pharmacy to dose  Continue Merrem to CNS dose/renal adjusted 2g IV Q12hrs  Follow cultures, fever and  mentation response  Unclear that this is drug fever: too early for that but not clear why still having fever, in that setting , recommend repeat MRI brain with contrast  ID following  Anti-infectives:   Vanc -  Meropenem -  Cefepime/Flagyl on     Subjective:   Interval History  Tm 101.7: WBC normal. He is awake and alert and reports feeling better today and no headache.     Patient is a 75 y.o. male Who has been followed by neurology for 6-7 months of changes running his memory, gait, incontinence.  MRI with concerns for normal pressure hydrocephalus.  He was admitted to have a trial of a lumbar drain to see if his symptoms would improve.  The drain was placed on 7/15/2025, there is some concern that the drain might have dislodged earlier this week.  This morning he woke up with acute confusion, fever as high as 102, nausea with emesis.  He has also been seen to have chills and rigors.  Blood cultures are pending, CSF was sent from his lumbar drain prior to discontinuation of the drain, cell counts noting WBC 1250, 98% neutrophils, glucose 48, protein 172, cultures pending, Gram stain without organisms.  Meningitis pathogen's PCR negative.  Patient has

## 2025-07-20 NOTE — PROGRESS NOTES
NEUROSURGERY PROGRESS NOTE:   Admit Date: 7/14/2025  Subjective:   No acute overnight events.  Patient underwent an MRI brain with and without contrast.  He is more conversant today but still has difficulty answering orientation questions with the delay    Objective:  /82   Pulse 70   Temp 98.2 °F (36.8 °C) (Oral)   Resp 17   Ht 1.778 m (5' 10\")   Wt 93 kg (205 lb)   SpO2 93%   BMI 29.41 kg/m²   General: No acute distress  Awake, alert, more conversant but still has difficulty answering the orientation questions such as what is his name and where is his location.  He did tell me he was in the hospital today but never answered his name question when asked if his name was Azam May he answered yes questions he did not his head in response to questions  Eyes open spontaneously   PERRL tracks examiner  Patient moves all extremities and follows commands in all 4 extremities with greater than antigravity strength.    Assessment and Plan:   Azam May Jr. 75 y.o. male who is now 6 Days Post-Op from placement of a lumbar drain for a lumbar drain trial to determine if the patient would benefit from permanent CSF diversion.  The patient now has clinical evidence concerning for meningitis given his CSF sample and clinical examination findings.  His exam seems most consistent with a global encephalopathy.  The patient underwent an MRI brain with and without contrast that demonstrates stable ventriculomegaly in comparison to prior CTs did not appear to be any enhancing loculated collections c concerning for abscess however the contrasted imaging is degraded by motion artifactual degradation.  No evidence of large vessel territory infarct no evidence of acute intracranial hemorrhage.Appreciate medicine and infectious disease comanagement will continue IV antibiotics for meningitis as outlined by infectious disease we will continue close monitoring on the neurosurgery floor.      Emmett Sin MD,

## 2025-07-20 NOTE — PROGRESS NOTES
VANCO DAILY FOLLOW UP NOTE  [facility]   Pharmacy Pharmacokinetic Monitoring Service - Vancomycin    Consulting Provider: Dr. Cuenca   Indication: CNS infection  Target Concentration: Goal AUC/JEANNE 400-600 mg*hr/L  Day of Therapy: 3  Additional Antimicrobials: meropenem    Pertinent Laboratory Values:   Wt Readings from Last 1 Encounters:   07/14/25 93 kg (205 lb)     Temp Readings from Last 1 Encounters:   07/20/25 98.2 °F (36.8 °C) (Oral)     Recent Labs     07/18/25  1144 07/20/25  0652   BUN 23  --    CREATININE 1.67*  --    WBC 9.7 9.2   PROCAL 0.23*  --    LACTA 1.0  --      Estimated Creatinine Clearance: 45 mL/min (A) (based on SCr of 1.67 mg/dL (H)).    Lab Results   Component Value Date/Time    VANCORANDOM 10.4 07/20/2025 06:52 AM       MRSA Nasal Swab: N/A. Non-respiratory infection    Assessment:  Date/Time Dose Concentration AUC   7/20 0652 1250 mg q24h 10.4 488   Note: Serum concentrations collected for AUC dosing may appear elevated if collected in close proximity to the dose administered, this is not necessarily an indication of toxicity    Plan:  Dosing recommendations based on Bayesian software  Continue vancomycin 1250 mg every 24 hours  Renal labs as indicated   Vancomycin concentrations will be ordered as clinically appropriate  Pharmacy will continue to monitor patient and adjust therapy as indicated    Thank you for the consult,  Seymour Onofre, PharmD, BCOP  Clinical Pharmacist  Contact [employer]

## 2025-07-20 NOTE — PROGRESS NOTES
Resident Progress Note   Admit Date:  2025  3:53 PM   Name:  Azam May Jr.   Age:  75 y.o.  Sex:  male  :  1950   MRN:  [id number]   Room:  [facility]/    Presenting Complaint: No chief complaint on file.    Reason(s) for Admission:      Hospital Course:   A 75-year-old male who has been following with neurology for almost 7-month presented to the hospital with memory, gait changes associated with urinary incontinence patient was admitted initially and there neurosurgery as the MRI concerns for new normal pressure hydrocephalus.  A trial of lumbar drain and was done placed on July 15, 2025 and concern of dislodgment was raised earlier this week. Yesterday the patient woke up with acute confusion associated with fever; nausea and vomiting.  Patient was reporting chills and rigor blood culture of 2 sets were sent and they are negative for 18 and 16 hours so far. CSF culture and Gram stain is also pending CSF routine is high of 172 and white cell count of 1250 with neutrophils of 98% patient was placed on IV vancomycin IV cefepime IV ampicillin and IV Flagyl.  ID team consulted for concern of meningitis versus encephalitis ampicillin and Flagyl were stopped and the switch to meropenem 2 g IV every 12 hours. CT head was concerning for ventriculomegaly and atherosclerosis. Chest x-ray was concerning for atelectasis versus pneumonia and showing small pleural effusion.     Subjective & 24-hour events:  Patient seen and examined today he is more alert more communicative, reports headaches however he was not able to specify the pain score.  Patient reports no neck pain nausea or vomiting. Denies any chest pain or shortness of breathHis vital is showing that he had a spike of fever of 101.7 at 4 AM and 100.8 yesterday at around 7 PM with maximum blood pressure during fever episodes systolic blood pressure of 147. His labs white cell count is normal and we still waiting on West Nile viral serology. MRI

## 2025-07-21 LAB
ALBUMIN SERPL-MCNC: 3.5 G/DL (ref 3.2–4.6)
ALBUMIN/GLOB SERPL: 1.3 (ref 1–1.9)
ALP SERPL-CCNC: 57 U/L (ref 40–129)
ALT SERPL-CCNC: 37 U/L (ref 8–55)
ANION GAP SERPL CALC-SCNC: 12 MMOL/L (ref 7–16)
AST SERPL-CCNC: 42 U/L (ref 15–37)
BASOPHILS # BLD: 0.05 K/UL (ref 0–0.2)
BASOPHILS NFR BLD: 0.6 % (ref 0–2)
BILIRUB SERPL-MCNC: 1.3 MG/DL (ref 0–1.2)
BUN SERPL-MCNC: 28 MG/DL (ref 8–23)
CALCIUM SERPL-MCNC: 8.9 MG/DL (ref 8.8–10.2)
CHLORIDE SERPL-SCNC: 104 MMOL/L (ref 98–107)
CO2 SERPL-SCNC: 23 MMOL/L (ref 20–29)
CREAT SERPL-MCNC: 1.48 MG/DL (ref 0.8–1.3)
CRP SERPL-MCNC: 2.2 MG/DL (ref 0–0.4)
DIFFERENTIAL METHOD BLD: ABNORMAL
EOSINOPHIL # BLD: 0.1 K/UL (ref 0–0.8)
EOSINOPHIL NFR BLD: 1.2 % (ref 0.5–7.8)
ERYTHROCYTE [DISTWIDTH] IN BLOOD BY AUTOMATED COUNT: 14.6 % (ref 11.9–14.6)
GLOBULIN SER CALC-MCNC: 2.8 G/DL (ref 2.3–3.5)
GLUCOSE SERPL-MCNC: 101 MG/DL (ref 70–99)
HCT VFR BLD AUTO: 44.4 % (ref 41.1–50.3)
HGB BLD-MCNC: 14.8 G/DL (ref 13.6–17.2)
IMM GRANULOCYTES # BLD AUTO: 0.09 K/UL (ref 0–0.5)
IMM GRANULOCYTES NFR BLD AUTO: 1.1 % (ref 0–5)
LYMPHOCYTES # BLD: 0.6 K/UL (ref 0.5–4.6)
LYMPHOCYTES NFR BLD: 7.5 % (ref 13–44)
MCH RBC QN AUTO: 32.4 PG (ref 26.1–32.9)
MCHC RBC AUTO-ENTMCNC: 33.3 G/DL (ref 31.4–35)
MCV RBC AUTO: 97.2 FL (ref 82–102)
MONOCYTES # BLD: 0.78 K/UL (ref 0.1–1.3)
MONOCYTES NFR BLD: 9.7 % (ref 4–12)
NEUTS SEG # BLD: 6.43 K/UL (ref 1.7–8.2)
NEUTS SEG NFR BLD: 79.9 % (ref 43–78)
NRBC # BLD: 0 K/UL (ref 0–0.2)
PLATELET # BLD AUTO: 139 K/UL (ref 150–450)
PMV BLD AUTO: 10.7 FL (ref 9.4–12.3)
POTASSIUM SERPL-SCNC: 4.3 MMOL/L (ref 3.5–5.1)
PROCALCITONIN SERPL-MCNC: 0.2 NG/ML (ref 0–0.1)
PROT SERPL-MCNC: 6.3 G/DL (ref 6.3–8.2)
RBC # BLD AUTO: 4.57 M/UL (ref 4.23–5.6)
SODIUM SERPL-SCNC: 139 MMOL/L (ref 136–145)
T PALLIDUM AB SER QL IA: NONREACTIVE
WBC # BLD AUTO: 8.1 K/UL (ref 4.3–11.1)

## 2025-07-21 PROCEDURE — 97535 SELF CARE MNGMENT TRAINING: CPT

## 2025-07-21 PROCEDURE — 6370000000 HC RX 637 (ALT 250 FOR IP): Performed by: NEUROLOGICAL SURGERY

## 2025-07-21 PROCEDURE — 97129 THER IVNTJ 1ST 15 MIN: CPT

## 2025-07-21 PROCEDURE — 36415 COLL VENOUS BLD VENIPUNCTURE: CPT

## 2025-07-21 PROCEDURE — 97530 THERAPEUTIC ACTIVITIES: CPT

## 2025-07-21 PROCEDURE — 85025 COMPLETE CBC W/AUTO DIFF WBC: CPT

## 2025-07-21 PROCEDURE — 2500000003 HC RX 250 WO HCPCS

## 2025-07-21 PROCEDURE — 1100000000 HC RM PRIVATE

## 2025-07-21 PROCEDURE — 6360000002 HC RX W HCPCS: Performed by: INTERNAL MEDICINE

## 2025-07-21 PROCEDURE — 2580000003 HC RX 258: Performed by: INTERNAL MEDICINE

## 2025-07-21 PROCEDURE — 87661 TRICHOMONAS VAGINALIS AMPLIF: CPT

## 2025-07-21 PROCEDURE — 2580000003 HC RX 258: Performed by: NURSE PRACTITIONER

## 2025-07-21 PROCEDURE — 87491 CHLMYD TRACH DNA AMP PROBE: CPT

## 2025-07-21 PROCEDURE — 80053 COMPREHEN METABOLIC PANEL: CPT

## 2025-07-21 PROCEDURE — 97164 PT RE-EVAL EST PLAN CARE: CPT

## 2025-07-21 PROCEDURE — 97168 OT RE-EVAL EST PLAN CARE: CPT

## 2025-07-21 PROCEDURE — 86780 TREPONEMA PALLIDUM: CPT

## 2025-07-21 PROCEDURE — 86140 C-REACTIVE PROTEIN: CPT

## 2025-07-21 PROCEDURE — 84145 PROCALCITONIN (PCT): CPT

## 2025-07-21 PROCEDURE — 6360000002 HC RX W HCPCS: Performed by: NURSE PRACTITIONER

## 2025-07-21 PROCEDURE — 99232 SBSQ HOSP IP/OBS MODERATE 35: CPT | Performed by: NURSE PRACTITIONER

## 2025-07-21 PROCEDURE — 97130 THER IVNTJ EA ADDL 15 MIN: CPT

## 2025-07-21 PROCEDURE — 97112 NEUROMUSCULAR REEDUCATION: CPT

## 2025-07-21 PROCEDURE — 87591 N.GONORRHOEAE DNA AMP PROB: CPT

## 2025-07-21 RX ADMIN — SODIUM CHLORIDE, PRESERVATIVE FREE 5 ML: 5 INJECTION INTRAVENOUS at 19:50

## 2025-07-21 RX ADMIN — MEROPENEM 2000 MG: 1 INJECTION INTRAVENOUS at 17:21

## 2025-07-21 RX ADMIN — MEROPENEM 2000 MG: 1 INJECTION INTRAVENOUS at 07:50

## 2025-07-21 RX ADMIN — SODIUM CHLORIDE, PRESERVATIVE FREE 5 ML: 5 INJECTION INTRAVENOUS at 07:47

## 2025-07-21 RX ADMIN — ACETAMINOPHEN 650 MG: 325 TABLET ORAL at 07:47

## 2025-07-21 RX ADMIN — VANCOMYCIN HYDROCHLORIDE 1250 MG: 10 INJECTION, POWDER, LYOPHILIZED, FOR SOLUTION INTRAVENOUS at 11:35

## 2025-07-21 ASSESSMENT — PAIN SCALES - GENERAL
PAINLEVEL_OUTOF10: 0
PAINLEVEL_OUTOF10: 0
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
## 2025-07-21 NOTE — PROGRESS NOTES
VANCO DAILY FOLLOW UP NOTE  [facility]   Pharmacy Pharmacokinetic Monitoring Service - Vancomycin    Consulting Provider: Dr. Cuenca   Indication: CNS infection  Target Concentration: Goal AUC/JEANNE 400-600 mg*hr/L  Day of Therapy: 4  Additional Antimicrobials: meropenem    Pertinent Laboratory Values:   Wt Readings from Last 1 Encounters:   07/14/25 93 kg (205 lb)     Temp Readings from Last 1 Encounters:   07/21/25 99.7 °F (37.6 °C) (Oral)     Recent Labs     07/20/25  0652 07/20/25  1531 07/21/25  0534   BUN  --  29* 28*   CREATININE  --  1.67* 1.48*   WBC 9.2  --  8.1   PROCAL  --   --  0.20*     Estimated Creatinine Clearance: 51 mL/min (A) (based on SCr of 1.48 mg/dL (H)).    Lab Results   Component Value Date/Time    VANCORANDOM 10.4 07/20/2025 06:52 AM     MRSA Nasal Swab: N/A. Non-respiratory infection    Assessment:  Date/Time Dose Concentration AUC   7/20 0652 1250 mg q24h 10.4 488   Note: Serum concentrations collected for AUC dosing may appear elevated if collected in close proximity to the dose administered, this is not necessarily an indication of toxicity    Plan:  Dosing recommendations based on Bayesian software  Continue vancomycin 1250 mg every 24 hours  Renal labs as indicated   Vancomycin concentrations will be ordered as clinically appropriate  Pharmacy will continue to monitor patient and adjust therapy as indicated    Thank you for the consult,  Chitra Salomon [facility]

## 2025-07-21 NOTE — PROGRESS NOTES
GOALS:  LTG: Patient will improve neuro-linguistic abilities to increase safety and awareness in functional living environment.   STG: Updated 2025  Patient will display orientation to aspects of place and time with 50% accuracy provided with min A.   Patient will answer problem solving questions related to daily activities with 60% accuracy provided with min A.   Patient will recall safety precautions with 60% accuracy provided with min A.   Patient will participate in ongoing cognitive-linguistic assessment as per NPH protocol. - Protocol halted due to change in medical status.     SPEECH LANGUAGE PATHOLOGY: COGNITIVE COMMUNICATION Daily Note #4    Acknowledge Order  I  Therapy Time  I   Charges     I  Rehab Caseload Tracker    NAME: Azam May Jr.  : 1950  MRN: [id number]    ADMISSION DATE: 2025  PRIMARY DIAGNOSIS: Essential hypertension    ICD-10: Treatment Diagnosis:  R41.841 Cognitive-Communication Deficit    RECOMMENDATIONS:   Cognitive- Communicative: Patient does exhibit deficits with attention, orientation, memory, and insight. Mild delay noted in all therapy tasks and frequent need for direction required for patient to participate at highest possible level. Wife at bedside and reports patient has improved regarding cognitive- communicative function compared to previous date though current level of function below baseline. Recommend speech therapy services during and post acute care.    Therapeutic Interventions: Patient/family education  Cognitive-linguistic treatment   Patient continues to require skilled intervention: Yes. Recommend ongoing speech therapy services during this hospitalization.   Anticipated Discharge Needs: Ongoing speech therapy is recommended at next level of care.      ASSESSMENT   Cognitive- Communicative: Patient exhibits moderate cognitive- communicative deficits which are further impacted by patient's reduced alertness. At current level of function,

## 2025-07-21 NOTE — PROGRESS NOTES
Resident Progress Note   Admit Date:  2025  3:53 PM   Name:  Azam May Jr.   Age:  75 y.o.  Sex:  male  :  1950   MRN:  [id number]   Room:  [facility]/    Presenting Complaint: No chief complaint on file.    Reason(s) for Admission: NPH (normal pressure hydrocephalus) (HCC) [G91.2]     Hospital Course:   A 75-year-old male who has been following with neurology for almost 7-month presented to the hospital with memory, gait changes associated with urinary incontinence patient was admitted initially and there neurosurgery as the MRI concerns for new normal pressure hydrocephalus. A trial of lumbar drain and was done placed on July 15, 2025 and concern of dislodgment was raised earlier this week. Yesterday the patient woke up with acute confusion associated with fever; nausea and vomiting. Patient was reporting chills and rigor blood culture of 2 sets were sent and they are negative for 18 and 16 hours so far. CSF culture and Gram stain is also pending CSF routine is high of 172 and white cell count of 1250 with neutrophils of 98% patient was placed on IV vancomycin IV cefepime IV ampicillin and IV Flagyl. ID team consulted for concern of meningitis versus encephalitis ampicillin and Flagyl were stopped and the switch to meropenem 2 g IV every 12 hours. CT head was concerning for ventriculomegaly and atherosclerosis. Chest x-ray was concerning for atelectasis versus pneumonia and showing small pleural effusion.     Subjective & 24-hour events:  Patient seen and examined today he is more alert and more communicative. No acute overnight events. Patient reports no neck pain nausea or vomiting. Denies any chest pain or shortness of breath. His fever has resolved but his blood pressure is elevated at 145/93. His labs white cell count is normal and West Nile viral serology was negative. MRI brain showed periventricular and subcortical white matter abnormality bilaterally and sequelae of microangiopathy. The  ondansetron (ZOFRAN) injection 4 mg  4 mg IntraVENous Q6H PRN    sodium chloride flush 0.9 % injection 5-40 mL  5-40 mL IntraVENous 2 times per day    sodium chloride flush 0.9 % injection 5-40 mL  5-40 mL IntraVENous PRN    0.9 % sodium chloride infusion   IntraVENous PRN       Signed:  WILDER MCDANIELS MD    Part of this note may have been written by using a voice dictation software.  The note has been proof read but may still contain some grammatical/other typographical errors.

## 2025-07-21 NOTE — PROGRESS NOTES
ACUTE PHYSICAL THERAPY GOALS: Reviewed  & Updated Upon Re-Assessment 7/21/25  (Developed with and agreed upon by patient and/or caregiver.)  (1.) Azam May Jr.  will move from supine to sit and sit to supine , scoot up and down, and roll side to side with MINIMAL ASSIST within 7 treatment day(s).    (2.) Azam May Jr. will transfer from bed to chair and chair to bed with MINIMAL ASSIST using the least restrictive device within 7 treatment day(s).    (3.) Azam May Jr. will ambulate with MINIMAL ASSIST for 100 feet with the least restrictive device within 7 treatment day(s).   (4.) Azam May Jr. will perform standing static and dynamic balance activities x 15 minutes with MINIMAL ASSIST to improve safety within 7 treatment day(s).  (5.) Azam May Jr. will perform therapeutic exercises x 20 min for HEP with SUPERVISION to improve strength, endurance, and functional mobility within 7 treatment day(s).     PHYSICAL THERAPY Daily Note, Re-evaluation, and PM  (Link to Caseload Tracking: PT Visit Days : 1  Acknowledge Orders  Time In/Out  PT Charge Capture  Rehab Caseload Tracker    Azam May Jr. is a 75 y.o. male   PRIMARY DIAGNOSIS: Essential hypertension  NPH (normal pressure hydrocephalus) (HCC) [G91.2]    7 Days Post-Op  Reason for Referral: Difficulty in walking, Not elsewhere classified (R26.2)  History of falling (Z91.81)  Dizziness and Giddiness (R42)  Inpatient: Payor: MEDICARE / Plan: MEDICARE PART A AND B / Product Type: *No Product type* /     ASSESSMENT:     REHAB RECOMMENDATIONS:   Recommendation to date pending progress:  Setting:  Rehab    Equipment:    To Be Determined     ASSESSMENT:    Mr. May presents resting in bed, supportive spouse at bedside. PT re-evaluation performed this date due to decline in mobility and function as pt spiked a fever and lumbar drain has been pulled - thus no longer  continuing lumbar drain protocol. PT plan of care  (Comments):   Orientation []  Oriented to person, place, and situation   Vision [x]     Hearing [x]     Cognition  []  Intermittently confused throughout conversation and with command following/task management. Decreased insight, recall, and safety awareness     MOBILITY: I Mod I S SBA CGA Min Mod Max Total  NT x2 Comments:   Bed Mobility    Rolling [] [] [] [] [] [] [] [x] [] [] [x]    Supine to Sit [] [] [] [] [] [] [] [x] [] [] [x] Rigid trunk throughout   Scooting [] [] [] [] [] [] [] [x] [x] [] [x]    Sit to Supine [] [] [] [] [] [] [] [x] [x] [] [x]    Transfers    Sit to Stand [] [] [] [] [] [] [] [x] [] [] [x] Multimodal cues for task initiation    Bed to Chair [] [] [] [] [] [] [] [] [] [x] []    Stand to Sit [] [] [] [] [] [] [] [x] [] [] [x] Multimodal cues for task initiation     [] [] [] [] [] [] [] [] [] [] []    I=Independent, Mod I=Modified Independent, S=Supervision, SBA=Standby Assistance, CGA=Contact Guard Assistance,   Min=Minimal Assistance, Mod=Moderate Assistance, Max=Maximal Assistance, Total=Total Assistance, NT=Not Tested    GAIT: I Mod I S SBA CGA Min Mod Max Total  NT x2 Comments:   Level of Assistance [] [] [] [] [] [] [] [] [] [x] []    Distance   feet    DME Gait Belt    Gait Quality N/A    Weightbearing Status Restrictions/Precautions  Restrictions/Precautions: Fall Risk, Bed Alarm    Stairs      I=Independent, Mod I=Modified Independent, S=Supervision, SBA=Standby Assistance, CGA=Contact Guard Assistance,   Min=Minimal Assistance, Mod=Moderate Assistance, Max=Maximal Assistance, Total=Total Assistance, NT=Not Tested    PLAN:   FREQUENCY AND DURATION: 3 times/week for duration of hospital stay or until stated goals are met, whichever comes first.    THERAPY PROGNOSIS: Good    PROBLEM LIST:   (Skilled intervention is medically necessary to address:)  Decreased ADL/Functional Activities  Decreased Activity Tolerance  Decreased AROM/PROM  Decreased Balance  Decreased Cognition  Decreased

## 2025-07-21 NOTE — PROGRESS NOTES
[facility]    Assessment: S/P lumbar drain trial with post operative infection    POD#7    Plan:  - Patient developed some form of postprocedural infection likely due to the dismantling of his drain  - As of 8 AM this morning all of the cultures were still pending with no growth at that time noted  - Will continue to let ID and medicine manage antibiotics and infection.    Subjective:    Objective:  Alert and oriented to self and place but not month  DONALD  Low grade fever  VSS  Eyes open spontaneously  Full motor strength    Patient Vitals for the past 12 hrs:   Temp Pulse Resp BP SpO2   07/21/25 0743 99.7 °F (37.6 °C) 81 19 (!) 145/93 95 %        Recent Results (from the past 24 hours)   Comprehensive Metabolic Panel    Collection Time: 07/20/25  3:31 PM   Result Value Ref Range    Sodium 138 136 - 145 mmol/L    Potassium 4.2 3.5 - 5.1 mmol/L    Chloride 104 98 - 107 mmol/L    CO2 22 20 - 29 mmol/L    Anion Gap 12 7 - 16 mmol/L    Glucose 128 (H) 70 - 99 mg/dL    BUN 29 (H) 8 - 23 MG/DL    Creatinine 1.67 (H) 0.80 - 1.30 MG/DL    Est, Glom Filt Rate 43 (L) >60 ml/min/1.73m2    Calcium 8.7 (L) 8.8 - 10.2 MG/DL    Total Bilirubin 1.1 0.0 - 1.2 MG/DL    ALT 24 8 - 55 U/L    AST 25 15 - 37 U/L    Alk Phosphatase 53 40 - 129 U/L    Total Protein 6.4 6.3 - 8.2 g/dL    Albumin 3.3 3.2 - 4.6 g/dL    Globulin 3.1 2.3 - 3.5 g/dL    Albumin/Globulin Ratio 1.1 1.0 - 1.9     CBC with Auto Differential    Collection Time: 07/21/25  5:34 AM   Result Value Ref Range    WBC 8.1 4.3 - 11.1 K/uL    RBC 4.57 4.23 - 5.6 M/uL    Hemoglobin 14.8 13.6 - 17.2 g/dL    Hematocrit 44.4 41.1 - 50.3 %    MCV 97.2 82 - 102 FL    MCH 32.4 26.1 - 32.9 PG    MCHC 33.3 31.4 - 35.0 g/dL    RDW 14.6 11.9 - 14.6 %    Platelets 139 (L) 150 - 450 K/uL    MPV 10.7 9.4 - 12.3 FL    nRBC 0.00 0.0 - 0.2 K/uL    Differential Type AUTOMATED      Neutrophils % 79.9 (H) 43.0 - 78.0 %    Lymphocytes % 7.5 (L) 13.0 - 44.0 %

## 2025-07-21 NOTE — CARE COORDINATION
RN CM in room, patient sitting up in bed, alert, still with confusion. Wife present. DC remains TBD waiting on lumbar cultures. Intramed made aware of patient with I&D following incase there are any home abx needs. Did discuss with patient considering home health instead of OP therapy. They will think about it and RN CM will continue to follow for DC needs.

## 2025-07-21 NOTE — PROGRESS NOTES
ACUTE OCCUPATIONAL THERAPY GOALS:   (Developed with and agreed upon by patient and/or caregiver.) GOALS UPDATED 7/21/25  Patient will complete lower body dressing with  MINIMAL ASSISTANCE. GOAL MODIFIED   Patient will complete functional transfers with  MODERATE ASSISTANCE. GOAL MODIFIED    DISCONTINUE GOAL  DISCONTINUE GOAL     New goals added 7/21:   5. Patient will complete self feeding and grooming with setup.   6. Patient will complete toileting with moderate assistance.   Timeframe: 7 visits     OCCUPATIONAL THERAPY Daily Note and Re-evaluation       OT Visit Days: 1  Acknowledge Orders  Time  OT Charge Capture  Rehab Caseload Tracker      Azam May Jr. is a 75 y.o. male   PRIMARY DIAGNOSIS: Essential hypertension  NPH (normal pressure hydrocephalus) ([facility]) [G91.2]    7 Days Post-Op  Reason for Referral: Other lack of cordination (R27.8)  Inpatient: Payor: MEDICARE / Plan: MEDICARE PART A AND B / Product Type: *No Product type* /     ASSESSMENT:     REHAB RECOMMENDATIONS:   Recommendation to date pending progress:  Setting:  REHAB    Equipment:    To Be Determined     ASSESSMENT:  Mr. May is a 76 y/o M admitted for lumbar drain trial with Dr. Brown. Patient has 6 month history of shuffling gait, memory issues, & urinary incontinence. Lives with spouse & adult son who assist with medications & finances, but otherwise patient is fairly independent with ADLs & IADLs. Reports one recent fall, but admits to \"furniture walking.\" He has a cane & a walker but does not use them. He admits to being fairly sedentary spending most of his day watching TV. He still drives, but rarely. He is R hand dominant and admits to difficulty completing tasks such as throwing a ball.     7/21: Seen today for re-evaluation following medical decline on Friday. LP drain pulled & trial terminated. Patient has had a significant physical and cognitive decline since last session on Thursday. He now requires maximal assistance    Co-Treatment between OT & PT necessary due to patient's decreased overall endurance/tolerance levels, as well as need for high level skilled assistance to complete functional transfers/mobility and functional tasks  Neuromuscular Re-education (10 Minutes): Patient participated in neuromuscular re-education including postural training and sitting balance activity   with maximal verbal cues, tactile cues, and visual cues to improve standing balance, proprioception, and posture in order to prepare for functional task and increase safety awareness.   Self Care (30 minutes): Patient participated in toileting, upper body dressing, lower body dressing, and bed mobility in supported sitting with maximal verbal and tactile cueing to attend to task and sequence task appropriately. The patient and family was educated on role of occupational therapy and strategies to improve safety and patient will need reinforcement at next session.     TREATMENT GRID:  N/A    AFTER TREATMENT PRECAUTIONS: Alarm Activated, Bed, Call light within reach, Needs within reach, RN notified, and Visitors at bedside    INTERDISCIPLINARY COLLABORATION:  RN/ PCT, PT/ PTA, OT/ PALACIOS, and RN Case Manager/      EDUCATION:  OT educated patient  on role of occupational therapy and plan of care and memory strategies. Patient will need continued education at next session.         TOTAL TREATMENT DURATION AND TIME:  Time In: 1409  Time Out: 1448  Minutes: 39    Kay Camacho OT

## 2025-07-22 ENCOUNTER — APPOINTMENT (OUTPATIENT)
Dept: CT IMAGING | Age: 75
End: 2025-07-22
Attending: NEUROLOGICAL SURGERY
Payer: MEDICARE

## 2025-07-22 LAB
ALBUMIN SERPL-MCNC: 3.2 G/DL (ref 3.2–4.6)
ALBUMIN SERPL-MCNC: 3.3 G/DL (ref 3.2–4.6)
ALBUMIN/GLOB SERPL: 1.1 (ref 1–1.9)
ALBUMIN/GLOB SERPL: 1.2 (ref 1–1.9)
ALP SERPL-CCNC: 58 U/L (ref 40–129)
ALP SERPL-CCNC: 60 U/L (ref 40–129)
ALT SERPL-CCNC: 43 U/L (ref 8–55)
ALT SERPL-CCNC: 46 U/L (ref 8–55)
AMMONIA PLAS-SCNC: 44 UMOL/L (ref 16–60)
ANION GAP SERPL CALC-SCNC: 11 MMOL/L (ref 7–16)
ANION GAP SERPL CALC-SCNC: 12 MMOL/L (ref 7–16)
AST SERPL-CCNC: 30 U/L (ref 15–37)
AST SERPL-CCNC: 41 U/L (ref 15–37)
BASE EXCESS BLDV CALC-SCNC: 3.8 MMOL/L
BASOPHILS # BLD: 0.06 K/UL (ref 0–0.2)
BASOPHILS # BLD: 0.06 K/UL (ref 0–0.2)
BASOPHILS NFR BLD: 0.8 % (ref 0–2)
BASOPHILS NFR BLD: 0.8 % (ref 0–2)
BILIRUB SERPL-MCNC: 0.9 MG/DL (ref 0–1.2)
BILIRUB SERPL-MCNC: 1.2 MG/DL (ref 0–1.2)
BUN SERPL-MCNC: 23 MG/DL (ref 8–23)
BUN SERPL-MCNC: 25 MG/DL (ref 8–23)
CALCIUM SERPL-MCNC: 8.8 MG/DL (ref 8.8–10.2)
CALCIUM SERPL-MCNC: 8.9 MG/DL (ref 8.8–10.2)
CHLORIDE SERPL-SCNC: 103 MMOL/L (ref 98–107)
CHLORIDE SERPL-SCNC: 105 MMOL/L (ref 98–107)
CO2 SERPL-SCNC: 23 MMOL/L (ref 20–29)
CO2 SERPL-SCNC: 24 MMOL/L (ref 20–29)
CREAT SERPL-MCNC: 1.36 MG/DL (ref 0.8–1.3)
CREAT SERPL-MCNC: 1.36 MG/DL (ref 0.8–1.3)
CRP SERPL-MCNC: 2.1 MG/DL (ref 0–0.4)
DIFFERENTIAL METHOD BLD: ABNORMAL
DIFFERENTIAL METHOD BLD: ABNORMAL
EOSINOPHIL # BLD: 0.13 K/UL (ref 0–0.8)
EOSINOPHIL # BLD: 0.2 K/UL (ref 0–0.8)
EOSINOPHIL NFR BLD: 1.7 % (ref 0.5–7.8)
EOSINOPHIL NFR BLD: 2.7 % (ref 0.5–7.8)
ERYTHROCYTE [DISTWIDTH] IN BLOOD BY AUTOMATED COUNT: 14.3 % (ref 11.9–14.6)
ERYTHROCYTE [DISTWIDTH] IN BLOOD BY AUTOMATED COUNT: 14.3 % (ref 11.9–14.6)
FOLATE SERPL-MCNC: 7.5 NG/ML (ref 3.1–17.5)
GAS FLOW.O2 O2 DELIVERY SYS: ABNORMAL
GLOBULIN SER CALC-MCNC: 2.7 G/DL (ref 2.3–3.5)
GLOBULIN SER CALC-MCNC: 2.9 G/DL (ref 2.3–3.5)
GLUCOSE SERPL-MCNC: 110 MG/DL (ref 70–99)
GLUCOSE SERPL-MCNC: 110 MG/DL (ref 70–99)
HCO3 BLDV-SCNC: 28.6 MMOL/L (ref 22–29)
HCT VFR BLD AUTO: 42.8 % (ref 41.1–50.3)
HCT VFR BLD AUTO: 44.2 % (ref 41.1–50.3)
HGB BLD-MCNC: 14.3 G/DL (ref 13.6–17.2)
HGB BLD-MCNC: 14.7 G/DL (ref 13.6–17.2)
IMM GRANULOCYTES # BLD AUTO: 0.06 K/UL (ref 0–0.5)
IMM GRANULOCYTES # BLD AUTO: 0.07 K/UL (ref 0–0.5)
IMM GRANULOCYTES NFR BLD AUTO: 0.8 % (ref 0–5)
IMM GRANULOCYTES NFR BLD AUTO: 0.9 % (ref 0–5)
LYMPHOCYTES # BLD: 0.53 K/UL (ref 0.5–4.6)
LYMPHOCYTES # BLD: 0.6 K/UL (ref 0.5–4.6)
LYMPHOCYTES NFR BLD: 7 % (ref 13–44)
LYMPHOCYTES NFR BLD: 8 % (ref 13–44)
Lab: NORMAL
Lab: NORMAL
MAGNESIUM SERPL-MCNC: 2.5 MG/DL (ref 1.8–2.4)
MCH RBC QN AUTO: 31.9 PG (ref 26.1–32.9)
MCH RBC QN AUTO: 32.2 PG (ref 26.1–32.9)
MCHC RBC AUTO-ENTMCNC: 33.3 G/DL (ref 31.4–35)
MCHC RBC AUTO-ENTMCNC: 33.4 G/DL (ref 31.4–35)
MCV RBC AUTO: 95.9 FL (ref 82–102)
MCV RBC AUTO: 96.4 FL (ref 82–102)
MONOCYTES # BLD: 0.93 K/UL (ref 0.1–1.3)
MONOCYTES # BLD: 1.03 K/UL (ref 0.1–1.3)
MONOCYTES NFR BLD: 12.3 % (ref 4–12)
MONOCYTES NFR BLD: 13.7 % (ref 4–12)
NEUTS SEG # BLD: 5.57 K/UL (ref 1.7–8.2)
NEUTS SEG # BLD: 5.88 K/UL (ref 1.7–8.2)
NEUTS SEG NFR BLD: 73.9 % (ref 43–78)
NEUTS SEG NFR BLD: 77.4 % (ref 43–78)
NRBC # BLD: 0 K/UL (ref 0–0.2)
NRBC # BLD: 0 K/UL (ref 0–0.2)
O2/TOTAL GAS SETTING VFR VENT: 21 %
PCO2 BLDV: 42.6 MMHG (ref 41–51)
PH BLDV: 7.44 (ref 7.32–7.42)
PLATELET # BLD AUTO: 153 K/UL (ref 150–450)
PLATELET # BLD AUTO: 155 K/UL (ref 150–450)
PMV BLD AUTO: 10.7 FL (ref 9.4–12.3)
PMV BLD AUTO: 11 FL (ref 9.4–12.3)
PO2 BLDV: 35 MMHG
POTASSIUM SERPL-SCNC: 4 MMOL/L (ref 3.5–5.1)
POTASSIUM SERPL-SCNC: 4.5 MMOL/L (ref 3.5–5.1)
PROCALCITONIN SERPL-MCNC: 0.2 NG/ML (ref 0–0.1)
PROT SERPL-MCNC: 6 G/DL (ref 6.3–8.2)
PROT SERPL-MCNC: 6.1 G/DL (ref 6.3–8.2)
RBC # BLD AUTO: 4.44 M/UL (ref 4.23–5.6)
RBC # BLD AUTO: 4.61 M/UL (ref 4.23–5.6)
REFERENCE LAB: NORMAL
SAO2 % BLDV: 68.5 % (ref 65–88)
SERVICE CMNT-IMP: ABNORMAL
SODIUM SERPL-SCNC: 138 MMOL/L (ref 136–145)
SODIUM SERPL-SCNC: 140 MMOL/L (ref 136–145)
SPECIMEN TYPE: ABNORMAL
T PALLIDUM AB SER QL IA: NONREACTIVE
TSH, 3RD GENERATION: 1.08 UIU/ML (ref 0.27–4.2)
VANCOMYCIN SERPL-MCNC: 11.4 UG/ML
VIT B12 SERPL-MCNC: 194 PG/ML (ref 193–986)
WBC # BLD AUTO: 7.5 K/UL (ref 4.3–11.1)
WBC # BLD AUTO: 7.6 K/UL (ref 4.3–11.1)

## 2025-07-22 PROCEDURE — 83735 ASSAY OF MAGNESIUM: CPT

## 2025-07-22 PROCEDURE — 2500000003 HC RX 250 WO HCPCS

## 2025-07-22 PROCEDURE — 84145 PROCALCITONIN (PCT): CPT

## 2025-07-22 PROCEDURE — 6360000002 HC RX W HCPCS: Performed by: INTERNAL MEDICINE

## 2025-07-22 PROCEDURE — 82746 ASSAY OF FOLIC ACID SERUM: CPT

## 2025-07-22 PROCEDURE — 80053 COMPREHEN METABOLIC PANEL: CPT

## 2025-07-22 PROCEDURE — 87327 CRYPTOCOCCUS NEOFORM AG IA: CPT

## 2025-07-22 PROCEDURE — 2580000003 HC RX 258: Performed by: INTERNAL MEDICINE

## 2025-07-22 PROCEDURE — 6370000000 HC RX 637 (ALT 250 FOR IP): Performed by: NEUROLOGICAL SURGERY

## 2025-07-22 PROCEDURE — 6360000002 HC RX W HCPCS

## 2025-07-22 PROCEDURE — 82803 BLOOD GASES ANY COMBINATION: CPT

## 2025-07-22 PROCEDURE — 36415 COLL VENOUS BLD VENIPUNCTURE: CPT

## 2025-07-22 PROCEDURE — 70450 CT HEAD/BRAIN W/O DYE: CPT

## 2025-07-22 PROCEDURE — 86780 TREPONEMA PALLIDUM: CPT

## 2025-07-22 PROCEDURE — 6360000002 HC RX W HCPCS: Performed by: NURSE PRACTITIONER

## 2025-07-22 PROCEDURE — 82607 VITAMIN B-12: CPT

## 2025-07-22 PROCEDURE — 80202 ASSAY OF VANCOMYCIN: CPT

## 2025-07-22 PROCEDURE — 97112 NEUROMUSCULAR REEDUCATION: CPT

## 2025-07-22 PROCEDURE — 83921 ORGANIC ACID SINGLE QUANT: CPT

## 2025-07-22 PROCEDURE — 82140 ASSAY OF AMMONIA: CPT

## 2025-07-22 PROCEDURE — 84443 ASSAY THYROID STIM HORMONE: CPT

## 2025-07-22 PROCEDURE — 86140 C-REACTIVE PROTEIN: CPT

## 2025-07-22 PROCEDURE — 1100000000 HC RM PRIVATE

## 2025-07-22 PROCEDURE — 2580000003 HC RX 258: Performed by: NURSE PRACTITIONER

## 2025-07-22 PROCEDURE — 99232 SBSQ HOSP IP/OBS MODERATE 35: CPT | Performed by: NURSE PRACTITIONER

## 2025-07-22 PROCEDURE — 85025 COMPLETE CBC W/AUTO DIFF WBC: CPT

## 2025-07-22 RX ADMIN — MEROPENEM 2000 MG: 1 INJECTION INTRAVENOUS at 00:26

## 2025-07-22 RX ADMIN — MEROPENEM 2000 MG: 1 INJECTION INTRAVENOUS at 17:39

## 2025-07-22 RX ADMIN — MEROPENEM 2000 MG: 1 INJECTION INTRAVENOUS at 07:46

## 2025-07-22 RX ADMIN — VANCOMYCIN HYDROCHLORIDE 1250 MG: 10 INJECTION, POWDER, LYOPHILIZED, FOR SOLUTION INTRAVENOUS at 11:25

## 2025-07-22 RX ADMIN — ONDANSETRON 4 MG: 2 INJECTION, SOLUTION INTRAMUSCULAR; INTRAVENOUS at 17:38

## 2025-07-22 RX ADMIN — ACETAMINOPHEN 650 MG: 325 TABLET ORAL at 20:10

## 2025-07-22 RX ADMIN — Medication 12 MG: at 20:10

## 2025-07-22 RX ADMIN — SODIUM CHLORIDE, PRESERVATIVE FREE 5 ML: 5 INJECTION INTRAVENOUS at 07:46

## 2025-07-22 ASSESSMENT — PAIN DESCRIPTION - ONSET: ONSET: SUDDEN

## 2025-07-22 ASSESSMENT — PAIN SCALES - GENERAL
PAINLEVEL_OUTOF10: 3
PAINLEVEL_OUTOF10: 0
PAINLEVEL_OUTOF10: 0

## 2025-07-22 ASSESSMENT — PAIN DESCRIPTION - DESCRIPTORS: DESCRIPTORS: ACHING;DISCOMFORT

## 2025-07-22 ASSESSMENT — PAIN DESCRIPTION - PAIN TYPE: TYPE: ACUTE PAIN

## 2025-07-22 ASSESSMENT — PAIN DESCRIPTION - LOCATION: LOCATION: HEAD;LEG

## 2025-07-22 ASSESSMENT — PAIN DESCRIPTION - FREQUENCY: FREQUENCY: INTERMITTENT

## 2025-07-22 ASSESSMENT — PAIN DESCRIPTION - ORIENTATION: ORIENTATION: RIGHT;LEFT

## 2025-07-22 ASSESSMENT — PAIN - FUNCTIONAL ASSESSMENT: PAIN_FUNCTIONAL_ASSESSMENT: PREVENTS OR INTERFERES SOME ACTIVE ACTIVITIES AND ADLS

## 2025-07-22 NOTE — PROGRESS NOTES
Infectious Diseases Progress Note    Today's Date: 2025   Admit Date: 2025  : 1950    Impression     Post procedure meningitis, noted after lumbar drain placement-drain removed this am  Acute AMS, N/V, fever: AMS and N/V resolved but remains with sporadic fever  CSF send from lumbar drain prior to removal:   WBC 1250, 98% neutrophils, gluc 48, protein 172, Cx with NGTD.  Meningitis PCR negative.  Blood cx x 2  NGTD  Syphilis screen non-reactive   Normal pressure hydrocephalus    Outpatient memory, gait, incontinence issues for 6-7 months.  Admitted for lumbar drain trial--placed 25 but now removed in setting above.    CKD  Can impact antibiotic dosing  Fever in setting above:  Fever curve appears to be improving.   Scrotal tenderness/pain , which the patient denies today   History of prostate cancer     Plan   Continue Vancomycin pharmacy to dose and Merrem 2 grams Q 8 hours.    Follow renal function and pharmacy should renally dose adjust abx therapy based on renal function.    Follow final blood and CSF culture results, chlamydia/gonorrhea screen   Continue to trend fever curve, which currently appears to be improving  Case and plan above discussed with Dr. Huffman.    Anti-infectives:   Vanc -  Meropenem -  Cefepime/Flagyl on     Subjective:   Interval History  Patient seen eating breakfast.  Pt remains oriented to person, place, and year but is unable to elaborate on the events that contributed to current hospitalization or events that have occurred during this hospitalization.  Patient has no complaints today.  Denies fever, chills, sweats, pain including scrotal pain/tenderness.         No Known Allergies     Objective:     Visit Vitals  /82   Pulse 69   Temp 98.4 °F (36.9 °C) (Oral)   Resp 16   Ht 1.778 m (5' 10\")   Wt 93 kg (205 lb)   SpO2 100%   BMI 29.41 kg/m²     Temp (24hrs), Av.1 °F (37.3 °C), Min:98.4 °F (36.9 °C), Max:99.5 °F (37.5 °C)     No

## 2025-07-22 NOTE — PROGRESS NOTES
[facility]    Assessment: S/P lumbar drain trial with post operative infection    POD#8    Plan:  - Patient developed some form of postprocedural infection likely due to the dismantling of his drain  - Culture still with no growth  -Patient is cleared to discharge home from a neurosurgical perspective whenever infectious diseases and medicine feels patient is ready  - Will continue to let ID and medicine manage antibiotics and infection.    Subjective:    Objective:  Alert and oriented to self and place but not month  DONALD  Low grade fever  VSS  Eyes open spontaneously  Full motor strength    Patient Vitals for the past 12 hrs:   Temp Pulse Resp BP SpO2   07/22/25 0716 98.4 °F (36.9 °C) 69 16 135/82 100 %   07/22/25 0331 99 °F (37.2 °C) 81 18 (!) 137/91 95 %        Recent Results (from the past 24 hours)   CBC with Auto Differential    Collection Time: 07/22/25  6:28 AM   Result Value Ref Range    WBC 7.6 4.3 - 11.1 K/uL    RBC 4.61 4.23 - 5.6 M/uL    Hemoglobin 14.7 13.6 - 17.2 g/dL    Hematocrit 44.2 41.1 - 50.3 %    MCV 95.9 82 - 102 FL    MCH 31.9 26.1 - 32.9 PG    MCHC 33.3 31.4 - 35.0 g/dL    RDW 14.3 11.9 - 14.6 %    Platelets 155 150 - 450 K/uL    MPV 10.7 9.4 - 12.3 FL    nRBC 0.00 0.0 - 0.2 K/uL    Differential Type AUTOMATED      Neutrophils % 77.4 43.0 - 78.0 %    Lymphocytes % 7.0 (L) 13.0 - 44.0 %    Monocytes % 12.3 (H) 4.0 - 12.0 %    Eosinophils % 1.7 0.5 - 7.8 %    Basophils % 0.8 0.0 - 2.0 %    Immature Granulocytes % 0.8 0.0 - 5.0 %    Neutrophils Absolute 5.88 1.70 - 8.20 K/UL    Lymphocytes Absolute 0.53 0.50 - 4.60 K/UL    Monocytes Absolute 0.93 0.10 - 1.30 K/UL    Eosinophils Absolute 0.13 0.00 - 0.80 K/UL    Basophils Absolute 0.06 0.00 - 0.20 K/UL    Immature Granulocytes Absolute 0.06 0.0 - 0.5 K/UL   Comprehensive Metabolic Panel    Collection Time: 07/22/25  6:28 AM   Result Value Ref Range    Sodium 138 136 - 145 mmol/L    Potassium 4.5 3.5 - 5.1 mmol/L

## 2025-07-22 NOTE — PROGRESS NOTES
SPEECH LANGUAGE PATHOLOGY: ATTEMPT     NAME: Azam May Jr.  : 1950  MRN: [id number]    ADMISSION DATE: 2025  PRIMARY DIAGNOSIS: Essential hypertension    Patient resting after PT/OT services during PM. Speech therapy services deferred to allow patient time to rest. Speech therapy will follow up and treat, as patient is able to participate.     EILEEN EARLY, CARLA  2025 3:42 PM

## 2025-07-22 NOTE — PROGRESS NOTES
ACUTE PHYSICAL THERAPY GOALS: Reviewed  & Updated Upon Re-Assessment 7/21/25  (Developed with and agreed upon by patient and/or caregiver.)  (1.) Azam May Jr.  will move from supine to sit and sit to supine , scoot up and down, and roll side to side with MINIMAL ASSIST within 7 treatment day(s).    (2.) Azam May Jr. will transfer from bed to chair and chair to bed with MINIMAL ASSIST using the least restrictive device within 7 treatment day(s).    (3.) Azam May Jr. will ambulate with MINIMAL ASSIST for 100 feet with the least restrictive device within 7 treatment day(s).   (4.) Azam May Jr. will perform standing static and dynamic balance activities x 15 minutes with MINIMAL ASSIST to improve safety within 7 treatment day(s).  (5.) Azam May Jr. will perform therapeutic exercises x 20 min for HEP with SUPERVISION to improve strength, endurance, and functional mobility within 7 treatment day(s).     PHYSICAL THERAPY: Daily Note PM   (Link to Caseload Tracking: PT Visit Days : 2  Time In/Out PT Charge Capture  Rehab Caseload Tracker  Orders    Azam May Jr. is a 75 y.o. male   PRIMARY DIAGNOSIS: Essential hypertension  NPH (normal pressure hydrocephalus) (HCC) [G91.2]    8 Days Post-Op  Inpatient: Payor: MEDICARE / Plan: MEDICARE PART A AND B / Product Type: *No Product type* /     ASSESSMENT:     REHAB RECOMMENDATIONS:   Recommendation to date pending progress:  Setting:  Rehab    Equipment:    To Be Determined     ASSESSMENT:    Mr. May presents resting in bed, supportive spouse at bedside. PT facilitated  neuromuscular reeducation activities including supine > sit, sitting balance activities with facilitation of midline orientation as pt with significant tendency to push/pull to R, resistant to correction and appears fearful of sitting/maintaining at midline. Also difficulty due to extensor tone in trunk to achieve neutral flexion position edge of

## 2025-07-22 NOTE — PROGRESS NOTES
Resident Progress Note   Admit Date:  2025  3:53 PM   Name:  Azam May Jr.   Age:  75 y.o.  Sex:  male  :  1950   MRN:  [id number]   Room:  [facility]/    Presenting Complaint: No chief complaint on file.    Reason(s) for Admission: NPH (normal pressure hydrocephalus) (HCC) [G91.2]     Hospital Course:   A 75-year-old male who has been following with neurology for almost 7-month presented to the hospital with memory, gait changes associated with urinary incontinence. Patient was admitted initially by neurosurgery as the MRI was concerning for new normal pressure hydrocephalus. A trial of lumbar drain was done placed on July 15, 2025 and concern of dislodgment was raised earlier this week. Yesterday the patient woke up with acute confusion associated with fever; nausea and vomiting. Patient was reporting chills and rigor blood culture of 2 sets were sent and they are negative for 18 and 16 hours so far. CSF culture and Gram stain is also pending CSF routine is high of 172 and white cell count of 1250 with neutrophils of 98% patient was placed on IV vancomycin IV cefepime IV ampicillin and IV Flagyl. ID team consulted for concern of meningitis versus encephalitis ampicillin and Flagyl were stopped and the switch to meropenem 2 g IV every 12 hours. CT head was concerning for ventriculomegaly and atherosclerosis. Chest x-ray was concerning for atelectasis versus pneumonia and showing small pleural effusion.     Subjective & 24-hour events:  Patient seen and examined today, he appears confused with suspected delirium. No acute overnight events. Patient reports no neck pain, nausea or vomiting. Denies any chest pain or shortness of breath. His fever has resolved and his BP is stable at 135/82.His labs white cell count is normal and West Nile viral serology was negative. MRI brain showed periventricular and subcortical white matter abnormality bilaterally and sequelae of microangiopathy. The x-ray of the

## 2025-07-22 NOTE — CONSULTS
Neurology Consult Note       History:   75-year-old male with preceding cognitive impairment, shuffling gait initially admitted for NPH workup, elective lumbar drain.  Unfortunately he did not respond to the trial, and course was complicated by bacterial meningitis, now on meropenem.  He has had fluctuating confusion since the past few days.  Wife noticed that after recent herniation surgery, he also was more confused for a few days afterwards, and slowly resolved.      Exam: Pertinent positives and negatives include:  Awake, alert.  Difficulty sustaining attention.  No spontaneous intrusive saccades or nystagmus.  No facial asymmetry. Moving all extremities spontaneously.  No abnormal involuntary movements. Sensation is intact to light touch.       Imaging and review of data:   CT head without recent changes      Assessment and Plan:   75-year-old male with multifactorial hospital delirium in the setting of pre-existing cognitive impairment, bacterial meningitis and now on meropenem.     Proceed with conservative care.  Continue to address medical issues.  If there is an alternative to meropenem or cefepime (which may exacerbate delirium), that may be ideal, if not then simply continue with desired course to sufficiently addresses his infection.     Delirium protocol.  If pharmacologic agents are needed, consider low potency neuroleptics e.g. Seroquel nightly, olanzapine PRN.     Continue with outpatient neurology follow-up after discharge for continued cognitive impairment workup.  Unfortunately he is not a shunt candidate.     No further recommendations at this time.  Neurology will sign off but will be available for questions.    Addendum:  B12 low/borderline. Repletion is indicated. Orders placed.     Nathanael Farr, DO  Neurology      Cumulative time spent today was 45 minutes which included chart review, obtaining history (from patient, family, or other providers), review of images, examining the patient,

## 2025-07-22 NOTE — PROGRESS NOTES
VANCO DAILY FOLLOW UP NOTE  [facility]   Pharmacy Pharmacokinetic Monitoring Service - Vancomycin    Consulting Provider: Dr. Cuenca   Indication: CNS infection  Target Concentration: Goal AUC/JEANNE 400-600 mg*hr/L  Day of Therapy: 5  Additional Antimicrobials: meropenem    Pertinent Laboratory Values:   Wt Readings from Last 1 Encounters:   07/14/25 93 kg (205 lb)     Temp Readings from Last 1 Encounters:   07/22/25 98.4 °F (36.9 °C) (Oral)     Recent Labs     07/20/25  0652 07/20/25  1531 07/21/25  0534 07/22/25  0628   BUN  --  29* 28* 23   CREATININE  --  1.67* 1.48* 1.36*   WBC 9.2  --  8.1 7.6   PROCAL  --   --  0.20* 0.20*     Estimated Creatinine Clearance: 55 mL/min (A) (based on SCr of 1.36 mg/dL (H)).    Lab Results   Component Value Date/Time    VANCORANDOM 11.4 07/22/2025 06:28 AM     MRSA Nasal Swab: N/A. Non-respiratory infection    Assessment:  Date/Time Dose Concentration AUC   7/20 0652 1250 mg q24h 10.4 488   7/22 0628 1250 mg q24h 11.4 379   Note: Serum concentrations collected for AUC dosing may appear elevated if collected in close proximity to the dose administered, this is not necessarily an indication of toxicity    Plan:  Current dosing regimen is sub-therapeutic  Increase dose to 1500 mg every 18 hours for predicted AUC/Tr of 503/14.6  Repeat vancomycin concentrations will be ordered as clinically appropriate   Pharmacy will continue to monitor patient and adjust therapy as indicated    Thank you for the consult,  Chitra Salomon [facility]

## 2025-07-23 LAB
APPEARANCE UR: CLEAR
BACTERIA SPEC CULT: NORMAL
BACTERIA URNS QL MICRO: NEGATIVE /HPF
BILIRUB UR QL: NEGATIVE
C TRACH RRNA SPEC QL NAA+PROBE: NEGATIVE
CASTS URNS QL MICRO: 0 /LPF
COLOR UR: ABNORMAL
CRYPTOC AG SER QL LA: NEGATIVE
CRYSTALS URNS QL MICRO: 0 /LPF
EPI CELLS #/AREA URNS HPF: ABNORMAL /HPF
GLUCOSE UR STRIP.AUTO-MCNC: 100 MG/DL
GRAM STN SPEC: NORMAL
GRAM STN SPEC: NORMAL
HGB UR QL STRIP: NEGATIVE
HYALINE CASTS URNS QL MICRO: ABNORMAL /LPF
KETONES UR QL STRIP.AUTO: ABNORMAL MG/DL
LEUKOCYTE ESTERASE UR QL STRIP.AUTO: NEGATIVE
MUCOUS THREADS URNS QL MICRO: 0 /LPF
N GONORRHOEA RRNA SPEC QL NAA+PROBE: NEGATIVE
NITRITE UR QL STRIP.AUTO: NEGATIVE
PH UR STRIP: 5.5 (ref 5–9)
PROT UR STRIP-MCNC: 30 MG/DL
RBC #/AREA URNS HPF: ABNORMAL /HPF
SERVICE CMNT-IMP: NORMAL
SP GR UR REFRACTOMETRY: 1.02 (ref 1–1.02)
SPECIMEN SOURCE: NORMAL
T VAGINALIS RRNA SPEC QL NAA+PROBE: NEGATIVE
URINE CULTURE IF INDICATED: ABNORMAL
UROBILINOGEN UR QL STRIP.AUTO: 1 EU/DL (ref 0.2–1)
WBC URNS QL MICRO: ABNORMAL /HPF

## 2025-07-23 PROCEDURE — 2580000003 HC RX 258: Performed by: NURSE PRACTITIONER

## 2025-07-23 PROCEDURE — 1100000000 HC RM PRIVATE

## 2025-07-23 PROCEDURE — 99232 SBSQ HOSP IP/OBS MODERATE 35: CPT | Performed by: NURSE PRACTITIONER

## 2025-07-23 PROCEDURE — 97530 THERAPEUTIC ACTIVITIES: CPT

## 2025-07-23 PROCEDURE — 2500000003 HC RX 250 WO HCPCS

## 2025-07-23 PROCEDURE — 6360000002 HC RX W HCPCS: Performed by: INTERNAL MEDICINE

## 2025-07-23 PROCEDURE — 6360000002 HC RX W HCPCS: Performed by: NURSE PRACTITIONER

## 2025-07-23 PROCEDURE — 97112 NEUROMUSCULAR REEDUCATION: CPT

## 2025-07-23 PROCEDURE — 81001 URINALYSIS AUTO W/SCOPE: CPT

## 2025-07-23 PROCEDURE — 36415 COLL VENOUS BLD VENIPUNCTURE: CPT

## 2025-07-23 PROCEDURE — 6370000000 HC RX 637 (ALT 250 FOR IP): Performed by: NEUROLOGICAL SURGERY

## 2025-07-23 PROCEDURE — 76937 US GUIDE VASCULAR ACCESS: CPT

## 2025-07-23 PROCEDURE — 2580000003 HC RX 258: Performed by: INTERNAL MEDICINE

## 2025-07-23 PROCEDURE — 6360000002 HC RX W HCPCS: Performed by: STUDENT IN AN ORGANIZED HEALTH CARE EDUCATION/TRAINING PROGRAM

## 2025-07-23 PROCEDURE — 84425 ASSAY OF VITAMIN B-1: CPT

## 2025-07-23 PROCEDURE — 6360000002 HC RX W HCPCS

## 2025-07-23 RX ORDER — CYANOCOBALAMIN 1000 UG/ML
1000 INJECTION, SOLUTION INTRAMUSCULAR; SUBCUTANEOUS
Status: DISCONTINUED | OUTPATIENT
Start: 2025-08-27 | End: 2025-07-25 | Stop reason: HOSPADM

## 2025-07-23 RX ORDER — HALOPERIDOL 5 MG/ML
5 INJECTION INTRAMUSCULAR ONCE
Status: COMPLETED | OUTPATIENT
Start: 2025-07-23 | End: 2025-07-23

## 2025-07-23 RX ORDER — KETOROLAC TROMETHAMINE 15 MG/ML
15 INJECTION, SOLUTION INTRAMUSCULAR; INTRAVENOUS ONCE
Status: COMPLETED | OUTPATIENT
Start: 2025-07-23 | End: 2025-07-23

## 2025-07-23 RX ORDER — CYANOCOBALAMIN 1000 UG/ML
1000 INJECTION, SOLUTION INTRAMUSCULAR; SUBCUTANEOUS WEEKLY
Status: DISCONTINUED | OUTPATIENT
Start: 2025-07-30 | End: 2025-07-25 | Stop reason: HOSPADM

## 2025-07-23 RX ORDER — CYANOCOBALAMIN 1000 UG/ML
1000 INJECTION, SOLUTION INTRAMUSCULAR; SUBCUTANEOUS DAILY
Status: DISCONTINUED | OUTPATIENT
Start: 2025-07-23 | End: 2025-07-25 | Stop reason: HOSPADM

## 2025-07-23 RX ORDER — THIAMINE HYDROCHLORIDE 100 MG/ML
500 INJECTION, SOLUTION INTRAMUSCULAR; INTRAVENOUS 3 TIMES DAILY
Status: DISCONTINUED | OUTPATIENT
Start: 2025-07-23 | End: 2025-07-25 | Stop reason: HOSPADM

## 2025-07-23 RX ADMIN — THIAMINE HYDROCHLORIDE 500 MG: 100 INJECTION, SOLUTION INTRAMUSCULAR; INTRAVENOUS at 15:45

## 2025-07-23 RX ADMIN — CYANOCOBALAMIN 1000 MCG: 1000 INJECTION, SOLUTION INTRAMUSCULAR; SUBCUTANEOUS at 10:31

## 2025-07-23 RX ADMIN — HALOPERIDOL LACTATE 5 MG: 5 INJECTION, SOLUTION INTRAMUSCULAR at 09:45

## 2025-07-23 RX ADMIN — MEROPENEM 2000 MG: 1 INJECTION INTRAVENOUS at 10:26

## 2025-07-23 RX ADMIN — VANCOMYCIN HYDROCHLORIDE 1250 MG: 10 INJECTION, POWDER, LYOPHILIZED, FOR SOLUTION INTRAVENOUS at 23:21

## 2025-07-23 RX ADMIN — SODIUM CHLORIDE, PRESERVATIVE FREE 5 ML: 5 INJECTION INTRAVENOUS at 08:19

## 2025-07-23 RX ADMIN — VANCOMYCIN HYDROCHLORIDE 1250 MG: 10 INJECTION, POWDER, LYOPHILIZED, FOR SOLUTION INTRAVENOUS at 05:08

## 2025-07-23 RX ADMIN — MEROPENEM 2000 MG: 1 INJECTION INTRAVENOUS at 16:16

## 2025-07-23 RX ADMIN — THIAMINE HYDROCHLORIDE 500 MG: 100 INJECTION, SOLUTION INTRAMUSCULAR; INTRAVENOUS at 22:24

## 2025-07-23 RX ADMIN — SODIUM CHLORIDE, PRESERVATIVE FREE 10 ML: 5 INJECTION INTRAVENOUS at 22:24

## 2025-07-23 RX ADMIN — Medication 12 MG: at 22:24

## 2025-07-23 RX ADMIN — THIAMINE HYDROCHLORIDE 500 MG: 100 INJECTION, SOLUTION INTRAMUSCULAR; INTRAVENOUS at 12:24

## 2025-07-23 RX ADMIN — KETOROLAC TROMETHAMINE 15 MG: 15 INJECTION, SOLUTION INTRAMUSCULAR; INTRAVENOUS at 15:45

## 2025-07-23 RX ADMIN — MEROPENEM 2000 MG: 1 INJECTION INTRAVENOUS at 00:07

## 2025-07-23 NOTE — PROGRESS NOTES
ACUTE PHYSICAL THERAPY GOALS: Reviewed  & Updated Upon Re-Assessment 7/21/25  (Developed with and agreed upon by patient and/or caregiver.)  (1.) Azam May Jr.  will move from supine to sit and sit to supine , scoot up and down, and roll side to side with MINIMAL ASSIST within 7 treatment day(s).    (2.) Azam May Jr. will transfer from bed to chair and chair to bed with MINIMAL ASSIST using the least restrictive device within 7 treatment day(s).    (3.) Azam May Jr. will ambulate with MINIMAL ASSIST for 100 feet with the least restrictive device within 7 treatment day(s).   (4.) Azam May Jr. will perform standing static and dynamic balance activities x 15 minutes with MINIMAL ASSIST to improve safety within 7 treatment day(s).  (5.) Azam May Jr. will perform therapeutic exercises x 20 min for HEP with SUPERVISION to improve strength, endurance, and functional mobility within 7 treatment day(s).     PHYSICAL THERAPY: Daily Note AM   (Link to Caseload Tracking: PT Visit Days : 3  Time In/Out PT Charge Capture  Rehab Caseload Tracker  Orders    Azam May Jr. is a 75 y.o. male   PRIMARY DIAGNOSIS: Essential hypertension  NPH (normal pressure hydrocephalus) (HCC) [G91.2]    9 Days Post-Op  Inpatient: Payor: MEDICARE / Plan: MEDICARE PART A AND B / Product Type: *No Product type* /     ASSESSMENT:     REHAB RECOMMENDATIONS:   Recommendation to date pending progress:  Setting:  Rehab    Equipment:    To Be Determined     ASSESSMENT:    Mr. May presents resting in bed, supportive spouse at bedside. PT facilitated  neuromuscular reeducation activities including supine > sit, sitting balance activities with facilitation of midline orientation as pt with significant tendency to push/pull to R, though more responsive to correction and facilitation of midline today with multimodal cues. Able to facilitate sit <> stand transfer Max Ax2 ; pt with difficulty

## 2025-07-23 NOTE — PROGRESS NOTES
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  Meningitis PCR negative.  Blood cx x 2  NGTD  Syphilis screen non-reactive   Normal pressure hydrocephalus    Outpatient memory, gait, incontinence issues for 6-7 months.  Admitted for lumbar drain trial--placed 25 but now removed in setting above.    Encephalopathy in setting above   Mentation seems to fluctuate with patient having more agitation in the past 24 hours.    CT head  with no acute abnormality but did note mild global generalized brain volume loss with minimal chronic small vessel ischemic changes and ventricular enlargement     Neurology was consulted  but has signed off.  Recommendations for delirium protocol given.    CKD  Can impact antibiotic dosing  Fever in setting above:  Fever curve has improved over the past 48 hours.     Scrotal tenderness/pain , which the patient denies today   History of prostate cancer     Plan   Continue to monitor patient's mental status as this seems to be fluctuating.  At this point, we will continue Vancomycin pharmacy to dose and Merrem 2 grams Q 8 hours as there are no other optimal options to transition to in the setting of treating meningitis with unknown organism and for brain penetration.     Follow renal function and pharmacy should renally dose adjust abx therapy based on renal function.    Follow chlamydia/gonorrhea screen   Continue to trend fever curve, which currently appears to be improving  Patient's only complaint today is right great toe pain.  Some edema noted to the right toe today compared to the left toe.  Uncertain etiology (?trauma vs

## 2025-07-23 NOTE — PROGRESS NOTES
Resident Progress Note   Admit Date:  2025  3:53 PM   Name:  Azam May Jr.   Age:  75 y.o.  Sex:  male  :  1950   MRN:  [id number]   Room:  Formerly named [facility]/    Presenting Complaint: No chief complaint on file.    Reason(s) for Admission: NPH (normal pressure hydrocephalus) (HCC) [G91.2]     Hospital Course:   A 75-year-old male who has been following with neurology for almost 7-month presented to the hospital with memory, gait changes associated with urinary incontinence. Patient was admitted initially by neurosurgery as the MRI was concerning for new normal pressure hydrocephalus. A trial of lumbar drain was done placed on July 15, 2025 and concern of dislodgment was raised earlier this week. Yesterday the patient woke up with acute confusion associated with fever; nausea and vomiting. Patient was reporting chills and rigor blood culture of 2 sets were sent and they are negative for 18 and 16 hours so far. CSF culture and Gram stain is also pending CSF routine is high of 172 and white cell count of 1250 with neutrophils of 98% patient was placed on IV vancomycin IV cefepime IV ampicillin and IV Flagyl. ID team consulted for concern of meningitis versus encephalitis ampicillin and Flagyl were stopped and the switch to meropenem 2 g IV every 12 hours. CT head was concerning for ventriculomegaly and atherosclerosis. Chest x-ray was concerning for atelectasis versus pneumonia and showing small pleural effusion.  Patient's blood and CSF culture are negative.     Subjective & 24-hour events:  Patient seen and examined today, he is only oriented to self and place and was agitated and confused. Patient is a fall risk but nurse reports he tried to get out of bed alone multiple times and has ripped out his IV. He is also reported to have tried to kick staff. 5 mg IM Haloperidol was ordered and administered to help calm him.    CT scan done on  showed:  1. No evidence of acute intracranial abnormality  2.  - 0.2 K/uL    Differential Type AUTOMATED      Neutrophils % 77.4 43.0 - 78.0 %    Lymphocytes % 7.0 (L) 13.0 - 44.0 %    Monocytes % 12.3 (H) 4.0 - 12.0 %    Eosinophils % 1.7 0.5 - 7.8 %    Basophils % 0.8 0.0 - 2.0 %    Immature Granulocytes % 0.8 0.0 - 5.0 %    Neutrophils Absolute 5.88 1.70 - 8.20 K/UL    Lymphocytes Absolute 0.53 0.50 - 4.60 K/UL    Monocytes Absolute 0.93 0.10 - 1.30 K/UL    Eosinophils Absolute 0.13 0.00 - 0.80 K/UL    Basophils Absolute 0.06 0.00 - 0.20 K/UL    Immature Granulocytes Absolute 0.06 0.0 - 0.5 K/UL   Comprehensive Metabolic Panel    Collection Time: 07/22/25  6:28 AM   Result Value Ref Range    Sodium 138 136 - 145 mmol/L    Potassium 4.5 3.5 - 5.1 mmol/L    Chloride 103 98 - 107 mmol/L    CO2 24 20 - 29 mmol/L    Anion Gap 11 7 - 16 mmol/L    Glucose 110 (H) 70 - 99 mg/dL    BUN 23 8 - 23 MG/DL    Creatinine 1.36 (H) 0.80 - 1.30 MG/DL    Est, Glom Filt Rate 55 (L) >60 ml/min/1.73m2    Calcium 8.9 8.8 - 10.2 MG/DL    Total Bilirubin 1.2 0.0 - 1.2 MG/DL    ALT 46 8 - 55 U/L    AST 41 (H) 15 - 37 U/L    Alk Phosphatase 58 40 - 129 U/L    Total Protein 6.0 (L) 6.3 - 8.2 g/dL    Albumin 3.3 3.2 - 4.6 g/dL    Globulin 2.7 2.3 - 3.5 g/dL    Albumin/Globulin Ratio 1.2 1.0 - 1.9     C-Reactive Protein    Collection Time: 07/22/25  6:28 AM   Result Value Ref Range    CRP 2.1 (H) 0.0 - 0.4 mg/dL   Procalcitonin    Collection Time: 07/22/25  6:28 AM   Result Value Ref Range    Procalcitonin 0.20 (H) 0.00 - 0.10 ng/mL   Vancomycin Level, Random    Collection Time: 07/22/25  6:28 AM   Result Value Ref Range    Vancomycin Rm 11.4 UG/ML   Venous Blood Gas, POC    Collection Time: 07/22/25  4:35 PM   Result Value Ref Range    DEVICE ROOM AIR      FIO2 21 %    PH, VENOUS (POC) 7.44 (H) 7.32 - 7.42      PCO2, [place], POC 42.6 41 - 51 MMHG    PO2, VENOUS (POC) 35 mmHg    HCO3, Venous 28.6 22 - 29 MMOL/L    SO2, VENOUS (POC) 68.5 65 - 88 %    BASE EXCESS, VENOUS (POC) 3.8 mmol/L    Specimen type:

## 2025-07-23 NOTE — PROGRESS NOTES
VANCO DAILY FOLLOW UP NOTE  [facility]   Pharmacy Pharmacokinetic Monitoring Service - Vancomycin    Consulting Provider: Dr. Cuenca   Indication: CNS infection  Target Concentration: Goal AUC/JEANNE 400-600 mg*hr/L  Day of Therapy: 6  Additional Antimicrobials: meropenem    Pertinent Laboratory Values:   Wt Readings from Last 1 Encounters:   07/14/25 93 kg (205 lb)     Temp Readings from Last 1 Encounters:   07/23/25 98.4 °F (36.9 °C) (Oral)     Recent Labs     07/21/25  0534 07/22/25  0628 07/22/25  1647   BUN 28* 23 25*   CREATININE 1.48* 1.36* 1.36*   WBC 8.1 7.6 7.5   PROCAL 0.20* 0.20*  --      Estimated Creatinine Clearance: 55 mL/min (A) (based on SCr of 1.36 mg/dL (H)).    Lab Results   Component Value Date/Time    VANCORANDOM 11.4 07/22/2025 06:28 AM     MRSA Nasal Swab: N/A. Non-respiratory infection    Assessment:  Date/Time Dose Concentration AUC   7/20 0652 1250 mg q24h 10.4 488   7/22 0628 1250 mg q24h 11.4 379   Note: Serum concentrations collected for AUC dosing may appear elevated if collected in close proximity to the dose administered, this is not necessarily an indication of toxicity    Plan:  Current dosing regimen is therapeutic  Continue current dose 1250 mg q18h for predicted  and trough 14.6  Repeat vancomycin concentrations will be ordered as clinically appropriate   Pharmacy will continue to monitor patient and adjust therapy as indicated      Thank you for the consult,  Armani Michelle [facility]

## 2025-07-23 NOTE — PROGRESS NOTES
ACUTE OCCUPATIONAL THERAPY GOALS:   (Developed with and agreed upon by patient and/or caregiver.) GOALS UPDATED 7/21/25  Patient will complete lower body dressing with  MINIMAL ASSISTANCE. GOAL MODIFIED   Patient will complete functional transfers with  MODERATE ASSISTANCE. GOAL MODIFIED    DISCONTINUE GOAL  DISCONTINUE GOAL     New goals added 7/21:   5. Patient will complete self feeding and grooming with setup.   6. Patient will complete toileting with moderate assistance.   Timeframe: 7 visits     OCCUPATIONAL THERAPY Daily Note       OT Visit Days: 2  Acknowledge Orders  Time  OT Charge Capture  Rehab Caseload Tracker      Azam May Jr. is a 75 y.o. male   PRIMARY DIAGNOSIS: Essential hypertension  NPH (normal pressure hydrocephalus) ([facility]) [G91.2]    9 Days Post-Op  Reason for Referral: Other lack of cordination (R27.8)  Inpatient: Payor: MEDICARE / Plan: MEDICARE PART A AND B / Product Type: *No Product type* /     ASSESSMENT:     REHAB RECOMMENDATIONS:   Recommendation to date pending progress:  Setting:  REHAB    Equipment:    To Be Determined     ASSESSMENT:  Mr. May is a 74 y/o M admitted for lumbar drain trial with Dr. Brown. Patient has 6 month history of shuffling gait, memory issues, & urinary incontinence. Lives with spouse & adult son who assist with medications & finances, but otherwise patient is fairly independent with ADLs & IADLs. Reports one recent fall, but admits to \"furniture walking.\" He has a cane & a walker but does not use them. He admits to being fairly sedentary spending most of his day watching TV. He still drives, but rarely. He is R hand dominant and admits to difficulty completing tasks such as throwing a ball.     7/23: Patient tolerated sitting up in chair for ~5.5 hours; however, very fatigued/ drowsy & with strong R sided lean. Required maximal to total assist x3 for stand pivot transfer due to strong posterior lean, decreased command following, slow    : no tremors noted      Tone []       Edema []  C/o BLE edema 1+  : 1+ pitting edema to R foot   : R foot red, swollen (MD aware)    Activity Tolerance []    : worse today; reports feeling \"blah\", c/o fatigue   : c/o fatigue with activity   : fatigued with minimal activity   : somnolent through entire session    Hand Dominance R [x] L []      COGNITION/  PERCEPTION: INTACT IMPAIRED   (See Comments)   Orientation []  7/15: Ox person (name, ); Dx place, situation, time   : Dx place, time   : oriented to name, birthday. Disoriented to place, situation.   : oriented to hospital given multiple choices of location    Vision []     Hearing []  Hard of hearing    Cognition  [] Overall Cognitive Status: Exceptions  Arousal/Alertness: Delayed responses to stimuli  Following Commands: Inconsistently follows commands  Attention Span: Unable to maintain attention, Difficulty dividing attention, Difficulty attending to directions  Memory: Decreased short term memory, Decreased recall of recent events  Safety Judgement: Decreased awareness of need for assistance, Decreased awareness of need for safety  Problem Solving: Impaired, Assistance required to generate solutions, Assistance required to identify errors made, Assistance required to correct errors made  Insights: Decreased awareness of deficits  Initiation: Requires cues for all  : very drowsy so difficult to fully assess; decreased command following; decreased initiation     : initiation worse; perseverates/ fidgets with items which is new; disoriented to place despite reorientation; able to recognize and name his wife but no recollection of therapists and what we did every day last week; easily agitated; decreased insight into deficits; slow processing speed; impaired attention; difficulty with task sequencing and problem solving during functional activity (donning shoes)     : Additional processing time required;

## 2025-07-23 NOTE — CARE COORDINATION
RN CM in room, patient appears to be resting comfortably. Wife not in room at this time, VSE in room. Therapy recommending rehab when medically ready. Will discuss with wife and see if she has preference in choices and options will be left with wife.

## 2025-07-23 NOTE — PLAN OF CARE
Problem: Pain  Goal: Verbalizes/displays adequate comfort level or baseline comfort level  7/23/2025 0942 by Tatyana Guerrero RN  Outcome: Progressing  Flowsheets (Taken 7/23/2025 0745)  Verbalizes/displays adequate comfort level or baseline comfort level: Encourage patient to monitor pain and request assistance  7/23/2025 0345 by Bernie Bartlett RN  Outcome: Progressing  7/22/2025 2104 by Bernie Bartlett RN  Outcome: Progressing     Problem: Discharge Planning  Goal: Discharge to home or other facility with appropriate resources  7/23/2025 0942 by Tatyana Guerrero RN  Outcome: Progressing  7/23/2025 0345 by Bernie Bartlett RN  Outcome: Progressing  7/22/2025 2104 by Bernie Bartlett RN  Outcome: Progressing     Problem: Safety - Adult  Goal: Free from fall injury  7/23/2025 0942 by Tatyana Guerrero RN  Outcome: Progressing  7/23/2025 0345 by Bernie Bartlett RN  Outcome: Progressing  7/22/2025 2104 by Bernie Bartlett RN  Outcome: Progressing     Problem: ABCDS Injury Assessment  Goal: Absence of physical injury  7/23/2025 0942 by Tatyana Guerrero RN  Outcome: Progressing  7/23/2025 0345 by Bernie Bartlett RN  Outcome: Progressing  7/22/2025 2104 by Bernie Bartlett RN  Outcome: Progressing     Problem: Skin/Tissue Integrity  Goal: Skin integrity remains intact  Description: 1.  Monitor for areas of redness and/or skin breakdown  2.  Assess vascular access sites hourly  3.  Every 4-6 hours minimum:  Change oxygen saturation probe site  4.  Every 4-6 hours:  If on nasal continuous positive airway pressure, respiratory therapy assess nares and determine need for appliance change or resting period  7/23/2025 0942 by Tatyana Guerrero RN  Outcome: Progressing  7/23/2025 0345 by Bernie Bartlett RN  Outcome: Progressing  7/22/2025 2104 by Bernie Bartlett RN  Outcome: Progressing     Problem: Neurosensory - Adult  Goal:

## 2025-07-23 NOTE — CONSULTS
Ultrasound was used to find the vein which was compressible and without any ultrasound features of an artery or nerve bundle. Skin was cleaned and disinfected prior to IV puncture.  Under real-time ultrasound guidance peripheral access was obtained in the left forearm using 22 G 1.75\" Peripheral IV catheter after 1 attempt(s). No immediate complications noted. Patient tolerated the procedure well.  Refer to IV flowsheet for further documentation.

## 2025-07-23 NOTE — PROGRESS NOTES
[facility]    Assessment: S/P lumbar drain trial with post operative infection    POD#9    Plan:  - Hospitalist team has agreed to take over as primary for this patient    - Patient developed some form of postprocedural infection likely due to the dismantling of his drain  - Culture still with no growth  -Patient is cleared to discharge home from a neurosurgical perspective whenever infectious diseases and medicine feels patient is ready  - Will continue to let ID and medicine manage antibiotics and infection.  - Patient removed his IV again without consent stating that he felt like it was time for it to come out.  Nurses made aware and discussed that we will likely need to place a new IV for the patient in order to continue getting antibiotics.    Subjective:    Objective:  Alert and oriented to place, self and month but when asked to explain why he was in the hospital patient made up the story saying that he passed out about a week ago and that was why he was in the hospital.  DONALD  Low grade fever  VSS  Eyes open spontaneously  Full motor strength    Patient Vitals for the past 12 hrs:   Temp Pulse Resp BP SpO2   07/23/25 0823 98.4 °F (36.9 °C) 95 18 (!) 149/73 97 %   07/23/25 0247 98.4 °F (36.9 °C) 70 18 131/83 99 %        Recent Results (from the past 24 hours)   Venous Blood Gas, POC    Collection Time: 07/22/25  4:35 PM   Result Value Ref Range    DEVICE ROOM AIR      FIO2 21 %    PH, VENOUS (POC) 7.44 (H) 7.32 - 7.42      PCO2, [place], POC 42.6 41 - 51 MMHG    PO2, VENOUS (POC) 35 mmHg    HCO3, Venous 28.6 22 - 29 MMOL/L    SO2, VENOUS (POC) 68.5 65 - 88 %    BASE EXCESS, VENOUS (POC) 3.8 mmol/L    Specimen type: VENOUS BLOOD      Performed by: Babs    Ammonia    Collection Time: 07/22/25  4:47 PM   Result Value Ref Range    Ammonia 44 16 - 60 umol/L   CBC with Auto Differential    Collection Time: 07/22/25  4:47 PM   Result Value Ref Range    WBC 7.5 4.3 -

## 2025-07-24 ENCOUNTER — APPOINTMENT (OUTPATIENT)
Dept: ULTRASOUND IMAGING | Age: 75
End: 2025-07-24
Attending: NEUROLOGICAL SURGERY
Payer: MEDICARE

## 2025-07-24 PROBLEM — Z78.9 DECREASED ACTIVITIES OF DAILY LIVING (ADL): Status: ACTIVE | Noted: 2025-07-24

## 2025-07-24 PROBLEM — Z51.89 ENCOUNTER FOR OTHER SPECIFIED AFTERCARE: Status: ACTIVE | Noted: 2025-07-24

## 2025-07-24 PROBLEM — R26.9 GAIT ABNORMALITY: Status: ACTIVE | Noted: 2025-07-24

## 2025-07-24 LAB — METHYLMALONATE SERPL-SCNC: 784 NMOL/L (ref 0–378)

## 2025-07-24 PROCEDURE — 6360000002 HC RX W HCPCS: Performed by: NURSE PRACTITIONER

## 2025-07-24 PROCEDURE — 1100000000 HC RM PRIVATE

## 2025-07-24 PROCEDURE — 2500000003 HC RX 250 WO HCPCS

## 2025-07-24 PROCEDURE — 6360000002 HC RX W HCPCS: Performed by: STUDENT IN AN ORGANIZED HEALTH CARE EDUCATION/TRAINING PROGRAM

## 2025-07-24 PROCEDURE — 2580000003 HC RX 258: Performed by: NURSE PRACTITIONER

## 2025-07-24 PROCEDURE — 6360000002 HC RX W HCPCS

## 2025-07-24 PROCEDURE — 2580000003 HC RX 258: Performed by: INTERNAL MEDICINE

## 2025-07-24 PROCEDURE — 97129 THER IVNTJ 1ST 15 MIN: CPT

## 2025-07-24 PROCEDURE — 97130 THER IVNTJ EA ADDL 15 MIN: CPT

## 2025-07-24 PROCEDURE — 97112 NEUROMUSCULAR REEDUCATION: CPT

## 2025-07-24 PROCEDURE — 6360000002 HC RX W HCPCS: Performed by: INTERNAL MEDICINE

## 2025-07-24 PROCEDURE — 99222 1ST HOSP IP/OBS MODERATE 55: CPT | Performed by: STUDENT IN AN ORGANIZED HEALTH CARE EDUCATION/TRAINING PROGRAM

## 2025-07-24 PROCEDURE — 97535 SELF CARE MNGMENT TRAINING: CPT

## 2025-07-24 PROCEDURE — 93971 EXTREMITY STUDY: CPT

## 2025-07-24 PROCEDURE — 97116 GAIT TRAINING THERAPY: CPT

## 2025-07-24 RX ORDER — MEROPENEM 1 G/1
2000 INJECTION, POWDER, FOR SOLUTION INTRAVENOUS EVERY 8 HOURS
Qty: 24 EACH | Refills: 0 | Status: ON HOLD
Start: 2025-07-24 | End: 2025-07-28

## 2025-07-24 RX ADMIN — CYANOCOBALAMIN 1000 MCG: 1000 INJECTION, SOLUTION INTRAMUSCULAR; SUBCUTANEOUS at 08:17

## 2025-07-24 RX ADMIN — THIAMINE HYDROCHLORIDE 500 MG: 100 INJECTION, SOLUTION INTRAMUSCULAR; INTRAVENOUS at 10:30

## 2025-07-24 RX ADMIN — SODIUM CHLORIDE, PRESERVATIVE FREE 5 ML: 5 INJECTION INTRAVENOUS at 08:17

## 2025-07-24 RX ADMIN — MEROPENEM 2000 MG: 1 INJECTION INTRAVENOUS at 08:47

## 2025-07-24 RX ADMIN — VANCOMYCIN HYDROCHLORIDE 1250 MG: 10 INJECTION, POWDER, LYOPHILIZED, FOR SOLUTION INTRAVENOUS at 18:07

## 2025-07-24 RX ADMIN — MEROPENEM 2000 MG: 1 INJECTION INTRAVENOUS at 01:06

## 2025-07-24 RX ADMIN — THIAMINE HYDROCHLORIDE 500 MG: 100 INJECTION, SOLUTION INTRAMUSCULAR; INTRAVENOUS at 19:59

## 2025-07-24 RX ADMIN — SODIUM CHLORIDE, PRESERVATIVE FREE 10 ML: 5 INJECTION INTRAVENOUS at 19:59

## 2025-07-24 RX ADMIN — THIAMINE HYDROCHLORIDE 500 MG: 100 INJECTION, SOLUTION INTRAMUSCULAR; INTRAVENOUS at 16:17

## 2025-07-24 RX ADMIN — MEROPENEM 2000 MG: 1 INJECTION INTRAVENOUS at 16:16

## 2025-07-24 ASSESSMENT — PAIN SCALES - GENERAL: PAINLEVEL_OUTOF10: 0

## 2025-07-24 NOTE — DISCHARGE INSTRUCTIONS
Patient is going to IRC on 9th floor. Per ID recommendation, IV antibiotics will be continued for 2 weeks.

## 2025-07-24 NOTE — DISCHARGE SUMMARY
Internal Medicine Resident Discharge Summary   Admit Date:  2025  3:53 PM   DC Note date: 2025  Name:  Azam May Jr.   Age:  75 y.o.  Sex:  male  :  1950   MRN:  [id number]   Room:  [facility]/  PCP:  Nathanael Andrade Jr., MD    Presenting Complaint: No chief complaint on file.     Initial Admission Diagnosis: NPH (normal pressure hydrocephalus) ([facility]) [G91.2]     Problem List for this Hospitalization (present on admission):    Principal Problem:    Essential hypertension  Active Problems:    GERD (gastroesophageal reflux disease)    Pure hypercholesterolemia    Prostate cancer (HCC)    NPH (normal pressure hydrocephalus) (HCC)    Meningitis    Septic encephalopathy    Severe sepsis (HCC)    Stage 3a chronic kidney disease (CKD) (HCC)    Gait abnormality    Decreased activities of daily living (ADL)  Resolved Problems:    Encounter for other specified aftercare    H and P  Azam May Jr.is a 75 y.o. male with a PMH of prostate cancer who presents for lumbar drain trial.  Patient has had 6 or 7 months of worsening memory problems, abnormal gait and urinary incontinence.  He has been noted to have a shuffling or awkward gait.  Memory problems have been more significant per the spouse rather than reported by patient.  He had an MRI of his brain which showed enlarged ventricles, he was sent to neurosurgery for workup of normal pressure hydrocephalus.     Hospital Course:  A 75-year-old male who has been following with neurology for almost 7-month presented to the hospital with memory, gait changes associated with urinary incontinence. Patient was admitted initially by neurosurgery as the MRI was concerning for new normal pressure hydrocephalus. A trial of lumbar drain was done placed on July 15, 2025 and concern of dislodgment was raised earlier this week. Yesterday the patient woke up with acute confusion associated with fever; nausea and vomiting. Patient was reporting chills and rigor

## 2025-07-24 NOTE — CARE COORDINATION
RN CM in room, patient sitting up in chair, alert, discussed therapy recommendations of IRC. Patient in agreement with referral to 9th floor, per SLP wife Johny also in agreement per their discussion. Referral sent and RN CM will continue to monitor for DC plans.

## 2025-07-24 NOTE — PROGRESS NOTES
ACUTE PHYSICAL THERAPY GOALS: Reviewed  & Updated Upon Re-Assessment 7/21/25  (Developed with and agreed upon by patient and/or caregiver.)  (1.) Azam May Jr.  will move from supine to sit and sit to supine , scoot up and down, and roll side to side with MINIMAL ASSIST within 7 treatment day(s).    (2.) Azam May Jr. will transfer from bed to chair and chair to bed with MINIMAL ASSIST using the least restrictive device within 7 treatment day(s).    (3.) Azam May Jr. will ambulate with MINIMAL ASSIST for 100 feet with the least restrictive device within 7 treatment day(s).   (4.) Azam May Jr. will perform standing static and dynamic balance activities x 15 minutes with MINIMAL ASSIST to improve safety within 7 treatment day(s).  (5.) Azam May Jr. will perform therapeutic exercises x 20 min for HEP with SUPERVISION to improve strength, endurance, and functional mobility within 7 treatment day(s).     PHYSICAL THERAPY: Daily Note AM   (Link to Caseload Tracking: PT Visit Days : 4  Time In/Out PT Charge Capture  Rehab Caseload Tracker  Orders    Azam May Jr. is a 75 y.o. male   PRIMARY DIAGNOSIS: Essential hypertension  NPH (normal pressure hydrocephalus) (HCC) [G91.2]    10 Days Post-Op  Inpatient: Payor: MEDICARE / Plan: MEDICARE PART A AND B / Product Type: *No Product type* /     ASSESSMENT:     REHAB RECOMMENDATIONS:   Recommendation to date pending progress:  Setting:  Inpatient Rehab Facility    Equipment:    To Be Determined     ASSESSMENT:  Mr. May is a 75 year old male admitted for lumbar drain trail for NPH workup. Patient now with drain removed secondary to concern for infection. At baseline, Mr. May is ambulatory without utilizing an assistive device and endorsed balance and cognitive deficits PTA. Today, patient demonstrated a significant improvement in functional mobility, command following, and activity tolerance compared to

## 2025-07-24 NOTE — PROGRESS NOTES
VANCO DAILY FOLLOW UP NOTE  [facility]   Pharmacy Pharmacokinetic Monitoring Service - Vancomycin    Consulting Provider: Dr. Cuenca   Indication: CNS infection  Target Concentration: Goal AUC/JEANNE 400-600 mg*hr/L  Day of Therapy: 7  Additional Antimicrobials: meropenem    Pertinent Laboratory Values:   Wt Readings from Last 1 Encounters:   07/14/25 93 kg (205 lb)     Temp Readings from Last 1 Encounters:   07/24/25 97.3 °F (36.3 °C) (Oral)     Recent Labs     07/22/25  0628 07/22/25  1647   BUN 23 25*   CREATININE 1.36* 1.36*   WBC 7.6 7.5   PROCAL 0.20*  --      Estimated Creatinine Clearance: 55 mL/min (A) (based on SCr of 1.36 mg/dL (H)).    Lab Results   Component Value Date/Time    VANCORANDOM 11.4 07/22/2025 06:28 AM     MRSA Nasal Swab: N/A. Non-respiratory infection    Assessment:  Date/Time Dose Concentration AUC   7/20 0652 1250 mg q24h 10.4 488   7/22 0628 1250 mg q24h 11.4 379   Note: Serum concentrations collected for AUC dosing may appear elevated if collected in close proximity to the dose administered, this is not necessarily an indication of toxicity    Plan:  Current dosing regimen is therapeutic  Continue current dose 1250 mg q18h for predicted  and trough 14.6  Repeat vancomycin concentrations will be ordered as clinically appropriate   Pharmacy will continue to monitor patient and adjust therapy as indicated      Thank you for the consult,  Julio Zavaleta RPH

## 2025-07-24 NOTE — PROGRESS NOTES
approximately 1 year ago  Lives with wife and adult son  in one story home with 3 steps to enter. Reports independence with ADLs. He is able to do his own laundry and fix simple meals. Wife assists with medications and finances. He does drive but rarely. He has a cane and a walker but was not using them. Admits to \"furniture walking\" throughout the house. Has had one fall in shower. Has a walk in shower with a seat. 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 He is R hand dominant.     OBJECTIVE:     LINES / DRAINS / AIRWAY: IV    RESTRICTIONS/PRECAUTIONS:  Restrictions/Precautions: Fall Risk, Bed Alarm    PAIN: VITALS / O2:   Pre Treatment: none          Post Treatment: none       Vitals          Oxygen            GROSS EVALUATION: INTACT IMPAIRED   (See Comments)   UE AROM [] [x]   UE PROM [] [x]   Strength []       Posture / Balance [] 7/14: sitting static good  Sitting dynamic fair+   Standing static fair  Standing dynamic fair-    7/15: one loss of balance to right side when turning   Static sitting: good  Dynamic sitting: good   Static standing: fair   Dynamic standing: fair -    7/16: one small loss of balance  Static sitting: good   Dynamic sitting: good  Static standing: fair  Dynamic standing fair-     7/17:   Static sitting: good  Dynamic sitting: good  Static standing: fair-  Dynamic standing: fair- to poor     7/21: static sitting: poor  Dynamic sitting: poor  Static standing: poor   Dynamic standing: poor     7/23: static sitting: poor, R sided lean  Dynamic sitting: poor  Static standing: poor, posterior lean   Dynamic standing: poor     7/24: static sitting: fair+  Dynamic sitting: fair (worse with fatigue); R sided lean   Static standing: fair  Dynamic standing: poor      Sensation []     Coordination []  7/24: appears intact   7/14:  BUE tremulous (pt reports due to being cold)  7/15: improved   7/16: BUE resting tremors returned  7/17: no tremors noted   7/21: no tremors noted     midline in standing (still wtih lean to R; wose with fatigue)  Initiation: Cues to initiate tasks  Perseveration: Perseverates during ADLs    7/24: still with R sided lean that worsens with fatigue; posterior lean is better    7/23: R sided lean and head tilt in sitting; strong posterior lean in standing      MOBILITY: I Mod I S SBA CGA Min Mod Max Total  NT x2 Comments:   Bed Mobility    Rolling [] [] [] [] [] [] [] [] [] [] []    Supine to Sit [] [] [] [] [] [] [] [] [] [] []    Scooting [] [] [x] [] [] [] [] [] [] [] [] In chair    Sit to Supine [] [] [] [] [] [] [] [] [] [] []    Transfers    Sit to Stand [] [] [] [] [] [x] [] [] [] [] [x] Additional cues and time requiring for processing/ initiation/ safety    Bed to Chair [] [] [] [] [] [] [] [] [] [] []    Stand to Sit [] [] [] [] [] [x] [] [] [] [] [x]    Tub/Shower [] [] [] [] [] [] [] [] [] [] []     Toilet [] [] [] [] [] [] [] [] [] [] []      [] [] [] [] [] [] [] [] [] [] []    I=Independent, Mod I=Modified Independent, S=Supervision/Setup, SBA=Standby Assistance, CGA=Contact Guard Assistance, Min=Minimal Assistance, Mod=Moderate Assistance, Max=Maximal Assistance, Total=Total Assistance, NT=Not Tested    ACTIVITIES OF DAILY LIVING: I Mod I S SBA CGA Min Mod Max Total NT Comments   BASIC ADLs:              Upper Body Bathing  [] [] [] [] [] [] [] [] [] []     Lower Body Bathing [] [] [] [] [] [] [] [] [] []     Toileting [] [] [] [] [] [] [] [] [] []    Upper Body Dressing [] [] [] [] [] [] [] [] [] []    Lower Body Dressing [] [] [] [] [] [x] [] [] [] [] Donning/ doffing socks and slip on shoes with additional time and cues to stay on task    Feeding [] [] [] [] [] [] [] [] [] []    Grooming [] [] [] [] [] [x] [] [] [] [] In unsupported sitting @ sink- shaving face, washing hair, combing hair, washing face. Cues to stay on task and maintain sitting balance    Personal Device Care [] [] [] [] [] [] [] [] [] []    Functional Mobility [] [] [] [] [] [x] []

## 2025-07-24 NOTE — PRE-CERTIFICATION NOTE
[facility]  Pre-admission Assessment    Facility Information: [facility]  Patient Name: Denny Maddox Jr.        MRN: [id number]    : 1950 (75 y.o.)  Gender: male   Ethnicity:Not of , /a, or [place] origin  Race:White    COVERAGE INFORMATION  Active Insurance as of 2025       Primary Coverage       Payor Plan Insurance Group Employer/Plan Group    MEDICARE MEDICARE PART A AND B        Payor Address Payor Phone Number Payor Fax Number Effective Dates    [address] [address] [phone]  2017 - None Entered    [facility] [id number]         Subscriber Name Subscriber Birth Date Member ID       DENNY MADDOX JR. 1950 [id number]               Secondary Coverage       Payor Plan Insurance Group Employer/Plan Group    [employer] [place] [place] SUPP PLAN G       Payor Plan Address Payor Plan Phone Number Payor Plan Fax Number Effective Dates    [address]  [phone]  2017 - None Entered    [facility] [id number]         Subscriber Name Subscriber Birth Date Member ID       DENNY MADDOX JR. 1950 [id number]                   Update Due:     PHYSICIAN/REFERRAL INFORMATION  Attending Physician: Shirin Lopez, *   Admitted From: [facility]  Date of Admission to the Hospital: 2025  3:53 PM  Date Patient Eligible for Admission: 2025    PRIOR LIVING SITUATION/LEVEL OF FUNCTION:  Social/Functional History  Lives With: Spouse, Son  Type of Home: House  Home Layout: One level  Home Access: Stairs to enter with rails  Entrance Stairs - Number of Steps: 3 in garage 8 at the front  Bathroom Shower/Tub: Walk-in shower  Bathroom Toilet: Standard  Bathroom Equipment: Grab bars in shower, Built-in shower seat, Commode  Bathroom Accessibility: Accessible  Home Equipment: Walker - Rolling  Receives Help From: Family  Prior Level of Assist for ADLs: Independent  Prior Level of Assist for Homemaking:

## 2025-07-24 NOTE — PROGRESS NOTES
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  Meningitis PCR negative.  Blood cx x 2  negative   Syphilis screen non-reactive   Normal pressure hydrocephalus    Outpatient memory, gait, incontinence issues for 6-7 months.  Admitted for lumbar drain trial--placed 25 but now removed in setting above.    Encephalopathy in setting above   Mentation seems to fluctuate.  Could be multifactorial but seems much improved today.     CT head  with no acute abnormality but did note mild global generalized brain volume loss with minimal chronic small vessel ischemic changes and ventricular enlargement     Neurology was consulted  but has signed off.  Recommendations for delirium protocol given.    CKD  Can impact antibiotic dosing  Fever in setting above:  improving   History of prostate cancer     Plan   Continue to monitor patient's mental status as this seems to be fluctuating.  Mental status appears much improved this morning compared to yesterday as patient is working with PT/OT.   At this point, we will continue Vancomycin pharmacy to dose and Merrem 2 grams Q 8 hours as there are no other optimal options to transition to in the setting of treating meningitis with unknown organism and for brain penetration.     In the event patient has neurological decline, ongoing worsening confusion, etc could consider repeat CSF studies as previously discussed between Dr. Huffman and the Hospital Medicine team.    Follow renal function and pharmacy should renally dose adjust abx therapy based on renal function.     Continue to trend fever curve, which currently appears to be

## 2025-07-24 NOTE — PROGRESS NOTES
GOALS:  LTG: Patient will improve neuro-linguistic abilities to increase safety and awareness in functional living environment.   STG: Updated 2025  Patient will display orientation to aspects of place and time with 80% accuracy provided with min A.   Patient will answer problem solving questions related to daily activities with 80% accuracy provided with min A.   Patient will recall safety precautions with 60% accuracy provided with min A.   Patient will participate in ongoing cognitive-linguistic assessment as per NPH protocol. - Protocol halted due to change in medical status.     SPEECH LANGUAGE PATHOLOGY: COGNITIVE COMMUNICATION Daily Note #5    Acknowledge Order  I  Therapy Time  I   Charges     I  Rehab Caseload Tracker    NAME: Azam May Jr.  : 1950  MRN: [id number]    ADMISSION DATE: 2025  PRIMARY DIAGNOSIS: Essential hypertension    ICD-10: Treatment Diagnosis:  R41.841 Cognitive-Communication Deficit    RECOMMENDATIONS:   Cognitive- Communicative: Patient does exhibit deficits with attention, orientation, memory, and insight though overall level of function improved on this date compared previous treatment date on 2025. As patient has exhibited improvements in overall level of function, Julien Cognitive Assessment (MOCA) administered with patient scoring 11/30 (patient with score of 14-17 during previous week).     Speech therapy will treat for cognitive- communicative function during acute phase of care and recommend speech therapy services post acute care.    Therapeutic Interventions: Patient/family education  Cognitive-linguistic treatment   Patient continues to require skilled intervention: Yes. Recommend ongoing speech therapy services during this hospitalization.   Anticipated Discharge Needs: Ongoing speech therapy is recommended at next level of care.      ASSESSMENT   Cognitive- Communicative: Patient exhibits improved alertness and ability to participate in  modified to reflect patient's current level of function.     Rehabilitation Potential For Stated Goals: Good    Interdisciplinary Collaboration: MD/ PA/ NP  and RN/ PCT    Medical Necessity    Skilled intervention continues to be required due to decreased cognitive skills.    Education:   Patient and/or family/caregiver educated on Results of evaluation, Role of speech therapy, Diet recommendations, SLP recommendations, and SLP plan  Education response: Verbalizes understanding    Safety:   Call light within reach  In Bed  RN notified   Family/visitors at bedside    Therapy Time:  Time In: 1105  Time Out: 1130  Minutes: 25    CARLA ALONSO  7/24/2025 11:37 AM

## 2025-07-24 NOTE — CONSULTS
[facility]  Inpatient Rehab Consult        Admission Date: 7/14/2025  Primary Care Provider: Nathanael Andrade Jr., MD  Specialty Group / Referring Service: Medicine      Chief Complaint : Encephalopathy  Admitting Diagnosis:   NPH (normal pressure hydrocephalus) (HCC) [G91.2]    Principal Problem:    Essential hypertension  Active Problems:    GERD (gastroesophageal reflux disease)    Pure hypercholesterolemia    Prostate cancer (HCC)    NPH (normal pressure hydrocephalus) (HCC)    Meningitis    Septic encephalopathy    Severe sepsis (HCC)    Stage 3a chronic kidney disease (CKD) ([facility])  Resolved Problems:    * No resolved hospital problems. *      Acute Rehab Diagnoses:  Encounter for rehabilitation [Z51.89]   Abnormality of gait and mobility [R26.9]  Decreased independence for activities of daily living (ADL) [Z78.9]  Physical debility / deconditioning [R53.81]      Medical Dx:  Past Medical History:   Diagnosis Date    Allergic rhinitis     Bilateral impacted cerumen     BMI 30.0-30.9,adult     BMI 30.3    Cancer (HCC)     prostate cancer- surgery    Claustrophobia     mild    Family history of colon cancer     mother and sister    GERD (gastroesophageal reflux disease)     hx of nissen fundoplication and managed with diet    History of Gatica's esophagus     History of stomach ulcers     Hx of colonic polyps 2016    adenomas    Hypertension     no meds    IBS (irritable bowel syndrome)     no current medications    Insomnia     Insomnia     Laryngopharyngeal reflux     Neck mass     Post-nasal drainage     Pure hypercholesterolemia 2/16/2018    Urinary incontinence        Date of Evaluation: July 24, 2025    Subjective       HPI: Azam Perezheidy Glez is a 75 y.o. male patient who presented to [facility] on 7/14/2025 secondary to elective lumbar drain placement for NPH.  Per H&P \"A 75-year-old male who has been following with neurology for almost 7-month presented to the  white matter  3. Ventricular enlargement not commensurate with the enlargement of the  peripheral cortical sulci suspicious for normal pressure hydrocephalus.    Electronically signed by Joel Richardson     Xray Result (most recent):  XR ABDOMEN (2 VIEWS) 07/19/2025    Narrative  Two view abdomen      INDICATION:  Distended    COMPARISON:  None      TECHNIQUE: Flat and upright views of the abdomen were obtained.    FINDINGS: The bowel gas pattern is normal. There is no evidence of obstruction.  There is no free air. There are no renal calculi. The lung bases are clear.    Impression  No acute findings in the abdomen.      Electronically signed by Raghu Rodriguez MD     MRI Result (most recent):  MRI BRAIN W WO CONTRAST 07/19/2025    Narrative  MRI of the brain    INDICATION: MRI to evaluate patient's altered mental status with the concern of  meningitis versus encephalitis    COMPARISON:  None    Standard MRI sequences were obtained through the brain in multiple planes.  Images were obtained before and after intravenous infusion of 18 mL of ProHance.    FINDINGS: Ventricles and sulci are enlarged consistent with volume loss. No  mass, mass effect, or midline shift identified.    Periventricular and subcortical white matter abnormalities are noted bilaterally  which are nonspecific and are often attributed to the sequela of  microangiopathy. Bones, soft tissues, and orbits are unremarkable.    Impression  Ventricles and sulci are enlarged consistent with volume loss.  Periventricular and subcortical white matter abnormalities are noted bilaterally  which are nonspecific and are often attributed to the sequela of  microangiopathy.    No abnormal enhancement.      Electronically signed by Martin Sharif MD         Condition on Admission: Good/Fair        Assessment and Plan:         //Gait and Self-care deficits secondary to meningitis versus encephalitis  - Waxing and waning mentation in the past and days since

## 2025-07-24 NOTE — PLAN OF CARE
Problem: Pain  Goal: Verbalizes/displays adequate comfort level or baseline comfort level  7/24/2025 1222 by Tatyana Guerrero RN  Outcome: Progressing  Flowsheets (Taken 7/24/2025 0815)  Verbalizes/displays adequate comfort level or baseline comfort level: Encourage patient to monitor pain and request assistance  7/23/2025 2259 by Bernie Bartlett RN  Outcome: Progressing     Problem: Discharge Planning  Goal: Discharge to home or other facility with appropriate resources  7/24/2025 1222 by Tatyana Guerrreo RN  Outcome: Progressing  Flowsheets (Taken 7/24/2025 0815)  Discharge to home or other facility with appropriate resources: Identify barriers to discharge with patient and caregiver  7/23/2025 2259 by Bernie Bartlett RN  Outcome: Progressing     Problem: Safety - Adult  Goal: Free from fall injury  7/24/2025 1222 by Tatyana Guerrero RN  Outcome: Progressing  7/23/2025 2259 by Bernie Bartlett RN  Outcome: Progressing     Problem: ABCDS Injury Assessment  Goal: Absence of physical injury  7/24/2025 1222 by Tatyana Guerrero RN  Outcome: Progressing  7/23/2025 2259 by Bernie Bartlett RN  Outcome: Progressing     Problem: Skin/Tissue Integrity  Goal: Skin integrity remains intact  Description: 1.  Monitor for areas of redness and/or skin breakdown  2.  Assess vascular access sites hourly  3.  Every 4-6 hours minimum:  Change oxygen saturation probe site  4.  Every 4-6 hours:  If on nasal continuous positive airway pressure, respiratory therapy assess nares and determine need for appliance change or resting period  7/24/2025 1222 by Tatyana Guerrero RN  Outcome: Progressing  Flowsheets (Taken 7/24/2025 0815)  Skin Integrity Remains Intact: Monitor for areas of redness and/or skin breakdown  7/23/2025 2259 by Bernie Bartlett RN  Outcome: Progressing     Problem: Neurosensory - Adult  Goal: Achieves maximal functionality and self care  7/24/2025 1222 by Cesar  RN  Outcome: Progressing     Problem: Gastrointestinal - Adult  Goal: Minimal or absence of nausea and vomiting  7/24/2025 1222 by Tatyana Guerrero RN  Outcome: Progressing  Flowsheets (Taken 7/24/2025 0815)  Minimal or absence of nausea and vomiting: Administer IV fluids as ordered to ensure adequate hydration  7/23/2025 2259 by Bernie Bartlett RN  Outcome: Progressing  Goal: Maintains or returns to baseline bowel function  7/24/2025 1222 by Tatyana Guerrero RN  Outcome: Progressing  Flowsheets (Taken 7/24/2025 0815)  Maintains or returns to baseline bowel function: Assess bowel function  7/23/2025 2259 by Bernie Bartlett RN  Outcome: Progressing

## 2025-07-25 ENCOUNTER — HOSPITAL ENCOUNTER (INPATIENT)
Age: 75
DRG: 091 | End: 2025-07-25
Attending: STUDENT IN AN ORGANIZED HEALTH CARE EDUCATION/TRAINING PROGRAM | Admitting: STUDENT IN AN ORGANIZED HEALTH CARE EDUCATION/TRAINING PROGRAM
Payer: MEDICARE

## 2025-07-25 VITALS
OXYGEN SATURATION: 98 % | TEMPERATURE: 98.1 F | DIASTOLIC BLOOD PRESSURE: 98 MMHG | SYSTOLIC BLOOD PRESSURE: 143 MMHG | HEIGHT: 70 IN | HEART RATE: 78 BPM | BODY MASS INDEX: 29.35 KG/M2 | RESPIRATION RATE: 18 BRPM | WEIGHT: 205 LBS

## 2025-07-25 PROBLEM — G03.8 MENINGITIS AFTER PROCEDURE: Status: ACTIVE | Noted: 2025-07-25

## 2025-07-25 PROBLEM — R45.87 IMPULSIVE: Status: ACTIVE | Noted: 2025-07-25

## 2025-07-25 PROBLEM — G97.82 MENINGITIS AFTER PROCEDURE: Status: ACTIVE | Noted: 2025-07-25

## 2025-07-25 PROBLEM — Z51.89 ENCOUNTER FOR OTHER SPECIFIED AFTERCARE: Status: RESOLVED | Noted: 2025-07-24 | Resolved: 2025-07-25

## 2025-07-25 PROBLEM — R41.89 COGNITIVE DEFICITS: Status: ACTIVE | Noted: 2025-07-25

## 2025-07-25 PROBLEM — R20.8 DYSESTHESIA AFFECTING BOTH SIDES OF BODY: Status: ACTIVE | Noted: 2025-07-25

## 2025-07-25 LAB
ANION GAP SERPL CALC-SCNC: 12 MMOL/L (ref 7–16)
BUN SERPL-MCNC: 26 MG/DL (ref 8–23)
CALCIUM SERPL-MCNC: 8.5 MG/DL (ref 8.8–10.2)
CHLORIDE SERPL-SCNC: 105 MMOL/L (ref 98–107)
CO2 SERPL-SCNC: 23 MMOL/L (ref 20–29)
CREAT SERPL-MCNC: 1.67 MG/DL (ref 0.8–1.3)
EKG ATRIAL RATE: 89 BPM
EKG DIAGNOSIS: NORMAL
EKG P AXIS: 21 DEGREES
EKG P-R INTERVAL: 136 MS
EKG Q-T INTERVAL: 356 MS
EKG QRS DURATION: 74 MS
EKG QTC CALCULATION (BAZETT): 433 MS
EKG R AXIS: 7 DEGREES
EKG T AXIS: 15 DEGREES
EKG VENTRICULAR RATE: 89 BPM
GLUCOSE SERPL-MCNC: 100 MG/DL (ref 70–99)
POTASSIUM SERPL-SCNC: 4.5 MMOL/L (ref 3.5–5.1)
SODIUM SERPL-SCNC: 139 MMOL/L (ref 136–145)
VANCOMYCIN SERPL-MCNC: 22 UG/ML
VIT B1 BLD-SCNC: 90.3 NMOL/L (ref 66.5–200)

## 2025-07-25 PROCEDURE — 6360000002 HC RX W HCPCS: Performed by: NURSE PRACTITIONER

## 2025-07-25 PROCEDURE — 2500000003 HC RX 250 WO HCPCS: Performed by: STUDENT IN AN ORGANIZED HEALTH CARE EDUCATION/TRAINING PROGRAM

## 2025-07-25 PROCEDURE — 6370000000 HC RX 637 (ALT 250 FOR IP): Performed by: STUDENT IN AN ORGANIZED HEALTH CARE EDUCATION/TRAINING PROGRAM

## 2025-07-25 PROCEDURE — 92523 SPEECH SOUND LANG COMPREHEN: CPT

## 2025-07-25 PROCEDURE — 36415 COLL VENOUS BLD VENIPUNCTURE: CPT

## 2025-07-25 PROCEDURE — 1180000000 HC REHAB R&B

## 2025-07-25 PROCEDURE — 99222 1ST HOSP IP/OBS MODERATE 55: CPT | Performed by: STUDENT IN AN ORGANIZED HEALTH CARE EDUCATION/TRAINING PROGRAM

## 2025-07-25 PROCEDURE — 97530 THERAPEUTIC ACTIVITIES: CPT

## 2025-07-25 PROCEDURE — 97542 WHEELCHAIR MNGMENT TRAINING: CPT

## 2025-07-25 PROCEDURE — 6360000002 HC RX W HCPCS

## 2025-07-25 PROCEDURE — 97535 SELF CARE MNGMENT TRAINING: CPT

## 2025-07-25 PROCEDURE — 76937 US GUIDE VASCULAR ACCESS: CPT

## 2025-07-25 PROCEDURE — 6360000002 HC RX W HCPCS: Performed by: STUDENT IN AN ORGANIZED HEALTH CARE EDUCATION/TRAINING PROGRAM

## 2025-07-25 PROCEDURE — 80202 ASSAY OF VANCOMYCIN: CPT

## 2025-07-25 PROCEDURE — 97166 OT EVAL MOD COMPLEX 45 MIN: CPT

## 2025-07-25 PROCEDURE — 2500000003 HC RX 250 WO HCPCS

## 2025-07-25 PROCEDURE — 93010 ELECTROCARDIOGRAM REPORT: CPT | Performed by: INTERNAL MEDICINE

## 2025-07-25 PROCEDURE — 80048 BASIC METABOLIC PNL TOTAL CA: CPT

## 2025-07-25 PROCEDURE — 2580000003 HC RX 258: Performed by: STUDENT IN AN ORGANIZED HEALTH CARE EDUCATION/TRAINING PROGRAM

## 2025-07-25 PROCEDURE — 2580000003 HC RX 258: Performed by: NURSE PRACTITIONER

## 2025-07-25 PROCEDURE — 97163 PT EVAL HIGH COMPLEX 45 MIN: CPT

## 2025-07-25 PROCEDURE — 93005 ELECTROCARDIOGRAM TRACING: CPT | Performed by: STUDENT IN AN ORGANIZED HEALTH CARE EDUCATION/TRAINING PROGRAM

## 2025-07-25 RX ORDER — ONDANSETRON 4 MG/1
4 TABLET, ORALLY DISINTEGRATING ORAL EVERY 8 HOURS PRN
Status: CANCELLED | OUTPATIENT
Start: 2025-07-25

## 2025-07-25 RX ORDER — MINERAL OIL/HYDROPHIL PETROLAT
OINTMENT (GRAM) TOPICAL 2 TIMES DAILY PRN
Status: DISPENSED | OUTPATIENT
Start: 2025-07-25

## 2025-07-25 RX ORDER — MINERAL OIL/HYDROPHIL PETROLAT
OINTMENT (GRAM) TOPICAL 2 TIMES DAILY PRN
Status: CANCELLED | OUTPATIENT
Start: 2025-07-25

## 2025-07-25 RX ORDER — MENTHOL/CAMPHOR/ALLANTOIN/PHE 0.6-0.5-1%
OINTMENT(EA) TOPICAL PRN
Status: ACTIVE | OUTPATIENT
Start: 2025-07-25

## 2025-07-25 RX ORDER — SENNA AND DOCUSATE SODIUM 50; 8.6 MG/1; MG/1
1 TABLET, FILM COATED ORAL
Status: DISCONTINUED | OUTPATIENT
Start: 2025-07-25 | End: 2025-07-25

## 2025-07-25 RX ORDER — CYANOCOBALAMIN 1000 UG/ML
1000 INJECTION, SOLUTION INTRAMUSCULAR; SUBCUTANEOUS DAILY
Status: CANCELLED | OUTPATIENT
Start: 2025-07-25 | End: 2025-07-30

## 2025-07-25 RX ORDER — SODIUM CHLORIDE 0.9 % (FLUSH) 0.9 %
5-40 SYRINGE (ML) INJECTION EVERY 12 HOURS SCHEDULED
Status: ACTIVE | OUTPATIENT
Start: 2025-07-25

## 2025-07-25 RX ORDER — SODIUM CHLORIDE 0.9 % (FLUSH) 0.9 %
5-40 SYRINGE (ML) INJECTION PRN
Status: CANCELLED | OUTPATIENT
Start: 2025-07-25

## 2025-07-25 RX ORDER — BISACODYL 10 MG
10 SUPPOSITORY, RECTAL RECTAL DAILY PRN
Status: ACTIVE | OUTPATIENT
Start: 2025-07-25

## 2025-07-25 RX ORDER — BISACODYL 10 MG
10 SUPPOSITORY, RECTAL RECTAL DAILY PRN
Status: CANCELLED | OUTPATIENT
Start: 2025-07-25

## 2025-07-25 RX ORDER — SODIUM CHLORIDE 0.9 % (FLUSH) 0.9 %
5-40 SYRINGE (ML) INJECTION PRN
Status: ACTIVE | OUTPATIENT
Start: 2025-07-25

## 2025-07-25 RX ORDER — SODIUM PHOSPHATE, DIBASIC AND SODIUM PHOSPHATE, MONOBASIC 7; 19 G/230ML; G/230ML
1 ENEMA RECTAL DAILY PRN
Status: CANCELLED | OUTPATIENT
Start: 2025-07-25

## 2025-07-25 RX ORDER — CYANOCOBALAMIN 1000 UG/ML
1000 INJECTION, SOLUTION INTRAMUSCULAR; SUBCUTANEOUS DAILY
Status: COMPLETED | OUTPATIENT
Start: 2025-07-26 | End: 2025-07-29

## 2025-07-25 RX ORDER — THIAMINE HYDROCHLORIDE 100 MG/ML
500 INJECTION, SOLUTION INTRAMUSCULAR; INTRAVENOUS 3 TIMES DAILY
Status: CANCELLED | OUTPATIENT
Start: 2025-07-25 | End: 2025-07-30

## 2025-07-25 RX ORDER — CYANOCOBALAMIN 1000 UG/ML
1000 INJECTION, SOLUTION INTRAMUSCULAR; SUBCUTANEOUS
Status: ACTIVE | OUTPATIENT
Start: 2025-08-27

## 2025-07-25 RX ORDER — POLYETHYLENE GLYCOL 3350 17 G/17G
17 POWDER, FOR SOLUTION ORAL DAILY PRN
Status: ACTIVE | OUTPATIENT
Start: 2025-07-25

## 2025-07-25 RX ORDER — GABAPENTIN 100 MG/1
100 CAPSULE ORAL NIGHTLY
Status: DISCONTINUED | OUTPATIENT
Start: 2025-07-25 | End: 2025-07-30

## 2025-07-25 RX ORDER — ONDANSETRON 4 MG/1
4 TABLET, ORALLY DISINTEGRATING ORAL EVERY 8 HOURS PRN
Status: ACTIVE | OUTPATIENT
Start: 2025-07-25

## 2025-07-25 RX ORDER — SODIUM PHOSPHATE, DIBASIC AND SODIUM PHOSPHATE, MONOBASIC 7; 19 G/230ML; G/230ML
1 ENEMA RECTAL DAILY PRN
Status: DISPENSED | OUTPATIENT
Start: 2025-07-25

## 2025-07-25 RX ORDER — CALCIUM CARBONATE 500 MG/1
500 TABLET, CHEWABLE ORAL 3 TIMES DAILY PRN
Status: CANCELLED | OUTPATIENT
Start: 2025-07-25

## 2025-07-25 RX ORDER — SODIUM CHLORIDE 0.9 % (FLUSH) 0.9 %
5-40 SYRINGE (ML) INJECTION EVERY 12 HOURS SCHEDULED
Status: CANCELLED | OUTPATIENT
Start: 2025-07-25

## 2025-07-25 RX ORDER — MENTHOL/CAMPHOR/ALLANTOIN/PHE 0.6-0.5-1%
OINTMENT(EA) TOPICAL PRN
Status: CANCELLED | OUTPATIENT
Start: 2025-07-25

## 2025-07-25 RX ORDER — ACETAMINOPHEN 325 MG/1
650 TABLET ORAL EVERY 4 HOURS PRN
Status: CANCELLED | OUTPATIENT
Start: 2025-07-25

## 2025-07-25 RX ORDER — CARBOXYMETHYLCELLULOSE SODIUM 10 MG/ML
1 GEL OPHTHALMIC 3 TIMES DAILY PRN
Status: CANCELLED | OUTPATIENT
Start: 2025-07-25

## 2025-07-25 RX ORDER — HYDRALAZINE HYDROCHLORIDE 10 MG/1
10 TABLET, FILM COATED ORAL 3 TIMES DAILY PRN
Status: CANCELLED | OUTPATIENT
Start: 2025-07-25

## 2025-07-25 RX ORDER — THIAMINE MONONITRATE (VIT B1) 100 MG
100 TABLET ORAL DAILY
Qty: 30 TABLET | Refills: 0 | Status: ON HOLD
Start: 2025-07-25

## 2025-07-25 RX ORDER — CYANOCOBALAMIN 1000 UG/ML
1000 INJECTION, SOLUTION INTRAMUSCULAR; SUBCUTANEOUS
Status: CANCELLED | OUTPATIENT
Start: 2025-08-27

## 2025-07-25 RX ORDER — HYDRALAZINE HYDROCHLORIDE 10 MG/1
10 TABLET, FILM COATED ORAL 3 TIMES DAILY PRN
Status: ACTIVE | OUTPATIENT
Start: 2025-07-25

## 2025-07-25 RX ORDER — ACETAMINOPHEN 325 MG/1
650 TABLET ORAL EVERY 4 HOURS PRN
Status: DISPENSED | OUTPATIENT
Start: 2025-07-25

## 2025-07-25 RX ORDER — CYANOCOBALAMIN 1000 UG/ML
1000 INJECTION, SOLUTION INTRAMUSCULAR; SUBCUTANEOUS WEEKLY
Status: CANCELLED | OUTPATIENT
Start: 2025-07-30 | End: 2025-08-27

## 2025-07-25 RX ORDER — POLYETHYLENE GLYCOL 3350 17 G/17G
17 POWDER, FOR SOLUTION ORAL DAILY PRN
Status: CANCELLED | OUTPATIENT
Start: 2025-07-25

## 2025-07-25 RX ORDER — SENNA AND DOCUSATE SODIUM 50; 8.6 MG/1; MG/1
1 TABLET, FILM COATED ORAL
Status: CANCELLED | OUTPATIENT
Start: 2025-07-25

## 2025-07-25 RX ORDER — CALCIUM CARBONATE 500 MG/1
500 TABLET, CHEWABLE ORAL 3 TIMES DAILY PRN
Status: ACTIVE | OUTPATIENT
Start: 2025-07-25

## 2025-07-25 RX ORDER — CYANOCOBALAMIN 1000 UG/ML
1000 INJECTION, SOLUTION INTRAMUSCULAR; SUBCUTANEOUS WEEKLY
Status: DISPENSED | OUTPATIENT
Start: 2025-07-30 | End: 2025-08-27

## 2025-07-25 RX ORDER — CARBOXYMETHYLCELLULOSE SODIUM 10 MG/ML
1 GEL OPHTHALMIC 3 TIMES DAILY PRN
Status: DISPENSED | OUTPATIENT
Start: 2025-07-25

## 2025-07-25 RX ORDER — THIAMINE HYDROCHLORIDE 100 MG/ML
500 INJECTION, SOLUTION INTRAMUSCULAR; INTRAVENOUS 3 TIMES DAILY
Status: COMPLETED | OUTPATIENT
Start: 2025-07-25 | End: 2025-07-29

## 2025-07-25 RX ORDER — SENNA AND DOCUSATE SODIUM 50; 8.6 MG/1; MG/1
2 TABLET, FILM COATED ORAL
Status: DISCONTINUED | OUTPATIENT
Start: 2025-07-25 | End: 2025-07-29

## 2025-07-25 RX ADMIN — CYANOCOBALAMIN 1000 MCG: 1000 INJECTION, SOLUTION INTRAMUSCULAR; SUBCUTANEOUS at 09:00

## 2025-07-25 RX ADMIN — THIAMINE HYDROCHLORIDE 500 MG: 100 INJECTION, SOLUTION INTRAMUSCULAR; INTRAVENOUS at 21:00

## 2025-07-25 RX ADMIN — SODIUM CHLORIDE, PRESERVATIVE FREE 10 ML: 5 INJECTION INTRAVENOUS at 21:01

## 2025-07-25 RX ADMIN — MEROPENEM 2000 MG: 1 INJECTION INTRAVENOUS at 10:55

## 2025-07-25 RX ADMIN — VANCOMYCIN HYDROCHLORIDE 1500 MG: 10 INJECTION, POWDER, LYOPHILIZED, FOR SOLUTION INTRAVENOUS at 17:07

## 2025-07-25 RX ADMIN — MEROPENEM 2000 MG: 1 INJECTION INTRAVENOUS at 00:28

## 2025-07-25 RX ADMIN — THIAMINE HYDROCHLORIDE 500 MG: 100 INJECTION, SOLUTION INTRAMUSCULAR; INTRAVENOUS at 09:00

## 2025-07-25 RX ADMIN — DOCUSATE SODIUM 50 MG AND SENNOSIDES 8.6 MG 2 TABLET: 8.6; 5 TABLET, FILM COATED ORAL at 21:01

## 2025-07-25 RX ADMIN — THIAMINE HYDROCHLORIDE 500 MG: 100 INJECTION, SOLUTION INTRAMUSCULAR; INTRAVENOUS at 17:05

## 2025-07-25 RX ADMIN — GABAPENTIN 100 MG: 100 CAPSULE ORAL at 21:00

## 2025-07-25 RX ADMIN — Medication 3 MG: at 21:00

## 2025-07-25 RX ADMIN — SODIUM CHLORIDE, PRESERVATIVE FREE 5 ML: 5 INJECTION INTRAVENOUS at 09:00

## 2025-07-25 RX ADMIN — MEROPENEM 2000 MG: 1 INJECTION INTRAVENOUS at 23:27

## 2025-07-25 ASSESSMENT — PAIN SCALES - GENERAL: PAINLEVEL_OUTOF10: 0

## 2025-07-25 NOTE — PROGRESS NOTES
VANCO DAILY FOLLOW UP NOTE  [facility]   Pharmacy Pharmacokinetic Monitoring Service - Vancomycin    Consulting Provider: Dr. Oneill   Indication: CNS  Target Concentration: Goal AUC/JEANNE 400-600 mg*hr/L  Day of Therapy: 8  Additional Antimicrobials: meropenem    Pertinent Laboratory Values:   Wt Readings from Last 1 Encounters:   07/14/25 93 kg (205 lb)     Temp Readings from Last 1 Encounters:   07/25/25 97.5 °F (36.4 °C) (Oral)     Recent Labs     07/22/25  1647 07/25/25  0319   BUN 25* 26*   CREATININE 1.36* 1.67*   WBC 7.5  --      CrCl cannot be calculated (Unknown ideal weight.).    Lab Results   Component Value Date/Time    VANCORANDOM 22.0 07/25/2025 03:19 AM       MRSA Nasal Swab: N/A. Non-respiratory infection    Assessment:  Date/Time Dose Concentration AUC   7/20 0652 1250 mg q24h 10.4 488   7/22 0628 1250 mg q24h 11.4 379   7/25 0319 1250 mg q18h 22 621   Note: Serum concentrations collected for AUC dosing may appear elevated if collected in close proximity to the dose administered, this is not necessarily an indication of toxicity    Plan:  Will continue dosing prior to inpatient rehab admission as highlighted by the plan below  Current dosing regimen is supra-therapeutic  Decrease dose to 1500 mg IV every 24 hours for predicted AUC/Tr of 560/16.1  Repeat vancomycin concentrations will be ordered as clinically appropriate   Pharmacy will continue to monitor patient and adjust therapy as indicated    Thank you for the consult,  Maximus Schumacher RPH

## 2025-07-25 NOTE — PROGRESS NOTES
VANCO DAILY FOLLOW UP NOTE  [facility]   Pharmacy Pharmacokinetic Monitoring Service - Vancomycin    Consulting Provider: Dr. Cuenca   Indication: CNS infection  Target Concentration: Goal AUC/JEANNE 400-600 mg*hr/L  Day of Therapy: 8  Additional Antimicrobials: meropenem    Pertinent Laboratory Values:   Wt Readings from Last 1 Encounters:   07/14/25 93 kg (205 lb)     Temp Readings from Last 1 Encounters:   07/25/25 98.1 °F (36.7 °C)     Recent Labs     07/22/25  1647 07/25/25  0319   BUN 25* 26*   CREATININE 1.36* 1.67*   WBC 7.5  --      Estimated Creatinine Clearance: 45 mL/min (A) (based on SCr of 1.67 mg/dL (H)).    Lab Results   Component Value Date/Time    VANCORANDOM 22.0 07/25/2025 03:19 AM     MRSA Nasal Swab: N/A. Non-respiratory infection    Assessment:  Date/Time Dose Concentration AUC   7/20 0652 1250 mg q24h 10.4 488   7/22 0628 1250 mg q24h 11.4 379   7/25 0319 1250 mg q18h 22 621   Note: Serum concentrations collected for AUC dosing may appear elevated if collected in close proximity to the dose administered, this is not necessarily an indication of toxicity    Plan:  Current dosing regimen is supra-therapeutic  Decrease dose to 1500mg q24h for predicted AUC/Tr of 560/16.1  Repeat vancomycin concentrations will be ordered as clinically appropriate   Pharmacy will continue to monitor patient and adjust therapy as indicated    Thank you for the consult,  Julio Zavaleta RPH

## 2025-07-25 NOTE — THERAPY EVALUATION
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 decreased orientation, attention, initiation, sequencing, immediate and delayed recall.  requires increased time and prompting with episodes of no response. he has no insight into his deficits. patient functioning significantly below baseline. recommend speech therapy to address aforementioned deficits in order to improve safety and independence.      GENERAL   Subjective:  \"chela\". Wife at bedside.       Prior level of functioning: lives with wife and son. Wife drives, manages bills, appointments, medications. Reports patient had \"a little\" cognitive difficulty prior to admission. Reports this is a significant decline from his baseline.   Patient is retired. 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    PLAN    Duration/Frequency: Continue to follow patient 4-5 x/week for duration of inpatient rehab stay to address goals listed or until goals met.     Medical Necessity/Other Comments:   Patient is expected to demonstrate progress in cognitive ability to decrease assistance required communication, increase independence with activities of daily living, and increase communication with family/caregivers.    GOALS:   LTG: Patient will develop functional attention skills to effectively attend to and communicate in functional living environment to REJI level 4  LTG: Patient will improve neuro-linguistic abilities to increase safety and independence in functional living environment   STG: Caregiver will verbalize understanding of speech therapy recommendations, applicable compensatory strategies, and suggested techniques to facilitate functional performance, safety, independence   STG: Patient will verbally sequence steps to complete daily task with min cueing   STG: Patient will state orientation information/personal information given prompt for external memory aid use as needed   STG: Patient will use external memory aids to recall daily activities completed with moderate verbal cues  STG: Patient will initiate a response to questions/directions with no more than 2 prompts on >50% of occasions   STG: Patient will immediately repeat back instructions/relevant list/information given moderate cue    PATIENT EDUCATION: Education provided to patient and wife on the following topics: cognitive communication function and role of SLP  Wife verbalized understanding.   Interdisciplinary Collaboration: MD and OT    Safety: in chair, alarm on, virtual sitter, wife at bedside.    Therapy Time  SLP Individual Minutes  Time In: 1150  Time Out: 1220  Minutes: 30          ODELL Horn, CCC-SLP    7/25/2025 12:56 PM

## 2025-07-25 NOTE — PLAN OF CARE
Problem: Pain  Goal: Verbalizes/displays adequate comfort level or baseline comfort level  7/25/2025 0851 by Tatyana Guerrero RN  Outcome: Progressing  Flowsheets (Taken 7/25/2025 0800)  Verbalizes/displays adequate comfort level or baseline comfort level: Encourage patient to monitor pain and request assistance  7/25/2025 0231 by Jude Lopez RN  Outcome: Progressing     Problem: Discharge Planning  Goal: Discharge to home or other facility with appropriate resources  7/25/2025 0851 by Tatyana Guerrero RN  Outcome: Progressing  7/25/2025 0231 by Jude Lopez RN  Outcome: Progressing     Problem: Safety - Adult  Goal: Free from fall injury  7/25/2025 0851 by Tatyana Guerrero RN  Outcome: Progressing  7/25/2025 0231 by Jude Lopez RN  Outcome: Progressing     Problem: ABCDS Injury Assessment  Goal: Absence of physical injury  7/25/2025 0851 by Tatyana Guerrero RN  Outcome: Progressing  7/25/2025 0231 by Jude Lopez RN  Outcome: Progressing     Problem: Skin/Tissue Integrity  Goal: Skin integrity remains intact  Description: 1.  Monitor for areas of redness and/or skin breakdown  2.  Assess vascular access sites hourly  3.  Every 4-6 hours minimum:  Change oxygen saturation probe site  4.  Every 4-6 hours:  If on nasal continuous positive airway pressure, respiratory therapy assess nares and determine need for appliance change or resting period  7/25/2025 0851 by Tatyana Guerrero RN  Outcome: Progressing  7/25/2025 0231 by Jude Lopez RN  Outcome: Progressing     Problem: Neurosensory - Adult  Goal: Achieves maximal functionality and self care  7/25/2025 0851 by Tatyana Guerrero RN  Outcome: Progressing  7/25/2025 0231 by Jude Lopez RN  Outcome: Progressing     Problem: Respiratory - Adult  Goal: Achieves optimal ventilation and oxygenation  7/25/2025 0851 by Tatyana Guerrero RN  Outcome: Progressing  7/25/2025 0231 by John

## 2025-07-25 NOTE — DISCHARGE SUMMARY
Mr. May is medically stable for discharge today to [facility]. Please see discharge summary from 7/24. Will need to continue 2 total weeks of IV antibiotics. Please see ID notes if needed for more information. All questions and concerns addressed with Mr. May before discharge to [facility] today.

## 2025-07-25 NOTE — H&P
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 *      Acute Rehab Diagnoses:  Encounter for rehabilitation [Z51.89]   Abnormality of gait and mobility [R26.9]  Decreased independence for activities of daily living (ADL) [Z78.9]  Physical debility / deconditioning [R53.81]      Medical Dx:  Past Medical History:   Diagnosis Date    Allergic rhinitis     Bilateral impacted cerumen     BMI 30.0-30.9,adult     BMI 30.3    Cancer (HCC)     prostate cancer- surgery    Claustrophobia     mild    Family history of colon cancer     mother and sister    GERD (gastroesophageal reflux disease)     hx of nissen fundoplication and managed with diet    History of Gatica's esophagus     History of stomach ulcers     Hx of colonic polyps 2016    adenomas    Hypertension     no meds    IBS (irritable bowel syndrome)     no current medications    Insomnia     Insomnia     Laryngopharyngeal reflux     Neck mass     Post-nasal drainage     Pure hypercholesterolemia 2/16/2018    Urinary incontinence        Date of Evaluation: July 25, 2025      HPI: Azam May Jr. is a 75 y.o. male patient who presented to [facility] on 7/14/2025 secondary to elective lumbar drain placement for NPH.  Per H&P \"A 75-year-old male who has been following with neurology for almost 7-month presented to the hospital with memory, gait changes associated with urinary incontinence. Patient was admitted initially by neurosurgery as the MRI was concerning for new normal pressure hydrocephalus. A trial of lumbar drain was  injection 1,000 mcg  1,000 mcg IntraMUSCular Weekly    Followed by    [START ON 8/27/2025] cyanocobalamin injection 1,000 mcg  1,000 mcg IntraMUSCular Q30 Days    sodium chloride flush 0.9 % injection 5-40 mL  5-40 mL IntraVENous 2 times per day    sodium chloride flush 0.9 % injection 5-40 mL  5-40 mL IntraVENous PRN    thiamine (B-1) injection 500 mg  500 mg IntraVENous TID    acetaminophen (TYLENOL) tablet 650 mg  650 mg Oral Q4H PRN    polyethylene glycol (GLYCOLAX) packet 17 g  17 g Oral Daily PRN    sennosides-docusate sodium (SENOKOT-S) 8.6-50 MG tablet 1 tablet  1 tablet Oral QHS    sodium phosphate (FLEET) rectal enema 1 enema  1 enema Rectal Daily PRN    bisacodyl (DULCOLAX) suppository 10 mg  10 mg Rectal Daily PRN    calcium carbonate (TUMS) chewable tablet 500 mg  500 mg Oral TID PRN    hydrALAZINE (APRESOLINE) tablet 10 mg  10 mg Oral TID PRN    ondansetron (ZOFRAN-ODT) disintegrating tablet 4 mg  4 mg Oral Q8H PRN    carboxymethylcellulose (THERATEARS) 1 % ophthalmic gel 1 drop  1 drop Both Eyes TID PRN    Benzocaine-Menthol (CEPACOL SORE THROAT) 15-2.6 MG lozenge 1 lozenge  1 lozenge Oral Q2H PRN    medicated lip ointment (BLISTEX)   Topical PRN    mineral oil-hydrophilic petrolatum (AQUAPHOR) ointment   Topical BID PRN    sodium chloride (OCEAN, BABY AYR) 0.65 % nasal spray 1 spray  1 spray Each Nostril PRN    melatonin tablet 3 mg  3 mg Oral QHS    vancomycin (VANCOCIN) 1500 mg in sodium chloride 0.9 % 250 mL IVPB  1,500 mg IntraVENous Q24H    meropenem (MERREM) 2,000 mg in sodium chloride 0.9 % 100 mL IVPB  2,000 mg IntraVENous Q12H       Review of Systems:   General: Denies malaise, fatigue  HEENT: + Headache  Cardiovascular: Denies chest pain, palpitations.  Respiratory: Denies dyspnea, cough  Gastrointestinal: Denies abdominal pain, diarrhea  Urological: Denies dysuria, urinary frequency.  Musculoskeletal: Denies new myalgias, new arthralgias.  Neurological: Denies new weakness, new sensory  which were/will be taught and implemented at [facility], particularly balance, strength, and gait training.  Avoid if possible the following medications which can contribute to delirium: benzodiazepines, antihistamines, and anticholinergics  Avoid sleep disruption by rescheduling early morning blood draws, avoiding middle of the night vital signs or disturbances,  minimizing noise.   Keep  lights on and blinds open during the day; lights and TV off at night; minimize tethers including urinary catheters; use glasses, hearing aids, and dentures as appropriate.  Avoid long periods of sleep during the day. Brief naps can be helpful but long periods of sleep can disrupt the sleep wake cycle for this patient.  Reorientation and visual cues for orientation should be tried  Maintain hydration and bowel function        Disposition: TBD    Patient exhibits the ability to tolerate active participation in at least three hours of therapy services per day, five days a week to include Physical Therapy and Occupational Therapy in addition to SLP if indicated. Furthermore, underlying medical problems present potential risk for decompensation and therefore requires continued close physician monitoring.  Other medical complications include but are not limited to: thromboembolism / pulmonary embolism, skin breakdown, pneumonia due to decreased mobility, CVA, MI, cardiac arrhythmias due to HTN, postural hypotension. Patient continues to require 24-hour rehabilitation nursing and CM assistance to help manage with patients progress, plan of care and overall safe discharge; this care can only be accomplished within the acute rehabilitation setting while addressing functional needs. Rehabilitation services could not be safely provided at a lower level of care such as a skilled nursing facility or nursing home.     Greater than 55 minutes were spent in direct patient care, discussing current medications, medical conditions, dispo planning, and

## 2025-07-25 NOTE — PROGRESS NOTES
PHYSICAL THERAPY EXAMINATION    PT Individual Minutes  Time In: 1355  Time Out: 1455  Minutes: 60                   Total Treatment Time:  60 Minutes         HPI:  75-year-old male who has been following with neurology for almost 7-month presented to the hospital with memory, gait changes associated with urinary incontinence. Patient was admitted initially by neurosurgery as the MRI was concerning for new normal pressure hydrocephalus and trial of lumbar drain. Pt pulled drain and it was replaced but several days later pt had increased confusion.  ID team was consulted for concern of meningitis and possible infection post op. Pt removed lumbar drain again.  CT head concerning for ventriculomegaly and atherosclerosis. Chest x-ray was concerning for atelectasis versus pneumonia and showing small pleural effusion.     Past Medical History:   Past Medical History:   Diagnosis Date    Allergic rhinitis     Bilateral impacted cerumen     BMI 30.0-30.9,adult     BMI 30.3    Cancer (HCC)     prostate cancer- surgery    Claustrophobia     mild    Family history of colon cancer     mother and sister    GERD (gastroesophageal reflux disease)     hx of nissen fundoplication and managed with diet    History of Gatica's esophagus     History of stomach ulcers     Hx of colonic polyps 2016    adenomas    Hypertension     no meds    IBS (irritable bowel syndrome)     no current medications    Insomnia     Insomnia     Laryngopharyngeal reflux     Neck mass     Post-nasal drainage     Pure hypercholesterolemia 2/16/2018    Urinary incontinence        Pt's Goal:  Unable to state      Precautions:    Restrictions/Precautions: Fall Risk, Bed Alarm              Impairments:    Decreased LE strength  [x]     Decreased strength trunk/core  [x]     Decreased AROM   [x]     Decreased PROM  [x]    Decreased endurance  [x]     Decreased balance sitting  [x]     Decreased balance standing  [x]     Pain   [x]     Slow ambulation velocity  [x]   Dr. Tyrese Mayer [phone]

## 2025-07-25 NOTE — PROGRESS NOTES
[facility] OCCUPATIONAL THERAPY INITIAL NEURO EVALUATION  OT Individual Minutes  Time In: 1310  Time Out: 1353  Minutes: 43               HPI (per MD report): \"Azam May Jr. is a 75 y.o. male patient who presented to [facility] on 7/14/2025 secondary to elective lumbar drain placement for NPH. Per H&P \"A 75-year-old male who has been following with neurology for almost 7-month presented to the hospital with memory, gait changes associated with urinary incontinence. Patient was admitted initially by neurosurgery as the MRI was concerning for new normal pressure hydrocephalus. A trial of lumbar drain was done placed on July 15, 2025 and concern of dislodgment was raised earlier this week. Yesterday the patient woke up with acute confusion associated with fever; nausea and vomiting. Patient was reporting chills and rigor blood culture of 2 sets were sent and they are negative for 18 and 16 hours so far. CSF culture and Gram stain is also pending CSF routine is high of 172 and white cell count of 1250 with neutrophils of 98% patient was placed on IV vancomycin IV cefepime IV ampicillin and IV Flagyl. ID team consulted for concern of meningitis versus encephalitis ampicillin and Flagyl were stopped and the switch to meropenem 2 g IV every 12 hours. CT head was concerning for ventriculomegaly and atherosclerosis. Chest x-ray was concerning for atelectasis versus pneumonia and showing small pleural effusion. Patient's blood and CSF culture are negative.\" Due to ongoing functional deficits, PM&R consulted to evaluate patient's rehabilitation and ongoing medical needs to determine best suited placement for patient once discharged from acute care. \"  Past Medical History:   Diagnosis Date    Allergic rhinitis     Bilateral impacted cerumen     BMI 30.0-30.9,adult     BMI 30.3    Cancer (HCC)     prostate cancer- surgery    Claustrophobia     mild    Family history of colon cancer     mother and sister    GERD

## 2025-07-25 NOTE — PROGRESS NOTES
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  Meningitis PCR negative.  Blood cx x 2  negative   Syphilis screen non-reactive   Normal pressure hydrocephalus    Outpatient memory, gait, incontinence issues for 6-7 months.  Admitted for lumbar drain trial--placed 25 but now removed in setting above.    Encephalopathy in setting above   Mentation seems to fluctuate.  Could be multifactorial but seems much improved over the past couple of days.     CT head  with no acute abnormality but did note mild global generalized brain volume loss with minimal chronic small vessel ischemic changes and ventricular enlargement     Neurology was consulted  but has signed off.  Recommendations for delirium protocol given.    CKD  Can impact antibiotic dosing  Fever in setting above:  improving/resolving    History of prostate cancer     Plan   Continue Vancomycin pharmacy to dose and Merrem for CNS dosing/renally adjusted for renal function (current dose 2 grams Q 12 hours per pharmacy) as there are no other optimal options to transition to in the setting of treating meningitis with unknown organism and for brain penetration, EOT 25 to complete a total of 2 weeks abx therapy.       Monitor CBCd, creatinine, LFTs, and Vanc trough closely while patient is receiving Vanc/Merrem.  Patient has had fluctuating CrCl and pharmacy is adjusting abx dosing frequency as indicated based on renal function.         In the event patient has neurological decline, ongoing worsening confusion, s/sx associated with neurologic infection in the future, would recommend restarting work-up, brain  by PCR NOT DETECTED        Herpes simplex virus 1 CSF by PCR NOT DETECTED        Herpes simplex virus 2 CSF by PCR NOT DETECTED        Human herpesvirus 6 CSF by PCR NOT DETECTED        Human parechovirus CSF by PCR NOT DETECTED        Varicella zoster virus CSF by PCR NOT DETECTED        Cryptococcus neoformans/gattii CSF by PCR NOT DETECTED       Respiratory Panel, Molecular, with COVID-19 (Restricted: peds pts or suitable admitted adults) [[id number]] Collected: 07/18/25 1115    Order Status: Completed Specimen: Nasopharyngeal Updated: 07/18/25 1309     Adenovirus by PCR NOT DETECTED        Coronavirus 229E by PCR NOT DETECTED        Coronavirus HKU1 by PCR NOT DETECTED        Coronavirus NL63 by PCR NOT DETECTED        Coronavirus OC43 by PCR NOT DETECTED        SARS-CoV-2, PCR NOT DETECTED        Human Metapneumovirus by PCR NOT DETECTED        Rhinovirus Enterovirus PCR NOT DETECTED        Influenza A by PCR NOT DETECTED        Influenza B PCR NOT DETECTED        Parainfluenza 1 PCR NOT DETECTED        Parainfluenza 2 PCR NOT DETECTED        Parainfluenza 3 PCR NOT DETECTED        Parainfluenza 4 PCR NOT DETECTED        Respiratory Syncytial Virus by PCR NOT DETECTED        Bordetella parapertussis by PCR NOT DETECTED        Bordetella pertussis by PCR NOT DETECTED        Chlamydophila Pneumonia PCR NOT DETECTED        Mycoplasma pneumo by PCR NOT DETECTED       Gram stain CSF [[id number]] Collected: 07/18/25 0945    Order Status: Canceled Specimen: CSF Shunt             Imaging:       Signed By: AWA Russo - CNP     July 25, 2025

## 2025-07-26 PROCEDURE — 6370000000 HC RX 637 (ALT 250 FOR IP): Performed by: STUDENT IN AN ORGANIZED HEALTH CARE EDUCATION/TRAINING PROGRAM

## 2025-07-26 PROCEDURE — 2500000003 HC RX 250 WO HCPCS: Performed by: STUDENT IN AN ORGANIZED HEALTH CARE EDUCATION/TRAINING PROGRAM

## 2025-07-26 PROCEDURE — 1180000000 HC REHAB R&B

## 2025-07-26 PROCEDURE — 97530 THERAPEUTIC ACTIVITIES: CPT

## 2025-07-26 PROCEDURE — 2580000003 HC RX 258: Performed by: NURSE PRACTITIONER

## 2025-07-26 PROCEDURE — 6360000002 HC RX W HCPCS: Performed by: STUDENT IN AN ORGANIZED HEALTH CARE EDUCATION/TRAINING PROGRAM

## 2025-07-26 PROCEDURE — 6360000002 HC RX W HCPCS: Performed by: NURSE PRACTITIONER

## 2025-07-26 PROCEDURE — 97116 GAIT TRAINING THERAPY: CPT

## 2025-07-26 PROCEDURE — 2580000003 HC RX 258: Performed by: STUDENT IN AN ORGANIZED HEALTH CARE EDUCATION/TRAINING PROGRAM

## 2025-07-26 RX ADMIN — SODIUM CHLORIDE, PRESERVATIVE FREE 10 ML: 5 INJECTION INTRAVENOUS at 21:20

## 2025-07-26 RX ADMIN — CYANOCOBALAMIN 1000 MCG: 1000 INJECTION, SOLUTION INTRAMUSCULAR; SUBCUTANEOUS at 09:54

## 2025-07-26 RX ADMIN — THIAMINE HYDROCHLORIDE 500 MG: 100 INJECTION, SOLUTION INTRAMUSCULAR; INTRAVENOUS at 21:20

## 2025-07-26 RX ADMIN — GABAPENTIN 100 MG: 100 CAPSULE ORAL at 21:17

## 2025-07-26 RX ADMIN — THIAMINE HYDROCHLORIDE 500 MG: 100 INJECTION, SOLUTION INTRAMUSCULAR; INTRAVENOUS at 09:55

## 2025-07-26 RX ADMIN — MEROPENEM 2000 MG: 1 INJECTION INTRAVENOUS at 10:09

## 2025-07-26 RX ADMIN — DOCUSATE SODIUM 50 MG AND SENNOSIDES 8.6 MG 2 TABLET: 8.6; 5 TABLET, FILM COATED ORAL at 21:17

## 2025-07-26 RX ADMIN — Medication 3 MG: at 21:17

## 2025-07-26 RX ADMIN — VANCOMYCIN HYDROCHLORIDE 1500 MG: 10 INJECTION, POWDER, LYOPHILIZED, FOR SOLUTION INTRAVENOUS at 18:29

## 2025-07-26 RX ADMIN — SODIUM CHLORIDE, PRESERVATIVE FREE 10 ML: 5 INJECTION INTRAVENOUS at 09:56

## 2025-07-26 RX ADMIN — THIAMINE HYDROCHLORIDE 500 MG: 100 INJECTION, SOLUTION INTRAMUSCULAR; INTRAVENOUS at 14:32

## 2025-07-26 RX ADMIN — MEROPENEM 2000 MG: 1 INJECTION INTRAVENOUS at 23:13

## 2025-07-26 RX ADMIN — SODIUM CHLORIDE, PRESERVATIVE FREE 10 ML: 5 INJECTION INTRAVENOUS at 14:32

## 2025-07-26 ASSESSMENT — PAIN SCALES - GENERAL
PAINLEVEL_OUTOF10: 0
PAINLEVEL_OUTOF10: 0

## 2025-07-26 NOTE — PROGRESS NOTES
Physical Therapy  PHYSICAL THERAPY DAILY NOTE    PT Individual Minutes  Time In: 1027  Time Out: 1112  Minutes: 45                 Total Treatment Time:  45 Minutes    Pt. Seen for: AM, Gait Training, Patient Education, Therapeutic Exercise, and Transfer Training     Subjective: \"I can do I know what to do\"         Objective:  Restrictions/Precautions: Fall Risk, Bed Alarm                        GROSS ASSESSMENT           COGNITION Daily Assessment    Overall Orientation Status: Impaired   Arousal/Alertness: Impaired  Following Commands: Impaired  Attention Span: Impaired  Memory: Impaired  Safety Judgement: Impaired  Problem Solving: Impaired  Insights: Decreased awareness of deficits        BED/MAT MOBILITY Daily Assessment     Bridging: Contact guard assistance  Rolling to Left: Minimal assistance  Rolling to Right: Minimal assistance  Supine to Sit: Minimal assistance  Sit to Supine: Partial/Moderate assistance  Scooting: Contact guard assistance        TRANSFERS Daily Assessment   Pt requires mod a with toilet due to decrease safety awarenes, requires extra time/ pt incontinent with bowels Sit to Stand: Partial/Moderate assistance  Stand to Sit: Partial/Moderate assistance  Bed to Chair: Partial/Moderate assistance  Stand Pivot Transfers: Partial/Moderate assistance          GAIT Daily Assessment    Surface: Level tile  Device: Rolling Walker  Assistance: Partial/Moderate assistance  Gait Deviations: Decreased step height;Decreased arm swing;Slow Marissa;Decreased head and trunk rotation;Decreased step length;Deviated path;Shuffles;Staggers  Distance: 12  More Ambulation?: Yes   Ambulation 2  Surface - 2: level tile  Device 2: Rolling Walker  Assistance 2: Partial/Moderate assistance  Gait Deviations: Decreased arm swing;Slow Marissa;Staggers;Decreased step height;Decreased step length;Deviated path;Decreased head and trunk rotation  Distance: 10          STEPS/STAIRS Daily Assessment    Stairs?: No               BALANCE Daily Assessment    Static Sitting: Good:  Pt. able to maintain balance w/o UE support;  exhibits some postural sway  Dynamic Sitting: Fair - accepts minimal challenge;  can maintain balance while turning head/trunk  Static Standing: Poor:  Pt. requires UE support & mod-max A to maintain position  Dynamic Standing: Poor - unable to accept challenge or move without LOB        WHEELCHAIR MOBILITY Daily Assessment                          LOWER EXTREMITY EXERCISES        Pain level: 2/10  Pain Location:  R foot  Pain Interventions: rest, positioning    Vital Signs:  WNL    Education:    Education Given To: Patient  Education Provided: Safety;Transfer Training;Role of Therapy;Energy Conservation;Fall Prevention Strategies;Mobility Training  Education Method: Demonstration  Barriers to Learning: Cognition       Pt. Left in bed call bell in reach needs in reach bed/chair alarm activated         Assessment: pt displays decrease safety awareness and awareness of cognitive deficits today. Pt unsteady on feet with transfers and gait and requires constant cues on proper use of walker         Plan of Care: Continue with POC to improve safe mobility    Sushma Webber, PTA  7/26/2025

## 2025-07-26 NOTE — PLAN OF CARE
Problem: Safety - Adult  Goal: Free from fall injury  7/26/2025 0929 by Stella Eaton, RN  Outcome: Progressing  7/25/2025 2132 by Britni Blanco, RN  Outcome: Progressing

## 2025-07-26 NOTE — PROGRESS NOTES
VANCO DAILY FOLLOW UP NOTE  [facility]   Pharmacy Pharmacokinetic Monitoring Service - Vancomycin    Consulting Provider: Dr. Oneill   Indication: CNS  Target Concentration: Goal AUC/JEANNE 400-600 mg*hr/L  Day of Therapy: 9  Additional Antimicrobials: meropenem    Pertinent Laboratory Values:   Wt Readings from Last 1 Encounters:   07/14/25 93 kg (205 lb)     Temp Readings from Last 1 Encounters:   07/26/25 97.9 °F (36.6 °C) (Oral)     Recent Labs     07/25/25  0319   BUN 26*   CREATININE 1.67*     CrCl cannot be calculated (Unknown ideal weight.).    Lab Results   Component Value Date/Time    VANCORANDOM 22.0 07/25/2025 03:19 AM       MRSA Nasal Swab: N/A. Non-respiratory infection    Assessment:  Date/Time Dose Concentration AUC   7/20 0652 1250 mg q24h 10.4 488   7/22 0628 1250 mg q24h 11.4 379   7/25 0319 1250 mg q18h 22 621   Note: Serum concentrations collected for AUC dosing may appear elevated if collected in close proximity to the dose administered, this is not necessarily an indication of toxicity    Plan:  Will continue the current regimen of 1500 mg IV every 24 hours  Repeat vancomycin concentrations will be ordered as clinically appropriate   Pharmacy will continue to monitor patient and adjust therapy as indicated    Thank you for the consult,  Maximus Schumacher RPH

## 2025-07-27 LAB — VANCOMYCIN SERPL-MCNC: 24 UG/ML

## 2025-07-27 PROCEDURE — 1180000000 HC REHAB R&B

## 2025-07-27 PROCEDURE — 80202 ASSAY OF VANCOMYCIN: CPT

## 2025-07-27 PROCEDURE — 2580000003 HC RX 258: Performed by: STUDENT IN AN ORGANIZED HEALTH CARE EDUCATION/TRAINING PROGRAM

## 2025-07-27 PROCEDURE — 6360000002 HC RX W HCPCS: Performed by: NURSE PRACTITIONER

## 2025-07-27 PROCEDURE — 6370000000 HC RX 637 (ALT 250 FOR IP)

## 2025-07-27 PROCEDURE — 2500000003 HC RX 250 WO HCPCS: Performed by: STUDENT IN AN ORGANIZED HEALTH CARE EDUCATION/TRAINING PROGRAM

## 2025-07-27 PROCEDURE — 6370000000 HC RX 637 (ALT 250 FOR IP): Performed by: STUDENT IN AN ORGANIZED HEALTH CARE EDUCATION/TRAINING PROGRAM

## 2025-07-27 PROCEDURE — 6360000002 HC RX W HCPCS: Performed by: STUDENT IN AN ORGANIZED HEALTH CARE EDUCATION/TRAINING PROGRAM

## 2025-07-27 PROCEDURE — 2580000003 HC RX 258: Performed by: NURSE PRACTITIONER

## 2025-07-27 PROCEDURE — 36415 COLL VENOUS BLD VENIPUNCTURE: CPT

## 2025-07-27 RX ORDER — HALOPERIDOL 0.5 MG/1
0.5 TABLET ORAL EVERY 6 HOURS PRN
Status: DISCONTINUED | OUTPATIENT
Start: 2025-07-27 | End: 2025-07-28

## 2025-07-27 RX ORDER — HALOPERIDOL 2 MG/1
1 TABLET ORAL EVERY 6 HOURS PRN
Status: DISCONTINUED | OUTPATIENT
Start: 2025-07-27 | End: 2025-07-27

## 2025-07-27 RX ADMIN — HALOPERIDOL 1 MG: 2 TABLET ORAL at 18:04

## 2025-07-27 RX ADMIN — GABAPENTIN 100 MG: 100 CAPSULE ORAL at 21:27

## 2025-07-27 RX ADMIN — VANCOMYCIN HYDROCHLORIDE 1500 MG: 10 INJECTION, POWDER, LYOPHILIZED, FOR SOLUTION INTRAVENOUS at 18:06

## 2025-07-27 RX ADMIN — THIAMINE HYDROCHLORIDE 500 MG: 100 INJECTION, SOLUTION INTRAMUSCULAR; INTRAVENOUS at 21:26

## 2025-07-27 RX ADMIN — SODIUM CHLORIDE, PRESERVATIVE FREE 10 ML: 5 INJECTION INTRAVENOUS at 08:58

## 2025-07-27 RX ADMIN — DOCUSATE SODIUM 50 MG AND SENNOSIDES 8.6 MG 2 TABLET: 8.6; 5 TABLET, FILM COATED ORAL at 21:27

## 2025-07-27 RX ADMIN — THIAMINE HYDROCHLORIDE 500 MG: 100 INJECTION, SOLUTION INTRAMUSCULAR; INTRAVENOUS at 16:18

## 2025-07-27 RX ADMIN — SODIUM CHLORIDE, PRESERVATIVE FREE 10 ML: 5 INJECTION INTRAVENOUS at 21:30

## 2025-07-27 RX ADMIN — MEROPENEM 2000 MG: 1 INJECTION INTRAVENOUS at 11:09

## 2025-07-27 RX ADMIN — CYANOCOBALAMIN 1000 MCG: 1000 INJECTION, SOLUTION INTRAMUSCULAR; SUBCUTANEOUS at 10:45

## 2025-07-27 RX ADMIN — THIAMINE HYDROCHLORIDE 500 MG: 100 INJECTION, SOLUTION INTRAMUSCULAR; INTRAVENOUS at 08:57

## 2025-07-27 RX ADMIN — Medication 3 MG: at 21:27

## 2025-07-27 ASSESSMENT — PAIN SCALES - GENERAL
PAINLEVEL_OUTOF10: 0
PAINLEVEL_OUTOF10: 0

## 2025-07-27 NOTE — PROGRESS NOTES
Pt assist with charge RN to B[facility]. While cleaning pt he was not following directions when instructed to hold on to walker while he was being cleaned up and 2nd RN was assisting to steady him standing with walker. Pt began to push walker at 2nd RN and chris back fist at her and was verbally aggressive. . Security called to room. Pt now sitting in recliner with Security present in room instructing him he is not to curse and hit staff. Pt continued to be verbally aggressive. After much re-direction pt voiced understanding he is not to be aggressive toward staff. Security informed pt they will return if needed.

## 2025-07-27 NOTE — PROGRESS NOTES
Pt being verbally aggressive, attempting to get out of recliner, pt stating, \"I'm going to get dressed to go meet my family for dinner.\"  Security called to room to assist in calming pt. Pt continues to be aggressive. Security and nursing continue to attempt to calm pt. Will contact on call Hospitalist for assistance with pt.

## 2025-07-27 NOTE — CARE COORDINATION
CM reviewed / screen patient medical chart for planning discharge needs.  CM met with patient to discuss d/c plan and needs.  Patient alert/orient x4.  Patient were able to states his name, , place, self and situation.  Patient verified demographic, no changes.  Verified PCP   (SHERRY MATT JR) was last seen in May and insurance MEDICARE / [facility].  Patient states he lives with his spouse / son in a on elevel home with three steps to enter the home.   Patient states he was independent with his ADL's and iADL's and do has DME (RW/cane,grab bars) available if needed in the home.  Patient states he was independent with driving himself to appointment.  Patient denies any issues with purchasing or affording medications.  CM made patient aware that Team Conference will be on Tuesday /  @ 10 AM.  CM made patient aware that family can be present or via phone to attend Team Conference. CM made patient aware that staff will make recommendation for discharge date, services (HH / Outpatient therapy, etc..) or family training if needed.  Patient has requested CM make contact spouse and provide an update on his progress, services, DME's and discharge update that will be recommended.  CM will continue to monitor and remain available for any needs, concerns or questions that may arise.            25 1041   Service Assessment   Patient Orientation Alert and Oriented;Person;Place;Situation;Self   Cognition Alert   History Provided By Patient;Medical Record   Primary Caregiver Self   Support Systems Spouse/Significant Other;Children   Patient's Healthcare Decision Maker is: Legal Next of Kin   PCP Verified by CM Yes   Last Visit to PCP Within last 3 months   Prior Functional Level Independent in ADLs/IADLs   Current Functional Level Assistance with the following:;Bathing;Dressing;Toileting;Mobility   Can patient return to prior living arrangement Yes   Ability to make needs known: Good   Family able to

## 2025-07-27 NOTE — PROGRESS NOTES
Pt assist to bathroom with sera steady. Pt showed improvement with toileting with staff with use of sera steady to toilet in bathroom.  Pt followed instructions better in bathroom with use of sera steady.

## 2025-07-27 NOTE — CONSULTS
[facility] Hospitalist Consult   Admit Date:  2025 11:33 AM   Name:  Azam May Jr.   Age:  75 y.o.  Sex:  male  :  1950   MRN:  [id number]   Room:  [facility]    Presenting/Chief Complaint: No chief complaint on file.    Reason(s) for Admission: Meningitis after procedure [G97.82, G03.8]     Hospitalists consulted by Jesica Oneill DO for: Agitation    History of Presenting Illness:   Azam May Jr. is a 75 y.o. male with history of GERD, hypertension, IBS, insomnia, hypercholesterolemia normal pressure hydrocephalus, stage III kidney disease currently hospitalized for meningitis postprocedure.  Undergoing physical therapy at inpatient rehab center.  Was consulted for aggressive behavior.  Reportedly, patient was being aggressive with staff and had frequent mood swings today.  Security had been to his room and he was able to calm down.  At the time of exam he was very pleasant, calm and appropriately oriented.  He inquired about discharge plans and was told that he has to continue to do rehab and get better at his activities of daily living prior to discharge.  He understood.  Patient requested [employer] prior to leaving the exam room.  1 was provided.    Assessment & Plan:     Aggressive behavior  - Additionally noted elsewhere the charts were occasional hallucinations  - Patient denied these today however, may benefit from low-dose antipsychotic  - No naps during the day, blinds open, up out of bed as much as possible  - Trial low-dose haloperidol  - Will reassess behavior tomorrow  - Medicine will follow along    Headache  CKD stage III  Irregular heart rate  Disease related debility  Meningitis  - Agree with current ongoing medical care, will defer to primary    Anticipated Discharge Arrangements:   Defer to primary team    PT/OT evals ordered?  Defer to primary team  Diet:  ADULT DIET; Regular  ADULT ORAL NUTRITION SUPPLEMENT; HS Snack; Snack; as needed night time snack  VTE

## 2025-07-27 NOTE — PROGRESS NOTES
VANCO DAILY FOLLOW UP NOTE  [facility]   Pharmacy Pharmacokinetic Monitoring Service - Vancomycin    Consulting Provider: Dr. Oneill   Indication: CNS  Target Concentration: Goal AUC/JEANNE 400-600 mg*hr/L  Day of Therapy: 10  Additional Antimicrobials: meropenem    Pertinent Laboratory Values:   Wt Readings from Last 1 Encounters:   07/14/25 93 kg (205 lb)     Temp Readings from Last 1 Encounters:   07/26/25 97.9 °F (36.6 °C) (Oral)     Recent Labs     07/25/25  0319   BUN 26*   CREATININE 1.67*     CrCl cannot be calculated (Unknown ideal weight.).    Lab Results   Component Value Date/Time    VANCORANDOM 22.0 07/25/2025 03:19 AM       MRSA Nasal Swab: N/A. Non-respiratory infection    Assessment:  Date/Time Dose Concentration AUC   7/20 0652 1250 mg q24h 10.4 488   7/22 0628 1250 mg q24h 11.4 379   7/25 0319 1250 mg q18h 22 621   7/27 0530 1500 mg q24h 24 581   Note: Serum concentrations collected for AUC dosing may appear elevated if collected in close proximity to the dose administered, this is not necessarily an indication of toxicity    Plan:  Will continue the current regimen of 1500 mg IV every 24 hours  Repeat vancomycin concentrations will be ordered as clinically appropriate   Pharmacy will continue to monitor patient and adjust therapy as indicated    Thank you for the consult,  Maximus Schumacher RPH

## 2025-07-28 ENCOUNTER — APPOINTMENT (OUTPATIENT)
Dept: GENERAL RADIOLOGY | Age: 75
DRG: 091 | End: 2025-07-28
Attending: STUDENT IN AN ORGANIZED HEALTH CARE EDUCATION/TRAINING PROGRAM
Payer: MEDICARE

## 2025-07-28 ENCOUNTER — TELEPHONE (OUTPATIENT)
Dept: FAMILY MEDICINE CLINIC | Facility: CLINIC | Age: 75
End: 2025-07-28

## 2025-07-28 PROBLEM — M10.9 ACUTE GOUT INVOLVING TOE OF RIGHT FOOT: Status: ACTIVE | Noted: 2025-07-28

## 2025-07-28 LAB
ANION GAP SERPL CALC-SCNC: 9 MMOL/L (ref 7–16)
BASOPHILS # BLD: 0.06 K/UL (ref 0–0.2)
BASOPHILS NFR BLD: 1 % (ref 0–2)
BUN SERPL-MCNC: 22 MG/DL (ref 8–23)
CALCIUM SERPL-MCNC: 8.6 MG/DL (ref 8.8–10.2)
CHLORIDE SERPL-SCNC: 108 MMOL/L (ref 98–107)
CO2 SERPL-SCNC: 25 MMOL/L (ref 20–29)
CREAT SERPL-MCNC: 1.73 MG/DL (ref 0.8–1.3)
DIFFERENTIAL METHOD BLD: ABNORMAL
EKG ATRIAL RATE: 82 BPM
EKG ATRIAL RATE: 82 BPM
EKG DIAGNOSIS: NORMAL
EKG DIAGNOSIS: NORMAL
EKG P AXIS: 15 DEGREES
EKG P AXIS: 28 DEGREES
EKG P-R INTERVAL: 144 MS
EKG P-R INTERVAL: 162 MS
EKG Q-T INTERVAL: 370 MS
EKG Q-T INTERVAL: 386 MS
EKG QRS DURATION: 80 MS
EKG QRS DURATION: 86 MS
EKG QTC CALCULATION (BAZETT): 432 MS
EKG QTC CALCULATION (BAZETT): 450 MS
EKG R AXIS: -1 DEGREES
EKG R AXIS: -2 DEGREES
EKG T AXIS: -9 DEGREES
EKG T AXIS: 1 DEGREES
EKG VENTRICULAR RATE: 82 BPM
EKG VENTRICULAR RATE: 82 BPM
EOSINOPHIL # BLD: 0.24 K/UL (ref 0–0.8)
EOSINOPHIL NFR BLD: 3.9 % (ref 0.5–7.8)
ERYTHROCYTE [DISTWIDTH] IN BLOOD BY AUTOMATED COUNT: 14.3 % (ref 11.9–14.6)
GLUCOSE SERPL-MCNC: 100 MG/DL (ref 70–99)
HCT VFR BLD AUTO: 39.2 % (ref 41.1–50.3)
HGB BLD-MCNC: 12.6 G/DL (ref 13.6–17.2)
IMM GRANULOCYTES # BLD AUTO: 0.2 K/UL (ref 0–0.5)
IMM GRANULOCYTES NFR BLD AUTO: 3.2 % (ref 0–5)
LYMPHOCYTES # BLD: 0.6 K/UL (ref 0.5–4.6)
LYMPHOCYTES NFR BLD: 9.7 % (ref 13–44)
MAGNESIUM SERPL-MCNC: 2.6 MG/DL (ref 1.8–2.4)
MCH RBC QN AUTO: 31.3 PG (ref 26.1–32.9)
MCHC RBC AUTO-ENTMCNC: 32.1 G/DL (ref 31.4–35)
MCV RBC AUTO: 97.5 FL (ref 82–102)
MONOCYTES # BLD: 0.72 K/UL (ref 0.1–1.3)
MONOCYTES NFR BLD: 11.6 % (ref 4–12)
NEUTS SEG # BLD: 4.37 K/UL (ref 1.7–8.2)
NEUTS SEG NFR BLD: 70.6 % (ref 43–78)
NRBC # BLD: 0 K/UL (ref 0–0.2)
PLATELET # BLD AUTO: 180 K/UL (ref 150–450)
PMV BLD AUTO: 10.1 FL (ref 9.4–12.3)
POTASSIUM SERPL-SCNC: 4 MMOL/L (ref 3.5–5.1)
RBC # BLD AUTO: 4.02 M/UL (ref 4.23–5.6)
SODIUM SERPL-SCNC: 142 MMOL/L (ref 136–145)
WBC # BLD AUTO: 6.2 K/UL (ref 4.3–11.1)

## 2025-07-28 PROCEDURE — 85025 COMPLETE CBC W/AUTO DIFF WBC: CPT

## 2025-07-28 PROCEDURE — 36415 COLL VENOUS BLD VENIPUNCTURE: CPT

## 2025-07-28 PROCEDURE — 2580000003 HC RX 258

## 2025-07-28 PROCEDURE — 97535 SELF CARE MNGMENT TRAINING: CPT

## 2025-07-28 PROCEDURE — 93010 ELECTROCARDIOGRAM REPORT: CPT | Performed by: INTERNAL MEDICINE

## 2025-07-28 PROCEDURE — 80048 BASIC METABOLIC PNL TOTAL CA: CPT

## 2025-07-28 PROCEDURE — 97130 THER IVNTJ EA ADDL 15 MIN: CPT

## 2025-07-28 PROCEDURE — 1180000000 HC REHAB R&B

## 2025-07-28 PROCEDURE — 83735 ASSAY OF MAGNESIUM: CPT

## 2025-07-28 PROCEDURE — 6370000000 HC RX 637 (ALT 250 FOR IP): Performed by: STUDENT IN AN ORGANIZED HEALTH CARE EDUCATION/TRAINING PROGRAM

## 2025-07-28 PROCEDURE — 73600 X-RAY EXAM OF ANKLE: CPT

## 2025-07-28 PROCEDURE — 93005 ELECTROCARDIOGRAM TRACING: CPT | Performed by: STUDENT IN AN ORGANIZED HEALTH CARE EDUCATION/TRAINING PROGRAM

## 2025-07-28 PROCEDURE — 6360000002 HC RX W HCPCS

## 2025-07-28 PROCEDURE — 97530 THERAPEUTIC ACTIVITIES: CPT

## 2025-07-28 PROCEDURE — 6360000002 HC RX W HCPCS: Performed by: NURSE PRACTITIONER

## 2025-07-28 PROCEDURE — 2580000003 HC RX 258: Performed by: NURSE PRACTITIONER

## 2025-07-28 PROCEDURE — 97110 THERAPEUTIC EXERCISES: CPT

## 2025-07-28 PROCEDURE — 76937 US GUIDE VASCULAR ACCESS: CPT

## 2025-07-28 PROCEDURE — 2500000003 HC RX 250 WO HCPCS: Performed by: STUDENT IN AN ORGANIZED HEALTH CARE EDUCATION/TRAINING PROGRAM

## 2025-07-28 PROCEDURE — 99233 SBSQ HOSP IP/OBS HIGH 50: CPT | Performed by: STUDENT IN AN ORGANIZED HEALTH CARE EDUCATION/TRAINING PROGRAM

## 2025-07-28 PROCEDURE — 6360000002 HC RX W HCPCS: Performed by: STUDENT IN AN ORGANIZED HEALTH CARE EDUCATION/TRAINING PROGRAM

## 2025-07-28 PROCEDURE — 97116 GAIT TRAINING THERAPY: CPT

## 2025-07-28 PROCEDURE — 97129 THER IVNTJ 1ST 15 MIN: CPT

## 2025-07-28 RX ORDER — COLCHICINE 0.6 MG/1
1.2 TABLET ORAL ONCE
Status: COMPLETED | OUTPATIENT
Start: 2025-07-28 | End: 2025-07-28

## 2025-07-28 RX ORDER — QUETIAPINE FUMARATE 25 MG/1
25 TABLET, FILM COATED ORAL NIGHTLY
Status: DISCONTINUED | OUTPATIENT
Start: 2025-07-28 | End: 2025-07-29

## 2025-07-28 RX ORDER — LORAZEPAM 1 MG/1
0.5 TABLET ORAL 2 TIMES DAILY PRN
Status: DISPENSED | OUTPATIENT
Start: 2025-07-28

## 2025-07-28 RX ORDER — COLCHICINE 0.6 MG/1
0.6 TABLET ORAL ONCE
Status: COMPLETED | OUTPATIENT
Start: 2025-07-28 | End: 2025-07-28

## 2025-07-28 RX ADMIN — SODIUM CHLORIDE, PRESERVATIVE FREE 10 ML: 5 INJECTION INTRAVENOUS at 22:35

## 2025-07-28 RX ADMIN — GABAPENTIN 100 MG: 100 CAPSULE ORAL at 20:47

## 2025-07-28 RX ADMIN — VANCOMYCIN HYDROCHLORIDE 1250 MG: 10 INJECTION, POWDER, LYOPHILIZED, FOR SOLUTION INTRAVENOUS at 20:57

## 2025-07-28 RX ADMIN — COLCHICINE 0.6 MG: 0.6 TABLET, FILM COATED ORAL at 17:41

## 2025-07-28 RX ADMIN — QUETIAPINE FUMARATE 25 MG: 25 TABLET ORAL at 20:47

## 2025-07-28 RX ADMIN — THIAMINE HYDROCHLORIDE 500 MG: 100 INJECTION, SOLUTION INTRAMUSCULAR; INTRAVENOUS at 09:25

## 2025-07-28 RX ADMIN — CYANOCOBALAMIN 1000 MCG: 1000 INJECTION, SOLUTION INTRAMUSCULAR; SUBCUTANEOUS at 09:25

## 2025-07-28 RX ADMIN — MEROPENEM 2000 MG: 1 INJECTION INTRAVENOUS at 00:06

## 2025-07-28 RX ADMIN — DOCUSATE SODIUM 50 MG AND SENNOSIDES 8.6 MG 2 TABLET: 8.6; 5 TABLET, FILM COATED ORAL at 20:47

## 2025-07-28 RX ADMIN — MEROPENEM 2000 MG: 1 INJECTION INTRAVENOUS at 12:23

## 2025-07-28 RX ADMIN — MEROPENEM 2000 MG: 1 INJECTION INTRAVENOUS at 23:33

## 2025-07-28 RX ADMIN — LORAZEPAM 0.5 MG: 1 TABLET ORAL at 14:44

## 2025-07-28 RX ADMIN — THIAMINE HYDROCHLORIDE 500 MG: 100 INJECTION, SOLUTION INTRAMUSCULAR; INTRAVENOUS at 20:46

## 2025-07-28 RX ADMIN — Medication 3 MG: at 22:31

## 2025-07-28 RX ADMIN — SODIUM CHLORIDE, PRESERVATIVE FREE 10 ML: 5 INJECTION INTRAVENOUS at 09:25

## 2025-07-28 RX ADMIN — THIAMINE HYDROCHLORIDE 500 MG: 100 INJECTION, SOLUTION INTRAMUSCULAR; INTRAVENOUS at 14:44

## 2025-07-28 RX ADMIN — COLCHICINE 1.2 MG: 0.6 TABLET, FILM COATED ORAL at 12:32

## 2025-07-28 ASSESSMENT — PAIN SCALES - GENERAL: PAINLEVEL_OUTOF10: 0

## 2025-07-28 NOTE — PLAN OF CARE
[facility]  Rehab Physician Plan of Care Review     Patient Name:  Azam May IGC: Meningitis after procedure [G97.82, G03.8]      Primary Rehabilitation (etiologic) diagnosis: Meningitis, encephalopathy    Principal Problem:    Meningitis after procedure  Active Problems:    Cognitive deficits    Impulsive    Dysesthesia affecting both sides of body    Acute gout involving toe of right foot  Resolved Problems:    * No resolved hospital problems. *       Medical/Functional Prognosis: Good     Anticipated functional outcomes/goals and interventions: Performs mobility and self care activities at the highest level of function for planned discharge setting. Advance to the least restrictive diet without signs or symptoms of aspiration and demonstrate communication skills at the highest level of function for planned discharge setting.     Patient's/family's anticipated outcomes/personal goals: to improve strength, endurance, and independence      Physical therapy functional outcome/goal:  Bed mobility  Bridging: Contact guard assistance  Rolling to Left: Minimal assistance  Rolling to Right: Minimal assistance  Supine to Sit: Unable to assess  Sit to Supine: Unable to assess  Scooting: Contact guard assistance  Bed Mobility Comments: does not follow commands or initiate after extra time   Transfers  Sit to Stand: 2 Person Assistance, Substantial/Maximal assistance  Stand to Sit: Substantial/Maximal assistance, 2 Person Assistance  Bed to Chair: Partial/Moderate assistance  Stand Pivot Transfers: Substantial/Maximal assistance, 2 Person Assistance   Ambulation  Surface: Level tile  Device: Rolling Walker  Assistance: Partial/Moderate assistance  Gait Deviations: Decreased step height, Decreased arm swing, Slow Marissa, Decreased head and trunk rotation, Decreased step length, Deviated path, Shuffles, Staggers  Distance: 12  More Ambulation?: Yes   Ambulation 2  Surface - 2: level

## 2025-07-28 NOTE — PROGRESS NOTES
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  Discussed possibility of acute gouty flareup right ankle and right big toe, agreeable to initiating medication.  Discussed behavior over the weekend, patient endorses no memory of the events.  Discussed with charge nurse. All questions and concerns were addressed at this time.    Objective:     Current Facility-Administered Medications   Medication Dose Route Frequency    QUEtiapine (SEROQUEL) tablet 25 mg  25 mg Oral Nightly    LORazepam (ATIVAN) tablet 0.5 mg  0.5 mg Oral BID PRN    colchicine (COLCRYS) tablet 0.6 mg  0.6 mg Oral Once    vancomycin (VANCOCIN) 1250 mg in sodium chloride 0.9% 250 mL IVPB  1,250 mg IntraVENous Q24H    cyanocobalamin injection 1,000 mcg  1,000 mcg IntraMUSCular Daily    Followed by    [START ON 7/30/2025] cyanocobalamin injection 1,000 mcg  1,000 mcg IntraMUSCular Weekly    Followed by    [START ON 8/27/2025] cyanocobalamin injection 1,000 mcg  1,000 mcg IntraMUSCular Q30 Days    sodium chloride flush 0.9 % injection 5-40 mL  5-40 mL IntraVENous 2 times per day    sodium chloride flush 0.9 % injection 5-40 mL  5-40 mL IntraVENous PRN    thiamine (B-1) injection 500 mg  500 mg IntraVENous TID    acetaminophen (TYLENOL) tablet 650 mg  650 mg Oral Q4H PRN    polyethylene glycol (GLYCOLAX) packet 17 g  17 g Oral Daily PRN    sodium phosphate (FLEET) rectal enema 1 enema  1 enema Rectal Daily PRN    bisacodyl (DULCOLAX) suppository 10 mg  10 mg Rectal Daily PRN    calcium carbonate (TUMS) chewable tablet 500 mg  500 mg Oral TID PRN    hydrALAZINE (APRESOLINE) tablet 10 mg  10 mg Oral TID PRN    ondansetron (ZOFRAN-ODT) disintegrating tablet 4 mg  4 mg Oral  7/28/2025 10:06:06 AM     EKG 12 Lead    Collection Time: 07/28/25 11:45 AM   Result Value Ref Range    Ventricular Rate 82 BPM    Atrial Rate 82 BPM    P-R Interval 162 ms    QRS Duration 86 ms    Q-T Interval 370 ms    QTc Calculation (Bazett) 432 ms    P Axis 28 degrees    R Axis -2 degrees    T Axis 1 degrees    Diagnosis       Sinus rhythm with frequent Premature ventricular complexes  Nonspecific ST abnormality  When compared with ECG of 28-JUL-2025 08:58,  No significant change was found  Confirmed by ANDI FELDMAN (), MARKO BROWN ([id number]) on 7/28/2025 12:05:33 PM         B/B  Last BM (including prior to admit): 07/28/25    Urine Assessment  Urinary Status: Incontinent briefs  Urinary Incontinence: Present  Urine Color: Yellow/straw         Functional Assessment:  Per PT:     Bed mobility  Bridging: Contact guard assistance  Rolling to Left: Minimal assistance  Rolling to Right: Minimal assistance  Supine to Sit: Unable to assess  Sit to Supine: Unable to assess  Scooting: Contact guard assistance  Bed Mobility Comments: does not follow commands or initiate after extra time   Transfers  Sit to Stand: 2 Person Assistance, Substantial/Maximal assistance  Stand to Sit: Substantial/Maximal assistance, 2 Person Assistance  Bed to Chair: Partial/Moderate assistance  Stand Pivot Transfers: Substantial/Maximal assistance, 2 Person Assistance   Ambulation  Surface: Level tile  Device: Rolling Walker  Assistance: Partial/Moderate assistance  Gait Deviations: Decreased step height, Decreased arm swing, Slow Marissa, Decreased head and trunk rotation, Decreased step length, Deviated path, Shuffles, Staggers  Distance: 12  More Ambulation?: Yes   Ambulation 2  Surface - 2: level tile  Device 2: Rolling Walker  Assistance 2: Partial/Moderate assistance  Gait Deviations: Decreased arm swing, Slow Marissa, Staggers, Decreased step height, Decreased step length, Deviated path, Decreased head and trunk rotation  Distance: 10     extremity edema  -Doppler ultrasound ruled out DVT 7/24     //CKD 3  -Creatinine 1.737/28  -Monitor BMP during rehab stay  - Continue to encourage fluid hydration     //Irregular heartbeat  - EKG obtained 7/25, independently reviewed by myself  - QTc within normal limits  - PVCs noted, normal sinus rhythm  - Depending on lability of heart rate, may benefit from beta-blocker  - Continue monitoring during rehab stay     //Overweight  -Complicating all aspects of medical care and contributing to functional decline  Body mass index is 28.98 kg/m².                     CODE STATUS: Full Code      DVT prophylaxis: Mobilization with PT and OT.  SCDs.     Labs: CBC, BMP to be ordered on admission to rehab unit and will repeat as needed.                 Will require continued close monitoring of the following systems:  -CV: Monitor blood pressure and heart rate.  Adjust medications as needed.    -Endocrine: Monitor glycemia and adjust pharmacologic regimen as needed.  -Respiratory: Respiratory therapy consult.  Incentive spirometer every 2 hours while awake, as needed.  To be followed by respiratory therapist as needed.  -Sleep: Monitor sleep cycles.   -Skin: Monitor incisions/wounds for adequate healing.  Wound care coordinator consulted as needed.  Frequent turning while in bed and repositioning in chair.  Monitor for skin breakdown or erythema.  -Psychiatric: Monitor for signs and symptoms of depression.  Monitor appetite.  Monitor for depression anxiety as the patient is adjusting to disability.  Monitor and adjust medications as needed.  -Nutrition: Continue current diet and adjust as needed and as tolerated.  Consult dietitian for nutritional assessment and recommendations.  -ID: Patient will be monitored closely for any signs or symptoms of infection, such as elevated white blood cell count, increased temperature, signs of UTI.    -Pain: The patient will be monitored closely for signs and symptoms of pain.  Pain

## 2025-07-28 NOTE — PROGRESS NOTES
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 created external memory aid to help promote orientation and recall and focus on errorless learning. he will benefit from errorless learning given poor recall, insight, reasoning impacting ability to carryover. given poor insight he can becomes agitated with need for assistance but he is easily redirectable.      GENERAL   Subjective:  \"they said I had meningitis but I don't believe that\" \"I need a professional to tell me and there's no professionals up here\"      Pain:   Patient does not c/o pain                       OBJECTIVE   Patient seen for cognitive linguistic tx:  Able to recall his address after min cue for city he currently lives in.   Orientation: disoriented to location. Re oriented but unable to recall after 1 min delay.  Created simple written memory aid. Using memory aid, pt able to state current location. initially he was denying need for therapy, asking what each therapy was, what they do, etc.  Added information to written aid. he described each therapy using memory aid and was more receptive to his daily schedule.   Patient unable to answer questions related to current time, schedule. Poor insight with no awareness of errors or difficulty.       PLAN    Duration/Frequency: Continue to follow patient 4-5

## 2025-07-28 NOTE — PROGRESS NOTES
Assisted to BR via steady lift, RN and CNA, BM and UOP recorded. Tolerated well. Cushion added to recliner chair as pt c/o soreness. Chair alarm on.

## 2025-07-28 NOTE — PROGRESS NOTES
[facility] OCCUPATIONAL THERAPY DAILY NOTE   OT Co-Treatment Minutes  Time In: 1033  Time Out: 1121  Minutes: 48             Subjective: Pt agreeable to treatment. \"Who are you?\" in regards to therapist encounter, pt unable to recall this therapist who assisted with ADL's earlier in AM.   Pain: C/O R toe pain. Notified MD Dr. Oneill of.   Interdisciplinary Communication: Collaborated with PT and RN regarding pt status, pt performance, and handoff communication. MD  Precautions: Falls, Poor Safety Awareness, Posey Alarm, Cognitive Impairment, and virtual sitter     FUNCTIONAL MOBILITY:       Bed Mobility NT    Sit to Stand MaxA    Transfer  MaxAx1-2  Transfer Type: SPT and LPT  Equipment: RW and Without AD  MAX Ax1-2 intermittently, pt varies on assist levels d/t functional cognitive deficits. Pt with poor motor planning/sequencing steps for safe t/f technique   Ambulation NT  Equipment: NA       - Activity Tolerance - Balance - Cognition - Coordination - Range of Motion - Strengthening - Visual-Perceptual    Pt engaged in game of Kidlandia while in standing to address standing balance/tolerance, ROM, coordination, and activity tolerance for integration into I/ADLs. One therapist assisted with maintaining pt's balance with MIN-MOD A with posterior lean noted with FWW placed in front of pt d/t level of difficulty. Pt practiced retrieving bean bags from second therapist facilitation of crossing midline and dynamic reach in different planes of motion to further facilitate dynamic balance and leaning forward d/t posterior lean. Pt then threw them at board placed 6 feet away. Pt demonstrated fair (-) balance and coordination throughout. Pt completed 1 set of 24 reps with weighted bean bags with ~85% accuracy. Pt seated in w/c for further coordination and hand eye with BUE strengthening caught bean bags tossed by therapist with good coordination seated. Pt required few rest breaks.     Pt completed ball toss activity standing  with one therapist with MIN-MOD A for balance while other therapist tossed a balloon at ~4.5-5 feet away graded to pt's level while pt used BUE's to hit ball back. Pt used R <> L hand(s) to hit balloon tossed to pt. Pt stood for ~5-6 minutes during task with required rest break following.    Task then graded up with use of beach ball. Pt then used B hand(s) to catch and toss ball BUE hands for bimanual coordination to therapist ~4.5-5ft away. Pt completed ball  <> toss for ~5 minutes back n forth. Pt had no overt LOB with CGA assisted recovery. Pt required few verbal cues for task and pacing.        Education   Benefits of OT, Rolling Walker Management, and Safety Awareness     Assessment: Pt seen for co-treat with PT/OT this date d/t level of assist/functional cognitive deficits that pt is unpredictable with behavior and mobility requiring second skilled therapist for progression of therapy towards goals and to ensure pt safety. Patient presents with poor functional cognition with decreased memory/attention/insight/problem solving/processing, . Pt demonstrated good participation in OT treatment. Pt continues to benefit from skilled OT services to address remaining deficits and progress toward baseline level of independence and safety. Patient ended session seated in recliner with call bell and needs within reach, with chair/bed alarm activated, with legs elevated, with Virtual/Physical Sitter, and with MD/PA.   Plan: Continue OT POC.     DONNA GREGG OT   7/28/2025

## 2025-07-28 NOTE — PROGRESS NOTES
VANCO DAILY FOLLOW UP NOTE  [facility]   Pharmacy Pharmacokinetic Monitoring Service - Vancomycin    Consulting Provider: Dr. Oneill   Indication: CNS  Target Concentration: Goal AUC/JEANNE 400-600 mg*hr/L  Day of Therapy: 11  Additional Antimicrobials: meropenem    Pertinent Laboratory Values:   Wt Readings from Last 1 Encounters:   07/14/25 93 kg (205 lb)     Temp Readings from Last 1 Encounters:   07/28/25 97.9 °F (36.6 °C) (Oral)     Recent Labs     07/28/25  0523   BUN 22   CREATININE 1.73*   WBC 6.2     CrCl cannot be calculated (Unknown ideal weight.).    Lab Results   Component Value Date/Time    VANCORANDOM 24.0 07/27/2025 05:30 AM       MRSA Nasal Swab: N/A. Non-respiratory infection    Assessment:  Date/Time Dose Concentration AUC   7/20 0652 1250 mg q24h 10.4 488   7/22 0628 1250 mg q24h 11.4 379   7/25 0319 1250 mg q18h 22 621   7/27 0530 1500 mg q24h 24 581   Note: Serum concentrations collected for AUC dosing may appear elevated if collected in close proximity to the dose administered, this is not necessarily an indication of toxicity    Plan:  Dosing recommendations based on Bayesian software   Current regimen at upper end of goal range, so dose adjusted to 1250 mg IV every 24 hours for predicted AUC/Tr of 502/14.6  Repeat vancomycin concentrations will be ordered as clinically appropriate   Pharmacy will continue to monitor patient and adjust therapy as indicated    Thank you for the consult,  Jaime Fox [facility]

## 2025-07-28 NOTE — PROGRESS NOTES
End of Shift Note      Acute Onset Mental Status change? No    Does the patient have Inattention? Fluctuates    Does the patient have Disorganized Thinking? Fluctuates    Does the patient have Altered Level of Consciousness? Fluctuates    Is the patient impulsive? Yes    Expression of Ideas and Wants  Does the patient express ideas and wants without difficulty? Yes    Does the patient understand verbal and non-verbal content without cues or repetition? Yes    Self-Care    Toileting Hygiene:  Did the patient void or have a bowel movement? Yes; [place] does ALL the work.    The patient uses a brief;     Mobility  Chair/bed-to-chair Transfer:  Did the patient transfer to and from a bed to a chair or wheelchair? Yes; Requires two helpers.    Toilet Transfer:    Did the patient use a standard toilet with or without a raised seat or bedside commode? No; The activity did not occur because safety.    Bladder   Does the patient urinate? Yes  Does the patient have a catheter? No; Is the patient incontinent? Yes;  Date of last incontinent episode: 7/28  Does the patient have stress incontinence? No    Bowel  Date of last BM 7/27   Does the patient have an ostomy? no    Pain  Does the patient have pain that affects any of the following: Sleep, Therapy Activities, or Day-to-Day Activities? None of the activities are affected.

## 2025-07-28 NOTE — PROGRESS NOTES
[facility] OCCUPATIONAL THERAPY DAILY NOTE  OT Individual Minutes  Time In: 0750  Time Out: 0839  Minutes: 49               Subjective: Pt agreeable to treatment. \"They had a big  in my room this weekend.\"  Pain: RN notified .  Interdisciplinary Communication: Collaborated with PT, RN, and SLP regarding pt status, pt performance, and handoff communication.   Precautions: Falls, Poor Safety Awareness, Posey Alarm, Cognitive Impairment, and virtual sitter       FUNCTIONAL MOBILITY:       Bed Mobility Praveen HOB elevated/bed rail    Sit to Stand CGA and Praveen    Transfer  Dependent   Transfer Type: Octavia Stedy  Equipment: Octavia Stedy    Ambulation NT  Equipment: Octavia Stedy and NA      ACTIVITIES OF DAILY LIVING:       Oral Hygiene Setup or clean-up assistance S/UA while seated in recliner chair, decreased balance for standing sinkside, pt able to sequence steps of brushing teeth with no cues   Upper Body  Dressing Supervision or touching assistance SBA seated with supports lateral/posterior for donning over head shirt few cues sequencing d/t attention, able to doff front facing gown seated   Lower Body Dressing Partial/moderate assistance MOD A to thread BLE's-pt able to participate in steps, standing via octavia steady A for thoroughness pulling up over hips CGA via octavia steady/pt able to assist   Toileting Hygiene Partial/moderate assistance MOD A overall-pt via octavia steady standing able to manage pants up over hips increased time, A to doff brief and A for posterior cares seated on BSC placed over toilet   Toilet Transfer Dependent Via octavia steady, CGA-MIN A for STS on octavia steady   Education Adaptive ADL Techniques, Benefits of OT, Energy Conservation, Pacing, Functional Transfer Training, Rolling Walker Management, and Safety Awareness     Assessment: Patient with improved simple/direct command following this date, noted a few inappropriate comments/pt easily redirectable and required throughout, pt required few

## 2025-07-28 NOTE — PROGRESS NOTES
PHYSICAL THERAPY DAILY NOTE    PT Individual Minutes  Time In: 1300  Time Out: 1351  Minutes: 51 PT Co-Treatment Minutes  Time In: 1033  Time Out: 1121  Minutes: 48               Total Treatment Time:  51 Minutes    Pt. Seen for: PM, Gait Training, Therapeutic Exercise, and Transfer Training     Subjective: Patient had no complaints.         Objective: Patient had bowel movement upon entering room and unaware. CNA assisted with hygiene and clothes management. He required max assist for standing with walker while CNA cleaned patient.  Restrictions/Precautions: Fall Risk, General Precautions, Bed Alarm                        GROSS ASSESSMENT           COGNITION Daily Assessment                 BED/MAT MOBILITY Daily Assessment     Supine to Sit: Unable to assess  Sit to Supine: Unable to assess        TRANSFERS Daily Assessment    Sit to Stand: Substantial/Maximal assistance  Stand to Sit: Substantial/Maximal assistance  Stand Pivot Transfers: Substantial/Maximal assistance;2 Person Assistance          GAIT Daily Assessment    Surface: Level tile  Device: Rolling Walker  Assistance: Substantial/Maximal assistance  Distance: 10 x 3  More Ambulation?: No              STEPS/STAIRS Daily Assessment    Stairs?: No              BALANCE Daily Assessment    Static Sitting: Fair:  Pt. requires UE support;  may need occasional min A  Dynamic Sitting: Poor - unable to accept challenge or move without LOB   Static Standing: Total:  Pt. unable to maintain without total support  Dynamic Standing: Poor - unable to accept challenge or move without LOB        WHEELCHAIR MOBILITY Daily Assessment                          LOWER EXTREMITY EXERCISES        Pain level: pain reported but not rated  Pain Location:  right big toe  Pain Interventions:     Vital Signs:  BP (!) 130/90   Pulse 83   Temp 98.4 °F (36.9 °C) (Oral)   Resp 17   Wt 91.6 kg (202 lb)   SpO2 98%   BMI 28.98 kg/m²       Education:    Education Given To:

## 2025-07-28 NOTE — PROGRESS NOTES
Hospitalist Progress Note   Admit Date:  2025 11:33 AM   Name:  Azam May Jr.   Age:  75 y.o.  Sex:  male  :  1950   MRN:  [id number]   Room:  [facility]    Presenting/Chief Complaint: No chief complaint on file.     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 is a 75 y.o. male with history of GERD, hypertension, IBS, insomnia, hypercholesterolemia normal pressure hydrocephalus, stage III kidney disease currently hospitalized for meningitis postprocedure.  Undergoing physical therapy at inpatient rehab center.  Was consulted for aggressive behavior.  Reportedly, patient was being aggressive with staff and had frequent mood swings today.  Security had been to his room and he was able to calm down.     Spoke with nursing staff on morning of  patient still with inappropriate verbal outburst with nursing/ancillary staff    Subjective & 24hr Events:   Patient answering questions appropriately with serial sevens intact & recall as well.  States that he does not want to be assisted with changing clothes or basic ADLs      Assessment & Plan:     Aggressive behavior  Based on discussions with other care members, patient's sundowning appears consistent with prior episodes.  Currently on IV antibiotics for postprocedure meningitis and seems to be responding appropriately to treatment given lack of fevers or leukocytosis other localizing signs or symptoms of infection  - Can use antipsychotics for any concerns of patient harm or staff harm  - Otherwise use conservative delirium protocols including opening blinds during the day, limiting naptime, frequent redirection  - Continue meropenem and vancomycin  - Continue high-dose thiamine        Non-peripheral Lines and Tubes (if present):          Telemetry (if present):           Hospital Problems:  Principal Problem:    Meningitis after procedure  Active Problems:    Cognitive  Sinus rhythm with frequent Premature ventricular complexes  Nonspecific ST abnormality  When compared with ECG of 28-JUL-2025 08:58,  No significant change was found  Confirmed by ANDI FELDMAN (), MARKO BROWN ([id number]) on 7/28/2025 12:05:33 PM         No results for input(s): \"COVID19\" in the last 72 hours.    Current Meds:  Current Facility-Administered Medications   Medication Dose Route Frequency    QUEtiapine (SEROQUEL) tablet 25 mg  25 mg Oral Nightly    LORazepam (ATIVAN) tablet 0.5 mg  0.5 mg Oral BID PRN    colchicine (COLCRYS) tablet 0.6 mg  0.6 mg Oral Once    cyanocobalamin injection 1,000 mcg  1,000 mcg IntraMUSCular Daily    Followed by    [START ON 7/30/2025] cyanocobalamin injection 1,000 mcg  1,000 mcg IntraMUSCular Weekly    Followed by    [START ON 8/27/2025] cyanocobalamin injection 1,000 mcg  1,000 mcg IntraMUSCular Q30 Days    sodium chloride flush 0.9 % injection 5-40 mL  5-40 mL IntraVENous 2 times per day    sodium chloride flush 0.9 % injection 5-40 mL  5-40 mL IntraVENous PRN    thiamine (B-1) injection 500 mg  500 mg IntraVENous TID    acetaminophen (TYLENOL) tablet 650 mg  650 mg Oral Q4H PRN    polyethylene glycol (GLYCOLAX) packet 17 g  17 g Oral Daily PRN    sodium phosphate (FLEET) rectal enema 1 enema  1 enema Rectal Daily PRN    bisacodyl (DULCOLAX) suppository 10 mg  10 mg Rectal Daily PRN    calcium carbonate (TUMS) chewable tablet 500 mg  500 mg Oral TID PRN    hydrALAZINE (APRESOLINE) tablet 10 mg  10 mg Oral TID PRN    ondansetron (ZOFRAN-ODT) disintegrating tablet 4 mg  4 mg Oral Q8H PRN    carboxymethylcellulose (THERATEARS) 1 % ophthalmic gel 1 drop  1 drop Both Eyes TID PRN    Benzocaine-Menthol (CEPACOL SORE THROAT) 15-2.6 MG lozenge 1 lozenge  1 lozenge Oral Q2H PRN    medicated lip ointment (BLISTEX)   Topical PRN    mineral oil-hydrophilic petrolatum (AQUAPHOR) ointment   Topical BID PRN    sodium chloride (OCEAN, BABY AYR) 0.65 % nasal spray 1 spray  1 spray Each

## 2025-07-28 NOTE — CONSULTS
Ultrasound was used to find the vein which was compressible and without any ultrasound features of an artery or nerve bundle. Skin was cleaned and disinfected prior to IV puncture.  Under real-time ultrasound guidance peripheral access was obtained in the left cephalic using 22 G 1.75\" Peripheral IV catheter after 1 attempt(s). No immediate complications noted. Patient did not tolerate the procedure well.  Refer to IV flowsheet for further documentation.

## 2025-07-28 NOTE — PLAN OF CARE
Problem: Safety - Adult  Goal: Free from fall injury  7/28/2025 0729 by Anu Sosa, RN  Outcome: Progressing  7/27/2025 2002 by Britni Blanco, RN  Outcome: Progressing     Problem: Skin/Tissue Integrity  Goal: Skin integrity remains intact  Description: 1.  Monitor for areas of redness and/or skin breakdown  2.  Assess vascular access sites hourly  3.  Every 4-6 hours minimum:  Change oxygen saturation probe site  4.  Every 4-6 hours:  If on nasal continuous positive airway pressure, respiratory therapy assess nares and determine need for appliance change or resting period  Outcome: Progressing

## 2025-07-28 NOTE — PROGRESS NOTES
End of Shift Note      Acute Onset Mental Status change? No    Does the patient have Inattention? Behavior not present    Does the patient have Disorganized Thinking? Fluctuates    Does the patient have Altered Level of Consciousness? Behavior not present    Is the patient impulsive? Yes    Expression of Ideas and Wants  Does the patient express ideas and wants without difficulty? No, Frequently exhibits difficulty with expressing needs and ideas    Does the patient understand verbal and non-verbal content without cues or repetition? No, Understands only basic conversations or simple, direct phrases or if frequently requires cues to understand    Self-Care  Eating:  Did the patient eat by mouth? Yes; The patient requires minimum help: Set-up or clean-up;    Oral Hygiene:  Did the patient use suitable items to clean teeth or gums? Worked with therapy.    Toileting Hygiene:  Did the patient void or have a bowel movement? Requires assistance with sit-to-stand device, getting into BR, cleaning and back    The patient uses a toilet;     In: -   Out: 2  In: -   Out: 2 [Urine:1]    Shower/Bathe Self:  Did the patient have a shower or bath? Worked with therapy    Upper Body Dressing:  Did the patient dress or undress above the waist? Worked with therapy    Lower Body Dressing:  Did the patient dress or undress below the waist? Worked with therapy    Mobility  Chair/bed-to-chair Transfer:  Did the patient transfer to and from a bed to a chair or wheelchair? Yes; Requires two helpers.    Toilet Transfer:    Did the patient use a standard toilet with or without a raised seat or bedside commode? Yes; Requires two helpers.    Bladder   Does the patient urinate? Yes  Does the patient have a catheter? No; Is the patient incontinent? No  Does the patient have stress incontinence? No    Bowel  Date of last BM 7/28   Does the patient have an ostomy? No; Is the patient incontinent? Yes;  Date of last incontinent episode: 7/28

## 2025-07-29 PROCEDURE — 97130 THER IVNTJ EA ADDL 15 MIN: CPT

## 2025-07-29 PROCEDURE — 6360000002 HC RX W HCPCS: Performed by: STUDENT IN AN ORGANIZED HEALTH CARE EDUCATION/TRAINING PROGRAM

## 2025-07-29 PROCEDURE — 97110 THERAPEUTIC EXERCISES: CPT

## 2025-07-29 PROCEDURE — 6370000000 HC RX 637 (ALT 250 FOR IP): Performed by: STUDENT IN AN ORGANIZED HEALTH CARE EDUCATION/TRAINING PROGRAM

## 2025-07-29 PROCEDURE — 2580000003 HC RX 258

## 2025-07-29 PROCEDURE — 97530 THERAPEUTIC ACTIVITIES: CPT

## 2025-07-29 PROCEDURE — 97535 SELF CARE MNGMENT TRAINING: CPT

## 2025-07-29 PROCEDURE — 2580000003 HC RX 258: Performed by: NURSE PRACTITIONER

## 2025-07-29 PROCEDURE — 1180000000 HC REHAB R&B

## 2025-07-29 PROCEDURE — 2500000003 HC RX 250 WO HCPCS: Performed by: STUDENT IN AN ORGANIZED HEALTH CARE EDUCATION/TRAINING PROGRAM

## 2025-07-29 PROCEDURE — 6360000002 HC RX W HCPCS: Performed by: NURSE PRACTITIONER

## 2025-07-29 PROCEDURE — 97129 THER IVNTJ 1ST 15 MIN: CPT

## 2025-07-29 PROCEDURE — 6360000002 HC RX W HCPCS

## 2025-07-29 PROCEDURE — 97116 GAIT TRAINING THERAPY: CPT

## 2025-07-29 RX ORDER — DONEPEZIL HYDROCHLORIDE 5 MG/1
5 TABLET, FILM COATED ORAL NIGHTLY
Status: DISPENSED | OUTPATIENT
Start: 2025-07-29

## 2025-07-29 RX ORDER — COLCHICINE 0.6 MG/1
0.6 TABLET ORAL 2 TIMES DAILY
Status: DISCONTINUED | OUTPATIENT
Start: 2025-07-29 | End: 2025-07-30

## 2025-07-29 RX ORDER — QUETIAPINE FUMARATE 25 MG/1
25 TABLET, FILM COATED ORAL 2 TIMES DAILY
Status: DISCONTINUED | OUTPATIENT
Start: 2025-07-29 | End: 2025-07-30

## 2025-07-29 RX ORDER — PREDNISONE 20 MG/1
40 TABLET ORAL DAILY
Status: DISCONTINUED | OUTPATIENT
Start: 2025-07-29 | End: 2025-07-29

## 2025-07-29 RX ADMIN — QUETIAPINE FUMARATE 25 MG: 25 TABLET ORAL at 21:19

## 2025-07-29 RX ADMIN — THIAMINE HYDROCHLORIDE 500 MG: 100 INJECTION, SOLUTION INTRAMUSCULAR; INTRAVENOUS at 23:18

## 2025-07-29 RX ADMIN — CYANOCOBALAMIN 1000 MCG: 1000 INJECTION, SOLUTION INTRAMUSCULAR; SUBCUTANEOUS at 07:45

## 2025-07-29 RX ADMIN — THIAMINE HYDROCHLORIDE 500 MG: 100 INJECTION, SOLUTION INTRAMUSCULAR; INTRAVENOUS at 07:45

## 2025-07-29 RX ADMIN — THIAMINE HYDROCHLORIDE 500 MG: 100 INJECTION, SOLUTION INTRAMUSCULAR; INTRAVENOUS at 16:57

## 2025-07-29 RX ADMIN — DONEPEZIL HYDROCHLORIDE 5 MG: 5 TABLET, FILM COATED ORAL at 21:19

## 2025-07-29 RX ADMIN — GABAPENTIN 100 MG: 100 CAPSULE ORAL at 21:18

## 2025-07-29 RX ADMIN — MEROPENEM 2000 MG: 1 INJECTION INTRAVENOUS at 23:31

## 2025-07-29 RX ADMIN — COLCHICINE 0.6 MG: 0.6 TABLET, FILM COATED ORAL at 21:18

## 2025-07-29 RX ADMIN — SODIUM CHLORIDE, PRESERVATIVE FREE 10 ML: 5 INJECTION INTRAVENOUS at 23:19

## 2025-07-29 RX ADMIN — SODIUM CHLORIDE, PRESERVATIVE FREE 10 ML: 5 INJECTION INTRAVENOUS at 07:45

## 2025-07-29 RX ADMIN — SODIUM CHLORIDE, PRESERVATIVE FREE 10 ML: 5 INJECTION INTRAVENOUS at 21:21

## 2025-07-29 RX ADMIN — COLCHICINE 0.6 MG: 0.6 TABLET, FILM COATED ORAL at 13:47

## 2025-07-29 RX ADMIN — MEROPENEM 2000 MG: 1 INJECTION INTRAVENOUS at 12:16

## 2025-07-29 RX ADMIN — Medication 3 MG: at 21:18

## 2025-07-29 RX ADMIN — VANCOMYCIN HYDROCHLORIDE 1250 MG: 10 INJECTION, POWDER, LYOPHILIZED, FOR SOLUTION INTRAVENOUS at 18:30

## 2025-07-29 ASSESSMENT — PAIN SCALES - WONG BAKER
WONGBAKER_NUMERICALRESPONSE: NO HURT
WONGBAKER_NUMERICALRESPONSE: NO HURT

## 2025-07-29 ASSESSMENT — PAIN SCALES - GENERAL
PAINLEVEL_OUTOF10: 0

## 2025-07-29 NOTE — PROGRESS NOTES
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 can be redirected to de escalate. caregiver strategies for communication and re orientation to help reduce further agitating patient.      GENERAL   Subjective:  wife at bedside. \"We're gonna pack the car and everyone go home Friday\"      Pain:   Patient does not c/o pain                       OBJECTIVE   Cognitive linguistic tx:  Requires other to initiate use of external aid given poor awareness of memory deficit.  Able to read information from memory aid but not functional given poor insight, recall (<30 seconds). Tried space retrieval, but pt becomes agitated so discontinued.  +confabulation.     Shifted focus to communication partner strategies to help promote re-orientation and decrease agitation.       PLAN    Duration/Frequency: Continue to follow patient 4-5 x/week for duration of inpatient rehab stay to address goals listed or until goals met.     Medical Necessity/Other Comments:   Patient is expected to demonstrate progress in cognitive ability to decrease assistance required communication, increase independence with activities of daily living, and increase communication with family/caregivers.    GOALS:   LTG: Patient will develop functional attention skills to effectively attend to and communicate in functional living  environment to REJI level 4  LTG: Patient will improve neuro-linguistic abilities to increase safety and independence in functional living environment   STG: Caregiver will verbalize understanding of speech therapy recommendations, applicable compensatory strategies, and suggested techniques to facilitate functional performance, safety, independence   STG: Patient will verbally sequence steps to complete daily task with min cueing   STG: Patient will state orientation information/personal information given prompt for external memory aid use as needed   STG: Patient will use external memory aids to recall daily activities completed with moderate verbal cues  STG: Patient will initiate a response to questions/directions with no more than 2 prompts on >50% of occasions   STG: Patient will immediately repeat back instructions/relevant list/information given moderate cue    PATIENT EDUCATION: Education provided to pt and wife on the following topics: cognitive communication function and general strategies for wife  He will need ongoing education.  Wife verbalized understanding.   Interdisciplinary Collaboration: nursing, discussed during team conference.     Safety: in chair, alarm on, virtual sitter, in person sitter, wife at bedside.    Therapy Time  SLP Individual Minutes  Time In: 1034  Time Out: 1100  Minutes: 26          ODELL Horn, CCC-SLP    7/29/2025 11:59 AM

## 2025-07-29 NOTE — PROGRESS NOTES
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  Still with toe pain, will give 2 more doses of colchicine.  Patient required Ativan once yesterday at 2 PM, per staff behavior waxes and wanes  Team care conference held and attended  All questions and concerns were addressed at this time.    Objective:     Current Facility-Administered Medications   Medication Dose Route Frequency    colchicine (COLCRYS) tablet 0.6 mg  0.6 mg Oral BID    QUEtiapine (SEROQUEL) tablet 25 mg  25 mg Oral Nightly    LORazepam (ATIVAN) tablet 0.5 mg  0.5 mg Oral BID PRN    vancomycin (VANCOCIN) 1250 mg in sodium chloride 0.9% 250 mL IVPB  1,250 mg IntraVENous Q24H    [START ON 7/30/2025] cyanocobalamin injection 1,000 mcg  1,000 mcg IntraMUSCular Weekly    Followed by    [START ON 8/27/2025] cyanocobalamin injection 1,000 mcg  1,000 mcg IntraMUSCular Q30 Days    sodium chloride flush 0.9 % injection 5-40 mL  5-40 mL IntraVENous 2 times per day    sodium chloride flush 0.9 % injection 5-40 mL  5-40 mL IntraVENous PRN    thiamine (B-1) injection 500 mg  500 mg IntraVENous TID    acetaminophen (TYLENOL) tablet 650 mg  650 mg Oral Q4H PRN    polyethylene glycol (GLYCOLAX) packet 17 g  17 g Oral Daily PRN    sodium phosphate (FLEET) rectal enema 1 enema  1 enema Rectal Daily PRN    bisacodyl (DULCOLAX) suppository 10 mg  10 mg Rectal Daily PRN    calcium carbonate (TUMS) chewable tablet 500 mg  500 mg Oral TID PRN    hydrALAZINE (APRESOLINE) tablet 10 mg  10 mg Oral TID PRN    ondansetron (ZOFRAN-ODT) disintegrating tablet 4 mg  4 mg Oral Q8H PRN    carboxymethylcellulose (THERATEARS) 1 % ophthalmic gel 1 drop  1 drop Both Eyes TID PRN     family  -Melatonin nightly  -EKG reviewed, QTc within normal limits  - Initiate Seroquel as 25 mg nightly  - Can utilize Ativan 0.5 mg twice daily as needed for agitation  -Inconsistent behaviors throughout the daytime.  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  Fever resolved, leukocytosis resolved.  Currently on IV vancomycin and meropenem x 2 weeks.  End of treatment 8/1  -Thiamine and vitamin B12 initiated.  CMP TSH normal  -Continue IV thiamine x 5 days  -B12 injections daily x 4, last dose 7/29, and then weekly x 4 weeks, followed by monthly injections  -Previously agitated, this appears improved versus resolved  //Decreased independence with ADLs  //Gait instability  - Continue inpatient rehabilitation due to ongoing functional deficits below baseline.   - Anticipated LOS 10-14 days  -Patient exhibits the ability to tolerate active participation in at least three hours of therapy services per day, five days a week to include Physical Therapy and Occupational Therapy in addition to SLP if indicated. Furthermore, underlying medical problems present potential risk for decompensation and therefore requires continued close physician monitoring with management as indicated in addition to 24-hour rehabilitation nursing to manage and progress plan of care; this can be accomplished within the acute rehabilitation setting while addressing functional needs.  - Patient requires continued PT for ambulatory/mobility dysfunction due to meningitis.  Currently requires ongoing intensive Physical Therapy for 2-3 times a week, 1 to 2 hours daily, to address basic mobility including  community. Counseling and coordination of care discussed with patient, nursing staff, therapy team and case management.   The patient's medical conditions, functional status, and progress toward discharge goals was discussed with the treatment team and +/- patient family. The patient appears to be making progress toward discharge goals.  All questions from patient and/or friend/family members were discussed in a group setting to allow individual members of the team voice their concerns and praises of the patient in regards to their current progress in the rehabilitation program.  Please see separate team care conference note for more in-depth details.    Concerns from staff and/or family: Behavior disturbances    Barriers to discharge/disposition: Safety concerns     DME: TBD     other follow- up care needed: Family training to be completed prior to discharge      The patient is able to tolerate and benefit from multidisciplinary acute inpatient rehabilitation. Part of this note may have been written by using a voice dictation software. The note has been proofread but may still contain some grammatical/other typographical errors.      Jesica Oneill D.O. PM&R  Inpatient Rehabilitation Medical Director  July 29, 2025

## 2025-07-29 NOTE — PROGRESS NOTES
[facility] OCCUPATIONAL THERAPY DAILY NOTE   OT Co-Treatment Minutes  Time In: 1119  Time Out: 1204  Minutes: 45             Subjective: Pt agreeable to treatment. \"Who are you?\" Pt unable to recall this therapist from earlier in day.  Pain: RN notified  and R great toe pain.  Interdisciplinary Communication: Collaborated with PT, RN, and care team during Team Conference regarding pt status, pt performance, and handoff communication.   Precautions: Falls, Poor Safety Awareness, Posey Alarm, Cognitive Impairment, and virtual sitter     FUNCTIONAL MOBILITY:       Bed Mobility NT    Sit to Stand MaxA and Dependent   Pt with FWW required MOD A X2 - MAX A X1 varied    Transfer  MaxA and Dependent   Transfer Type: SPT  Equipment: RW  SPT second person for safety d/t functional cognitive deficits.    Ambulation Praveen and ModA  Equipment: RW       - Activity Tolerance - Balance - Cognition - Coordination - Range of Motion - Strengthening - Visual-Perceptual    Pt participated in functional mobility for improved ADL/IADL performance with FWW with MIN/MOD A with FWW, w/c follow to ensure pt safety with improved upright endurance for ADL's. Cues on safety awareness for t/f technique and mobility. Required rest break following.     Pt engaged in Sequence board game while sitting and standing to address standing balance/tolerance, visual perceptual skills-saccades/tracking, cognition-problem solving/processing/sequencing/memory/attention, coordination, and activity tolerance for integration into functional tasks. Pt required 6-7 indirect cues following initial game instructions and noted errors. Sequence board placed on a raised/angled wood frame.  Pt stood for 2 bouts of standing task for 4-5 minutes each. Encouragement provided. Pt required re-direction during task.   During standing task therapist directed pt to lean forward for dynamic reaching in various planes of motion to progress posterior lean and standing balance for ADL's.

## 2025-07-29 NOTE — PROGRESS NOTES
[facility] OCCUPATIONAL THERAPY DAILY NOTE  OT Individual Minutes  Time In: 0746  Time Out: 0838  Minutes: 52               Subjective: Pt agreeable to treatment. \"I'm still sleeping, I'm not getting up\" Pt redirectable and then participative with therapy.  Pain: RN notified .C/o posterior buttocks pain and RLE great toe pain  Interdisciplinary Communication: Collaborated with PT and RN regarding pt status, pt performance, and handoff communication.   Precautions:  Falls, Poor Safety Awareness, Posey Alarm, Cognitive Impairment, and virtual sitter       FUNCTIONAL MOBILITY:       Bed Mobility Praveen and ModA    Sit to Stand CGA and Praveen    Transfer  Dependent   Transfer Type: Octavia Nvest  Equipment: Octavia Nvest    Ambulation Dependent   Equipment: Octavia Nvest      ACTIVITIES OF DAILY LIVING:       Eating Supervision or touching assistance SPV seated, pt able to manage containers and initiate   Oral Hygiene Supervision or touching assistance SPV seated with lateral supports to don deoderant   Shower/Bathe Partial/moderate assistance Pt seated for UB/LB sponge bath with cues for sequencing, standing via octavia steady MIN A for thoroughness for posterior cares   Lower Body Dressing Substantial/maximal assistance MAX A for threading brief/pants, pt standing via octavia steady able to doff pants down, A for pants management up/pt able to participate in all steps but A for completion.   Toileting Hygiene Partial/moderate assistance Pt able to manage pants down via octavia steady, cues for seqeuncing and continuation d/t decreased attention/memory of task, pt thoroughness for posterior cares /pt able to participate, A for pants management up over hips. Pt had incontinent episode upon OT arrival.   Toilet Transfer Dependent DEP via octavia steady; STS on octavia steady MIN A. with required v.c.'s   Education Adaptive ADL Techniques, AE/DME Training, Benefits of OT, Energy Conservation, Pacing, Functional Transfer Training, and Safety Awareness

## 2025-07-29 NOTE — PROGRESS NOTES
PHYSICAL THERAPY DAILY NOTE    PT Individual Minutes  Time In: 1302  Time Out: 1347  Minutes: 45 PT Co-Treatment Minutes  Time In: 1119  Time Out: 1204  Minutes: 45               Total Treatment Time:  45 Minutes    Pt. Seen for: PM, Therapeutic Exercise, and Transfer Training     Subjective: \" I am too tired to go out to the gym.\"         Objective: Patient can be redirected and forgot about being tired and participated with therapy. Wife present. When she saw the octavia lift she asked information on it . \" This possibly be something good for home.\" Assisted wife to look it up on [employer].  Restrictions/Precautions: Fall Risk, General Precautions, Bed Alarm                        GROSS ASSESSMENT           COGNITION Daily Assessment    Overall Orientation Status: Impaired            BED/MAT MOBILITY Daily Assessment     Supine to Sit: Unable to assess  Sit to Supine: Partial/Moderate assistance        TRANSFERS Daily Assessment    Sit to Stand: Substantial/Maximal assistance  Stand to Sit: Partial/Moderate assistance;2 Person Assistance  Stand Pivot Transfers: Substantial/Maximal assistance  Lateral Transfers: Dependent/Total (one transfer to [facility] used octavia lift)          GAIT Daily Assessment    Surface: Level tile  Device: Rolling Walker  Assistance: Partial/Moderate assistance;2 Person assistance  Distance: 65 x 1  More Ambulation?: No              STEPS/STAIRS Daily Assessment    Stairs?: No              BALANCE Daily Assessment    Static Sitting: Fair:  Pt. requires UE support;  may need occasional min A  Dynamic Sitting: Poor - unable to accept challenge or move without LOB   Static Standing:   Dynamic Standing:        WHEELCHAIR MOBILITY Daily Assessment                          LOWER EXTREMITY EXERCISES   Rode motomed x 10 minutes. Rest of session in bathroom requiring max assist with hygiene and clothing management.     Pain level: no pain noted this session  Pain Location:    Pain Interventions:     Vital  Signs:  /77   Pulse 68   Temp 98.4 °F (36.9 °C) (Oral)   Resp 18   Wt 93.4 kg (205 lb 12.8 oz)   SpO2 97%   BMI 29.53 kg/m²       Education:          Interdisciplinary Communication: Discussed with wife family training to be scheduled next week .    Pt. Left in bed call bell in reach needs in reach bed/chair alarm activated family at bedside         Assessment: Patient seemed fatigued after session.         Plan of Care: Continue with plan of care.    Santa Browne, PTA  7/29/2025

## 2025-07-29 NOTE — PLAN OF CARE
Problem: Safety - Adult  Goal: Free from fall injury  7/29/2025 0754 by Mei Ventura RN  Outcome: Progressing  7/29/2025 0052 by Sayra Zuñiga RN  Outcome: Progressing     Problem: Skin/Tissue Integrity  Goal: Skin integrity remains intact  Description: 1.  Monitor for areas of redness and/or skin breakdown  2.  Assess vascular access sites hourly  3.  Every 4-6 hours minimum:  Change oxygen saturation probe site  4.  Every 4-6 hours:  If on nasal continuous positive airway pressure, respiratory therapy assess nares and determine need for appliance change or resting period  7/29/2025 0754 by Mei Ventura RN  Outcome: Progressing  7/29/2025 0052 by Sayra Zuñiga RN  Outcome: Progressing  Flowsheets (Taken 7/28/2025 1943 by Mitra Lentz, RN)  Skin Integrity Remains Intact: Monitor for areas of redness and/or skin breakdown     Problem: Pain  Goal: Verbalizes/displays adequate comfort level or baseline comfort level  7/29/2025 0754 by Mei Ventura RN  Outcome: Progressing  7/29/2025 0052 by Sayra Zuñiga RN  Outcome: Progressing

## 2025-07-29 NOTE — PROGRESS NOTES
PHYSICAL THERAPY DAILY NOTE      PT Co-Treatment Minutes  Time In: 1119  Time Out: 1204  Minutes: 45               Total Treatment Time:  45 Minutes    Pt. Seen for: AM, Gait Training, Therapeutic Exercise, and Transfer Training     Subjective: Patient seemed agreeable to therapy .         Objective:Co treated with OT.  Restrictions/Precautions: Fall Risk, General Precautions, Bed Alarm                        GROSS ASSESSMENT           COGNITION Daily Assessment    Overall Orientation Status: Impaired            BED/MAT MOBILITY Daily Assessment     Supine to Sit: Unable to assess  Sit to Supine: Unable to assess        TRANSFERS Daily Assessment    Sit to Stand: Partial/Moderate assistance;2 Person Assistance  Stand to Sit: Partial/Moderate assistance;2 Person Assistance  Stand Pivot Transfers: Partial/Moderate assistance;2 Person Assistance          GAIT Daily Assessment    Surface: Level tile  Device: Rolling Walker  Assistance: Partial/Moderate assistance;2 Person assistance  Distance: 65 x 1  More Ambulation?: No              STEPS/STAIRS Daily Assessment    Stairs?: No              BALANCE Daily Assessment    Static Sitting: Fair:  Pt. requires UE support;  may need occasional min A  Dynamic Sitting: Poor - unable to accept challenge or move without LOB   Static Standing: Poor:  Pt. requires UE support & mod-max A to maintain position  Dynamic Standing: Poor - unable to accept challenge or move without LOB        WHEELCHAIR MOBILITY Daily Assessment                          LOWER EXTREMITY EXERCISES   Worked in standing participating in card matching game. Worked on balance reaching for cards and finding match. Part of session was done in sitting when right foot started hurting.     Pain level: pain reported but not rated  Pain Location:  right big toe  Pain Interventions:     Vital Signs:  /77   Pulse 68   Temp 98.4 °F (36.9 °C) (Oral)   Resp 18   Wt 93.4 kg (205 lb 12.8 oz)   SpO2 97%   BMI 29.53

## 2025-07-29 NOTE — PROGRESS NOTES
VANCO DAILY FOLLOW UP NOTE  [facility]   Pharmacy Pharmacokinetic Monitoring Service - Vancomycin    Consulting Provider: Dr. Oneill   Indication: CNS infection  Target Concentration: Goal AUC/JEANNE 400-600 mg*hr/L  EoT: 8/2/2025  Additional Antimicrobials: meropenem    Pertinent Laboratory Values:   Wt Readings from Last 1 Encounters:   07/29/25 93.4 kg (205 lb 12.8 oz)     Temp Readings from Last 1 Encounters:   07/29/25 98.6 °F (37 °C) (Oral)     Recent Labs     07/28/25  0523   BUN 22   CREATININE 1.73*   WBC 6.2     Estimated Creatinine Clearance: 44 mL/min (A) (based on SCr of 1.73 mg/dL (H)).    Lab Results   Component Value Date/Time    VANCORANDOM 24.0 07/27/2025 05:30 AM       MRSA Nasal Swab: N/A. Non-respiratory infection    Assessment:  Date/Time Dose Concentration AUC   7/20 0652 1250 mg q24h 10.4 488   7/22 0628 1250 mg q24h 11.4 379   7/25 0319 1250 mg q18h 22 621   7/27 0530 1500 mg q24h 24 581   Note: Serum concentrations collected for AUC dosing may appear elevated if collected in close proximity to the dose administered, this is not necessarily an indication of toxicity    Plan:  Dosing recommendations based on Bayesian software   Will continue vancomycin 1250 mg IV q24h  Repeat vancomycin concentrations will be ordered as clinically appropriate   Pharmacy will continue to monitor patient and adjust therapy as indicated    Thank you for the consult,  Shawn Peraza RPH

## 2025-07-29 NOTE — PROGRESS NOTES
Hospitalist Progress Note   Admit Date:  2025 11:33 AM   Name:  Azam May Jr.   Age:  75 y.o.  Sex:  male  :  1950   MRN:  [id number]   Room:  [facility]    Presenting/Chief Complaint: No chief complaint on file.     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 is a 75 y.o. male with history of GERD, hypertension, IBS, insomnia, hypercholesterolemia normal pressure hydrocephalus, stage III kidney disease currently hospitalized for meningitis postprocedure.  Undergoing physical therapy at inpatient rehab center.  Was consulted for aggressive behavior.  Reportedly, patient was being aggressive with staff and had frequent mood swings today.  Security had been to his room and he was able to calm down.      Spoke with nursing staff on morning of  patient still with inappropriate verbal outburst with nursing/ancillary staff    Subjective & 24hr Events:   Patient is alert and oriented, answering questions appropriately. States he had a decent nights rest. Not currently agitated. Did not require PRN ativan overnight.     Assessment & Plan:     Aggressive behavior  Based on discussions with other care members, patient's sundowning appears consistent with prior episodes.  Currently on IV vancomycin and meropenem for postprocedure meningitis. Afebrile, no leukocytosis - seems to be responding to treatment.     - schedule nightly Seroquel 25mg   - PRN ativan 0.5mg PO for agitation  - Continue high-dose thiamine  - Delirium precautions below     Management of Delirium      Non-pharmacological intervention  Reorient the patient throughout the day  Window open and lights on during the day. Lights off, television off, noises down during the night. If able, decrease nursing checks at night  Therapies as often as possible  Avoid restraints to the best of your ability   Avoid sensory deprivation by using glasses and hearing aids, if

## 2025-07-29 NOTE — PATIENT CARE CONFERENCE
[facility] [place]  INPATIENT REHABILITATION  TEAM CONFERENCE NOTE    Conference Date: 2025  Admit Date: 2025 11:33 AM  Patient Name: Azam May Jr.    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  ---------------------------------------------------------------------------------------------------    Case Management:  Patient's PLOF: Independent with IADLs  Patient's Prior Living Situation: Lives with spouse/significant other  Baseline Supervision/Assistance: None  Current issues/needs regarding patient and family discharge status: Patents cognition/agitation    ---------------------------------------------------------------------------------------------------    Functional Assessment:  Most Recent Mobility Scores Per Physical Therapy:   Bed/Mat Mobility Bridging: Contact guard assistance  Rolling to Left: Minimal assistance  Rolling to Right: Minimal assistance  Supine to Sit: Unable to assess  Sit to Supine: Unable to assess  Scooting: Contact guard assistance   Transfers Sit to Stand: Substantial/Maximal assistance  Stand to Sit: Substantial/Maximal assistance  Bed to Chair: Partial/Moderate assistance  Stand Pivot Transfers: Substantial/Maximal assistance;2 Person Assistance    Gait Surface: Level tile  Device: Rolling Walker  Assistance: Substantial/Maximal assistance  Gait Deviations: Decreased step height;Decreased arm swing;Slow Marissa;Decreased head and trunk rotation;Decreased step length;Deviated path;Shuffles;Staggers  Distance: 10 x 3  More Ambulation?: No   Ambulation 2  Surface - 2:  DIET; Regular  ADULT ORAL NUTRITION SUPPLEMENT; HS Snack; Snack; as needed night time snack    Deficiencies: Safety, cognition, agitation,     ---------------------------------------------------------------------------------------------------  Vitals:    07/28/25 1518 07/28/25 1943 07/29/25 0650 07/29/25 0730   BP: (!) 130/90 (!) 134/57  113/74   Pulse: 83 88  68   Resp: 17 18 17   Temp: 98.4 °F (36.9 °C) 98.6 °F (37 °C)  98.6 °F (37 °C)   TempSrc: Oral Oral  Oral   SpO2: 98% 99%  96%   Weight:   93.4 kg (205 lb 12.8 oz)               Medical Impediments: Medication/medical management ongoing for: Remains on IV Abt,     Patient requires continued close monitoring of the following systems: CV: Monitor blood pressure and heart rate.  Adjust medications as needed; Endocrine: Monitor glycemia and adjust pharmacologic regimen as needed; Respiratory: Incentive spirometer every 2 hours while awake, as needed.  To be followed by respiratory therapist as needed; Sleep: Monitor sleep cycles and improve sleep quality as necessary to ensure adequate rest to be able to participate in daily therapy sessions; Skin: Monitor incisions/wounds for adequate healing.  Wound care coordinator consulted as needed.  Frequent turning while in bed and repositioning in chair.  Monitor for skin breakdown or erythema; Psychiatric: Monitor for signs and symptoms of depression.  Monitor appetite.  Monitor for depression anxiety as the patient is adjusting to disability.  Monitor and adjust medications as needed; Nutrition: Continue current diet and adjust as needed and as tolerated.  Consult dietitian for nutritional assessment and recommendations; ID: Patient will be monitored closely for any signs or symptoms of infection, such as elevated white blood cell count, increased temperature, signs of UTI; Pain: The patient will be monitored closely for signs and symptoms of pain.  Pain regimen will be adjusted as needed; Cognitive function/mental

## 2025-07-29 NOTE — CARE COORDINATION
Interdisciplinary Tuesday / Thursday, Team Conference Meeting Notes    Interdisciplinary team conference meeting completed to discuss plan of care.       Attending Staff: (Director) Lizzy Nelson (Therapy Supervisor), Dr. Oneill, (CM) Minda, (PT) Jaqueline Ford Dana, Erin, (OT) Angela Hale Joy, (SLP) Candy      Family Members Attending: Lianna May (spouse)      Estimated D/C Date: 08/08/25      Recommended Follow-Up Therapy: Staff has recommended (HH) PT/OT/RN/Aide/SW      SNF Facility: NA      Home Health: TBD    Dialysis: NA  Name of Dialysis Facility: NA      DME: wheelchair ( to be given at [place])      Family Training: Therapy will schedule family training with family.      Communication with family/caregivers:  CM reviewed / screen patient medical chart for continued stay. Patient needs are continue to be followed by Dr. Jesica Oneill. Patient was admitted on 07/25/25.  Patient has been approved for estimated length of stay for 16 days. Patient has currently used 4 out of estimated length of stay days. Team Conference staff met with patient / spouse and discussed patient progress and barriers for discharge home. Dr. Oneill has recommended a discharge date 08/08/25 / therapy staff has recommended (HH) / DME's and family training.  Patient / spouse / family has requested a referral be made (TBD) for (HH) / DME (wheelchair) referral to be completed.  Provider for DME (TBD) and will be delivered to patient room at discharge. Family training has been requested. Therapy staff will schedule family training.  CM will continue to follow patient plan of care and assist with supportive care. CM will continue to follow / monitor for any needs, concerns or questions that may arise.          Name of Ins: Medicare Part A And B   Policy #: [id number]    Secondary Ins: [place] Supp   Policy #: [id number]    Auth #  Admission Date: 07/25/25  Approved Dates:   LOS: 4  Discharge Date: 08/08/25  Contact

## 2025-07-30 PROBLEM — R65.20 SEVERE SEPSIS (HCC): Status: RESOLVED | Noted: 2025-07-18 | Resolved: 2025-07-30

## 2025-07-30 PROBLEM — A41.9 SEVERE SEPSIS (HCC): Status: RESOLVED | Noted: 2025-07-18 | Resolved: 2025-07-30

## 2025-07-30 LAB
EKG ATRIAL RATE: 76 BPM
EKG DIAGNOSIS: NORMAL
EKG P AXIS: -4 DEGREES
EKG P-R INTERVAL: 160 MS
EKG Q-T INTERVAL: 396 MS
EKG QRS DURATION: 84 MS
EKG QTC CALCULATION (BAZETT): 445 MS
EKG R AXIS: -2 DEGREES
EKG T AXIS: -5 DEGREES
EKG VENTRICULAR RATE: 76 BPM
VANCOMYCIN SERPL-MCNC: 20.6 UG/ML

## 2025-07-30 PROCEDURE — 80202 ASSAY OF VANCOMYCIN: CPT

## 2025-07-30 PROCEDURE — 97530 THERAPEUTIC ACTIVITIES: CPT

## 2025-07-30 PROCEDURE — APPSS15 APP SPLIT SHARED TIME 0-15 MINUTES: Performed by: PHYSICIAN ASSISTANT

## 2025-07-30 PROCEDURE — 1180000000 HC REHAB R&B

## 2025-07-30 PROCEDURE — 2500000003 HC RX 250 WO HCPCS: Performed by: STUDENT IN AN ORGANIZED HEALTH CARE EDUCATION/TRAINING PROGRAM

## 2025-07-30 PROCEDURE — 97535 SELF CARE MNGMENT TRAINING: CPT

## 2025-07-30 PROCEDURE — 97110 THERAPEUTIC EXERCISES: CPT

## 2025-07-30 PROCEDURE — 6370000000 HC RX 637 (ALT 250 FOR IP): Performed by: STUDENT IN AN ORGANIZED HEALTH CARE EDUCATION/TRAINING PROGRAM

## 2025-07-30 PROCEDURE — 6360000002 HC RX W HCPCS: Performed by: STUDENT IN AN ORGANIZED HEALTH CARE EDUCATION/TRAINING PROGRAM

## 2025-07-30 PROCEDURE — 93005 ELECTROCARDIOGRAM TRACING: CPT | Performed by: INTERNAL MEDICINE

## 2025-07-30 PROCEDURE — 36415 COLL VENOUS BLD VENIPUNCTURE: CPT

## 2025-07-30 PROCEDURE — 92507 TX SP LANG VOICE COMM INDIV: CPT

## 2025-07-30 PROCEDURE — 93010 ELECTROCARDIOGRAM REPORT: CPT | Performed by: INTERNAL MEDICINE

## 2025-07-30 PROCEDURE — 6360000002 HC RX W HCPCS: Performed by: NURSE PRACTITIONER

## 2025-07-30 PROCEDURE — 2580000003 HC RX 258: Performed by: NURSE PRACTITIONER

## 2025-07-30 PROCEDURE — 2580000003 HC RX 258: Performed by: STUDENT IN AN ORGANIZED HEALTH CARE EDUCATION/TRAINING PROGRAM

## 2025-07-30 RX ORDER — GABAPENTIN 100 MG/1
200 CAPSULE ORAL NIGHTLY
Status: DISCONTINUED | OUTPATIENT
Start: 2025-07-30 | End: 2025-07-31

## 2025-07-30 RX ORDER — QUETIAPINE FUMARATE 25 MG/1
25 TABLET, FILM COATED ORAL NIGHTLY
Status: DISPENSED | OUTPATIENT
Start: 2025-07-31

## 2025-07-30 RX ORDER — LACTOBACILLUS RHAMNOSUS GG 10B CELL
1 CAPSULE ORAL
Status: DISPENSED | OUTPATIENT
Start: 2025-07-30

## 2025-07-30 RX ADMIN — CYANOCOBALAMIN 1000 MCG: 1000 INJECTION, SOLUTION INTRAMUSCULAR; SUBCUTANEOUS at 09:31

## 2025-07-30 RX ADMIN — MEROPENEM 2000 MG: 1 INJECTION INTRAVENOUS at 10:29

## 2025-07-30 RX ADMIN — Medication 3 MG: at 21:13

## 2025-07-30 RX ADMIN — QUETIAPINE FUMARATE 25 MG: 25 TABLET ORAL at 09:32

## 2025-07-30 RX ADMIN — GABAPENTIN 200 MG: 100 CAPSULE ORAL at 21:12

## 2025-07-30 RX ADMIN — Medication 1 CAPSULE: at 12:47

## 2025-07-30 RX ADMIN — MEROPENEM 2000 MG: 1 INJECTION INTRAVENOUS at 23:02

## 2025-07-30 RX ADMIN — ACETAMINOPHEN 650 MG: 325 TABLET ORAL at 21:12

## 2025-07-30 RX ADMIN — SODIUM CHLORIDE, PRESERVATIVE FREE 10 ML: 5 INJECTION INTRAVENOUS at 21:15

## 2025-07-30 RX ADMIN — SODIUM CHLORIDE, PRESERVATIVE FREE 10 ML: 5 INJECTION INTRAVENOUS at 22:57

## 2025-07-30 RX ADMIN — COLCHICINE 0.6 MG: 0.6 TABLET, FILM COATED ORAL at 09:31

## 2025-07-30 RX ADMIN — SODIUM CHLORIDE, PRESERVATIVE FREE 10 ML: 5 INJECTION INTRAVENOUS at 09:30

## 2025-07-30 RX ADMIN — DONEPEZIL HYDROCHLORIDE 5 MG: 5 TABLET, FILM COATED ORAL at 21:13

## 2025-07-30 RX ADMIN — VANCOMYCIN HYDROCHLORIDE 1250 MG: 10 INJECTION, POWDER, LYOPHILIZED, FOR SOLUTION INTRAVENOUS at 17:11

## 2025-07-30 ASSESSMENT — PAIN SCALES - GENERAL
PAINLEVEL_OUTOF10: 7
PAINLEVEL_OUTOF10: 0

## 2025-07-30 ASSESSMENT — PAIN SCALES - WONG BAKER: WONGBAKER_NUMERICALRESPONSE: NO HURT

## 2025-07-30 ASSESSMENT — PAIN - FUNCTIONAL ASSESSMENT: PAIN_FUNCTIONAL_ASSESSMENT: ACTIVITIES ARE NOT PREVENTED

## 2025-07-30 ASSESSMENT — PAIN DESCRIPTION - DESCRIPTORS: DESCRIPTORS: ACHING

## 2025-07-30 ASSESSMENT — PAIN DESCRIPTION - ORIENTATION: ORIENTATION: ANTERIOR

## 2025-07-30 ASSESSMENT — PAIN DESCRIPTION - LOCATION: LOCATION: HEAD

## 2025-07-30 NOTE — PROGRESS NOTES
VANCO DAILY FOLLOW UP NOTE  [facility]   Pharmacy Pharmacokinetic Monitoring Service - Vancomycin    Consulting Provider: Dr. Oneill   Indication: CNS infection  Target Concentration: Goal AUC/JEANNE 400-600 mg*hr/L  EoT: 8/2/2025 per ID on 07/25/2025  Additional Antimicrobials: meropenem    Pertinent Laboratory Values:   Wt Readings from Last 1 Encounters:   07/29/25 93.4 kg (205 lb 12.8 oz)     Temp Readings from Last 1 Encounters:   07/30/25 97.7 °F (36.5 °C) (Oral)     Recent Labs     07/28/25  0523   BUN 22   CREATININE 1.73*   WBC 6.2     Estimated Creatinine Clearance: 44 mL/min (A) (based on SCr of 1.73 mg/dL (H)).    Lab Results   Component Value Date/Time    VANCORANDOM 20.6 07/30/2025 04:46 AM       MRSA Nasal Swab: N/A. Non-respiratory infection    Assessment:  Date/Time Dose Concentration AUC   7/20 0652 1250 mg q24h 10.4 488   7/22 0628 1250 mg q24h 11.4 379   7/25 0319 1250 mg q18h 22 621   7/27 0530 1500 mg q24h 24 581   7/30 0446 1250 mg q24h 20.6 486   Note: Serum concentrations collected for AUC dosing may appear elevated if collected in close proximity to the dose administered, this is not necessarily an indication of toxicity    Plan:  Dosing recommendations based on Bayesian software   Random level drawn on 07/30 within therapeutic AUC range. Continue vancomycin 1250 mg IV q24h.   Repeat vancomycin concentrations will be ordered as clinically appropriate   Pharmacy will continue to monitor patient and adjust therapy as indicated    Thank you for the consult,  Julio Zavaleta [facility]

## 2025-07-30 NOTE — PROGRESS NOTES
[facility] OCCUPATIONAL THERAPY DAILY NOTE  OT Individual Minutes  Time In: 1116  Time Out: 1148  Minutes: 32               Subjective: Pt agreeable to treatment. \"I feel like shit\".   Pain: RN notified .  Interdisciplinary Communication: Collaborated with PT, RN, SLP, and Hospitalist present during end of session regarding pt status, pt performance, and handoff communication.   Precautions: Falls, Poor Safety Awareness, Posey Alarm, Cognitive Impairment, and virtual sitter     Vitals:   Seated in recliner following session- /58 (70), HR 36-39, SpO2 98%. RN and hospitalist present during post session.     FUNCTIONAL MOBILITY:       Bed Mobility NT    Sit to Stand Dependent     Transfer  Dependent   Transfer Type: Octavia Stedy  Equipment: Octavia Stedy    Ambulation NT  Equipment: NA       - Activity Tolerance - Cognition - Coordination - Visual-Perceptual    Upon OT arrival, pt more alert and engaged in conversation. Pt having difficulties calling spouse, unable to. Therapist assisted pt. Pt frustrated that spouse did not answer.     Pt engaged in PVC pipe-tree activity while sitting to address activity tolerance, functional reach, coordination, cognition-problem solving/attention/sequencing/memory/processing, and vision/perception. Pt utilized PVC pipe and connector pieces to build various structures according to design illustrations. Out of a 10 piece PVC pipe activity, pt able to complete 2/10 with required increased time. Pt dosing off frequently with v.c.'s for attending to task. Pt lethargic. Pt required step by step cues for steps but able to with indirect cues complete first few steps, but then decreased participation d/t lethargy.     Pt became lethargic/dozing off required frequent v.c.'s and t.c.'s to remain alert, despite cues and activity pt unable to remain awake during task. Assisted pt back to recliner chair with required 2 person A for STS via octavia steady from w/c > recliner chair. Dozing not behavioral

## 2025-07-30 NOTE — PROGRESS NOTES
PHYSICAL THERAPY DAILY NOTE    PT Individual Minutes  Time In: 1300  Time Out: 1400  Minutes: 60                 Total Treatment Time:  60 Minutes    Pt. Seen for: PM, Therapeutic Exercise, and Transfer Training     Subjective: \" I just feel tired. I dont know where my wife is.\"         Objective: Patient a little frustrated today. Discussed with patient that wife is coming in later today.  Restrictions/Precautions: Fall Risk, General Precautions, Bed Alarm                        GROSS ASSESSMENT           COGNITION Daily Assessment    Overall Orientation Status: Impaired            BED/MAT MOBILITY Daily Assessment     Supine to Sit: Partial/Moderate assistance  Sit to Supine: Partial/Moderate assistance        TRANSFERS Daily Assessment    Sit to Stand: Substantial/Maximal assistance  Stand to Sit: Substantial/Maximal assistance  Stand Pivot Transfers: Substantial/Maximal assistance;2 Person Assistance  Lateral Transfers: Dependent/Total (used octavia lift this AM secondary to confusion and fatigue from the shower)          GAIT Daily Assessment                   STEPS/STAIRS Daily Assessment    Stairs?: No              BALANCE Daily Assessment    Static Sitting: Fair:  Pt. requires UE support;  may need occasional min A  Dynamic Sitting: Fair - accepts minimal challenge;  can maintain balance while turning head/trunk  Static Standing: Poor:  Pt. requires UE support & mod-max A to maintain position  Dynamic Standing: Poor - unable to accept challenge or move without LOB        WHEELCHAIR MOBILITY Daily Assessment                          LOWER EXTREMITY EXERCISES        Pain level: pain reported but not rated  Pain Location:  headache  Pain Interventions:     Vital Signs:  BP (!) 166/97   Pulse 60   Temp 98.1 °F (36.7 °C) (Oral)   Resp 18   Wt 93.4 kg (205 lb 12.8 oz)   SpO2 96%   BMI 29.53 kg/m²       Education:          Interdisciplinary Communication: Discussed with nursing concerning low HR earlier.    Pt.

## 2025-07-30 NOTE — CARE COORDINATION
Anticipate DC 8/8/25 with h/h RN/PT/OT/Aide/SW, referral will be sent closer to DC and DC plan confirmed, DME needs=W/C per notes. CM will follow DC plan in rounds Thursday.

## 2025-07-30 NOTE — PROGRESS NOTES
End of Shift Note      Acute Onset Mental Status change? No    Does the patient have Inattention? Fluctuates    Does the patient have Disorganized Thinking? Fluctuates    Does the patient have Altered Level of Consciousness? Behavior not present    Is the patient impulsive? Yes    Expression of Ideas and Wants  Does the patient express ideas and wants without difficulty? Yes    Does the patient understand verbal and non-verbal content without cues or repetition? Yes    Self-Care  Eating:  Did the patient eat by mouth? Yes; The patient requires minimum help: Set-up or clean-up;        Toileting Hygiene:  Did the patient void or have a bowel movement? Yes; Requires two helpers.3 BM episodes requiring much hygiene, thank you to PERRI Ramos for the good help. Brief changed, skin cleaned.     The patient uses a toilet;  sit-to-stand used to get to BR    In: -   Out: 3  In: -   Out: 3 [Urine:2]    Shower/Bathe Self:  Did the patient have a shower or bath? Worked with therapy      Chair/bed-to-chair Transfer:  Did the patient transfer to and from a bed to a chair or wheelchair? Sit-to-stand    Toilet Transfer:    Did the patient use a standard toilet with or without a raised seat or bedside commode? Yes; Requires two helpers. Requires sit-to-stand    Bladder   Does the patient urinate? Yes  Does the patient have a catheter? No; Is the patient incontinent? No  Does the patient have stress incontinence? No    Bowel  Date of last BM 7/30   Does the patient have an ostomy? No; Is the patient incontinent? Yes;  Date of last incontinent episode: 7/30      Episode of bradycardia, dr clements at station and to  bedside, new order received, dr christianson updated at once in person.  Denied pain. BM x2.    TELE BOX 8 added, stat EKG.     Pain  Does the patient have pain that affects any of the following: Sleep, Therapy Activities, or Day-to-Day Activities? None of the activities are affected.

## 2025-07-30 NOTE — PROGRESS NOTES
Hospitalist Progress Note   Admit Date:  2025 11:33 AM   Name:  Azam May Jr.   Age:  75 y.o.  Sex:  male  :  1950   MRN:  [id number]   Room:  [facility]    Presenting/Chief Complaint: No chief complaint on file.     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 is a 75 y.o. male with history of GERD, hypertension, IBS, insomnia, hypercholesterolemia normal pressure hydrocephalus, stage III kidney disease currently hospitalized for meningitis postprocedure. Patient undergoing physical therapy at inpatient rehab center.  Hospitalist service consulted due to  aggressive behavior.  Of report- patient was being aggressive with staff and had frequent mood swings. Security had to be called to room and he was able to calm down.        Subjective & 24hr Events:   - Patient seen this am. Alert and oriented x3. No agitation inappropriateness seen/reported. No acute distress noted. About to work with PT at this [facility]. Bradycardia seem/reported- will continue to monitor and to consult Cardiology if bradycardia persists.           Assessment & Plan:     Aggressive behavior  Based on discussions with other care members, patient's sundowning appears consistent with prior episodes.  Currently on IV vancomycin and meropenem for postprocedure meningitis. Afebrile, no leukocytosis - seems to be responding to treatment.     - schedule nightly Seroquel 25mg   - PRN ativan 0.5mg PO for agitation  - Continue high-dose thiamine  - Delirium precautions below     Management of Delirium      Non-pharmacological intervention  Reorient the patient throughout the day  Window open and lights on during the day. Lights off, television off, noises down during the night. If able, decrease nursing checks at night  Therapies as often as possible  Avoid restraints to the best of your ability   Avoid sensory deprivation by using glasses and hearing aids, if

## 2025-07-30 NOTE — PROGRESS NOTES
PHYSICAL THERAPY DAILY NOTE    PT Individual Minutes  Time In: 0918  Time Out: 1008  Minutes: 50                 Total Treatment Time:  50 Minutes    Pt. Seen for: AM, Therapeutic Exercise, and Transfer Training     Subjective: \" I dont want to do anything.\"         Objective:  Restrictions/Precautions: Fall Risk, General Precautions, Bed Alarm                        GROSS ASSESSMENT           COGNITION Daily Assessment    Overall Orientation Status: Impaired            BED/MAT MOBILITY Daily Assessment     Supine to Sit: Unable to assess  Sit to Supine: Unable to assess        TRANSFERS Daily Assessment    Sit to Stand: Partial/Moderate assistance  Stand to Sit: Partial/Moderate assistance  Lateral Transfers: Dependent/Total (used octvaia lift this AM secondary to confusion and fatigue from the shower)          GAIT Daily Assessment                   STEPS/STAIRS Daily Assessment    Stairs?: No              BALANCE Daily Assessment    Static Sitting: Poor:  Pt. requires UE support & mod-max A to maintain position  Dynamic Sitting: Poor - unable to accept challenge or move without LOB   Static Standing: Total:  Pt. unable to maintain without total support  Dynamic Standing: NT       WHEELCHAIR MOBILITY Daily Assessment                          LOWER EXTREMITY EXERCISES   Rode motomed x 20 minutes.     Pain level: no pain noted just fatigue  Pain Location:    Pain Interventions:     Vital Signs:  BP (!) 105/58   Pulse 54   Temp 97.7 °F (36.5 °C) (Oral)   Resp 16   Wt 93.4 kg (205 lb 12.8 oz)   SpO2 96%   BMI 29.53 kg/m²       Education:          Interdisciplinary Communication:     Pt. Left in recliner call bell in reach needs in reach bed/chair alarm activated         Assessment: Patient very negative today and complained of fatigue from shower this AM. When redirected he was able to participate.         Plan of Care: Continue with plan of care.    Santa Browne, PTA  7/30/2025

## 2025-07-30 NOTE — PROGRESS NOTES
Called to room by zoya, therapy in room., dr clements in room. Pt HR sustained in 30's answers appropriately, appears more drowsy than earlier. Orders for stat EKG, tele, dr christianson updated in person in room at once.

## 2025-07-30 NOTE — PROGRESS NOTES
End of Shift Note      Acute Onset Mental Status change? No    Does the patient have Inattention? Behavior not present    Does the patient have Disorganized Thinking? Fluctuates    Does the patient have Altered Level of Consciousness? Fluctuates    Is the patient impulsive? Yes    Expression of Ideas and Wants  Does the patient express ideas and wants without difficulty? Yes    Does the patient understand verbal and non-verbal content without cues or repetition? Yes      Toileting Hygiene:  Did the patient void or have a bowel movement? Yes; The patient requires minimum help: Supervision for safety, steadying, or contact guard assistance;    The patient uses a toilet;     In: 824.1 [P.O.:110]  Out: 1  In: 824.1   Out: 1 [Urine:1]      Toilet Transfer:    Did the patient use a standard toilet with or without a raised seat or bedside commode? Yes; Requires set-up or clean-up., Requires supervision for safety, steadying, or contact guard assistance., The patient requires lifting assistance., The patient requires lowering assistance., and The patient requires assistance with both legs.Sleep; Occasionally    Bladder   Does the patient urinate? Yes  Does the patient have a catheter? No; Is the patient incontinent? Yes;  Date of last incontinent episode: 7/30/25  Does the patient have stress incontinence? No    Bowel  Date of last BM  7/29/25  Does the patient have an ostomy? No; Is the patient incontinent? Yes;  Date of last incontinent episode:    7/29/25  Pain  Does the patient have pain that affects any of the following: Sleep, Therapy Activities, or Day-to-Day Activities? Sleep; Occasionally

## 2025-07-30 NOTE — PROGRESS NOTES
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 Patient appears to be tolerating nightly Seroquel; we will initiate daytime dose as well. Patient expresses frustration that he requires assistance to ambulate to toilet. Staff report loose stools; broad spectrum ABX and colchicine likely contributing; we will start probiotic. All questions and concerns were addressed at this time.    Objective:     Current Facility-Administered Medications   Medication Dose Route Frequency    lactobacillus (CULTURELLE) capsule 1 capsule  1 capsule Oral Daily with breakfast    colchicine (COLCRYS) tablet 0.6 mg  0.6 mg Oral BID    QUEtiapine (SEROQUEL) tablet 25 mg  25 mg Oral BID    donepezil (ARICEPT) tablet 5 mg  5 mg Oral Nightly    LORazepam (ATIVAN) tablet 0.5 mg  0.5 mg Oral BID PRN    vancomycin (VANCOCIN) 1250 mg in sodium chloride 0.9% 250 mL IVPB  1,250 mg IntraVENous Q24H    cyanocobalamin injection 1,000 mcg  1,000 mcg IntraMUSCular Weekly    Followed by    [START ON 8/27/2025] cyanocobalamin injection 1,000 mcg  1,000 mcg IntraMUSCular Q30 Days    sodium chloride flush 0.9 % injection 5-40 mL  5-40 mL IntraVENous 2 times per day    sodium chloride flush 0.9 % injection 5-40 mL  5-40 mL IntraVENous PRN    acetaminophen (TYLENOL) tablet 650 mg  650 mg Oral Q4H PRN    polyethylene glycol (GLYCOLAX) packet 17 g  17 g Oral Daily PRN    sodium phosphate (FLEET) rectal enema 1 enema  1 enema Rectal Daily PRN    bisacodyl (DULCOLAX) suppository 10 mg  10 mg Rectal Daily PRN    calcium carbonate (TUMS) chewable tablet 500 mg  500 mg Oral TID PRN    hydrALAZINE (APRESOLINE) tablet 10 mg  10 mg Oral TID PRN    ondansetron  damir gallo d/t incontinent episode  CARE Score: 2  Putting On/Taking Off Footwear  Assistance Needed: Substantial/maximal assistance  Comment: Pt seated in recliner chair and unable to fix socks with figure four/difficulty command following; MAX A adjustement of socks  CARE Score: 2  Toileting Hygiene  Assistance needed: Substantial/maximal assistance  Comment: MAX A chato cares/pants with incontinent BM/urine this date EOB  CARE Score: 2  Toilet Transfer  Assistance needed: Dependent  Comment: MAGAN gallo  CARE Score: 1        Speech Assessment:  patient with poor insight and impaired memory. he gets frustrated with attempts to engage in more functional tasks or prompt to use strategies as he thinks he is doing fine and does not need help. he will need close supervision and assistance at home given cognitive status.        Assessment and Plan:   Repeat AM labs tomorrow, 7/31      //Acute gouty flare-up  //Right ankle pain  -Obtain ankle x-ray today to rule out acute pathological injury  -Tenderness to light touch noted right first metatarsal joint  -No erythema or warmth  -Administer 1.2 mg colchicine now, 0.6 mg colchicine at 6 PM  -If improvement noted, can administer another 0.6 mg of colchicine tomorrow.  Can also initiate allopurinol  -7/29 will administer 2 more doses of colchicine  -May benefit from pulsed dose prednisone, although caution given recent meningitis and currently on Merrem and vancomycin  -X-ray independently reviewed by myself. No acute pathological abnormalities noted  -Continue Tylenol as needed. Ice as needed    //Bowel incontinence  -Discontinue stool softeners as docusate senna likely contributing  -Broad spectrum ABX and colchicine likely contributing to loose stools; start probiotic  Last BM (including prior to admit): 07/30/25    //Headache  -Noted after cognitive tasks with speech therapy  -Will initiate gabapentin nightly  -Can utilize Tylenol as needed for  headache    //Lower extremity dysesthesias  -Most notable in right lower extremity to light touch. Per patient's wife this has been ongoing for greater than 3 months  -Ultrasound ruled out right lower extremity DVT  -Initiating gabapentin nightly should help with dysesthesias as well  -Monitor for improvement in pain     //Impulsivity  -VSC order  -Per patient's wife, history of hallucinations prior to hospitalization  -Patient's room close to nursing station, nursing staff aware of impulsivity  -Redirect and reorient patient frequently  -May benefit from nighttime medication to help with sleep quality to improve daytime behaviors  -Worsening behaviors over the weekend.  Ordered 1:1 sitter, charge nurse reach out to patient's family  -Melatonin nightly  -EKG reviewed, QTc within normal limits  -Initiate Seroquel as 25 mg nightly  -Can utilize Ativan 0.5 mg twice daily as needed for agitation  -Inconsistent behaviors throughout the daytime. 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 Fever resolved, leukocytosis resolved. Currently on IV vancomycin and meropenem x 2 weeks. End of treatment 8/1  -Thiamine and vitamin B12 initiated. CMP, TSH normal  -Continue IV thiamine x 5 days  -B12 injections daily x 4, last dose 7/29, and then weekly x 4 weeks, followed by monthly injections  -Previously agitated, this appears improved vs resolved  //Decreased independence with ADLs  //Gait instability  - Continue inpatient rehabilitation due to ongoing functional deficits below baseline.   - Anticipated LOS 10-14 days  - Patient exhibits the ability to tolerate active

## 2025-07-30 NOTE — PROGRESS NOTES
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 he gets frustrated with attempts to engage in more functional tasks or prompt to use strategies as he thinks he is doing fine and does not need help. he will need close supervision and assistance at home given cognitive status.      GENERAL   Subjective:  \"being pissed off is better than being pissed on\"; inappropriate comments at times  Pain:   Patient does not c/o pain                       OBJECTIVE   Cognitive linguistic tx:  Answered basic questions using memory aid but poor recall and attention to details even holding memory aid. Pt reading information then confabulating or argumentative. Attempted to prompt pt to refer to memory aid a second time, but he threw it. Denies any difficulty with memory. denies need for therapy.   Able to recall 2 activities he completed today.   Patient described 3 activities he enjoys. Decreased recall of specific information including places. Difficulty with long term memory such as what year he retired, moved to [place], etc.  Frequent redirection, de escalation as pt unpredictable with frustration outbursts.       PLAN    Duration/Frequency: Continue to follow patient 4-5 x/week for duration of inpatient rehab stay to address goals listed or until goals met.     Medical Necessity/Other Comments:    Patient is expected to demonstrate progress in cognitive ability to decrease assistance required communication, increase independence with activities of daily living, and increase communication with family/caregivers.    GOALS:   LTG: Patient will develop functional attention skills to effectively attend to and communicate in functional living environment to REJI level 4  LTG: Patient will improve neuro-linguistic abilities to increase safety and independence in functional living environment   STG: Caregiver will verbalize understanding of speech therapy recommendations, applicable compensatory strategies, and suggested techniques to facilitate functional performance, safety, independence   STG: Patient will verbally sequence steps to complete daily task with min cueing   STG: Patient will state orientation information/personal information given prompt for external memory aid use as needed   STG: Patient will use external memory aids to recall daily activities completed with moderate verbal cues  STG: Patient will initiate a response to questions/directions with no more than 2 prompts on >50% of occasions   STG: Patient will immediately repeat back instructions/relevant list/information given moderate cue    PATIENT EDUCATION: Education provided to pt on the following topics: pt not receptive to education.  Interdisciplinary Collaboration: nursing,PT/OT.     Safety: in wheelchair, virtual sitter, in person sitter, RN at bedside    Therapy Time  SLP Individual Minutes  Time In: 1000  Time Out: 1029  Minutes: 29        ODELL Horn, CCC-SLP    7/30/2025 12:33 PM

## 2025-07-30 NOTE — PROGRESS NOTES
[facility] OCCUPATIONAL THERAPY DAILY NOTE  OT Individual Minutes  Time In: 0830  Time Out: 0922  Minutes: 52               Subjective: Pt agreeable to treatment. \"What is this? I already went.\" As pt touched brief with BM on hand.   Pain: RN notified .  Interdisciplinary Communication: Collaborated with PT and RN regarding pt status, pt performance, and handoff communication.   Precautions:   Falls, Poor Safety Awareness, Posey Alarm, Cognitive Impairment, and virtual sitter     FUNCTIONAL MOBILITY:       Bed Mobility Praveen and ModA    Sit to Stand Praveen    Transfer  Dependent   Transfer Type: Octavia Dickson  Equipment: Octavia Stedy    Ambulation Dependent   Equipment: Octavia Colabo      ACTIVITIES OF DAILY LIVING:       Shower/Bathe Partial/moderate assistance Pt seated on BSC for shower this date with use of HHSH/GB, seated pt required v.c.'s for sequencing steps, attention to task. Pt able to wash UB/LB thighs/anterior cares. pt stood with MIN Awith GB use, A for posterior cares/drying posterior cares/BLE's; Overall MOD A, increased time d/t poor cog   Upper Body  Dressing Supervision or touching assistance Semi supine to doff gown with cues   Lower Body Dressing Substantial/maximal assistance MAX A for pants management via octavia steady d/t incontinent episode   Toileting Hygiene Substantial/maximal assistance MAX A chato cares/pants with incontinent BM/urine this date EOB   Toilet Transfer Dependent DEP octavia steady   Education Adaptive ADL Techniques, AE/DME Training, Benefits of OT, Energy Conservation, Pacing, Functional Transfer Training, and Safety Awareness     Assessment: Patient required significantly increased time for transitions d/t functional cognitive deficits. Pt argumentative throughout session, inappropriate comments, can be sarcastic, unpredictable behavior throughout session, and redirectable most of times. Pt with poor functional cognition. Cues for sequencing ADL's, incontinent episode EOB, MAX A for ADL's. Pt  with poor insight into deficits. Pt demonstrated good participation in OT treatment. Pt continues to benefit from skilled OT services to address remaining deficits and progress toward baseline level of independence and safety. Patient ended session seated on BSC in shower with PT, with RN/CNA, with Virtual/Physical Sitter, and 1:1.   Plan: Continue OT POC.     DONNA GREGG, OT   7/30/2025

## 2025-07-30 NOTE — PLAN OF CARE
Problem: Skin/Tissue Integrity  Goal: Skin integrity remains intact  Description: 1.  Monitor for areas of redness and/or skin breakdown  2.  Assess vascular access sites hourly  3.  Every 4-6 hours minimum:  Change oxygen saturation probe site  4.  Every 4-6 hours:  If on nasal continuous positive airway pressure, respiratory therapy assess nares and determine need for appliance change or resting period  7/30/2025 0740 by Anu Sosa, RN  Outcome: Progressing  7/29/2025 1922 by Mitra Lentz, RN  Outcome: Progressing  Flowsheets (Taken 7/29/2025 1917)  Skin Integrity Remains Intact: Monitor for areas of redness and/or skin breakdown     Problem: Pain  Goal: Verbalizes/displays adequate comfort level or baseline comfort level  7/29/2025 1922 by Mitra Lentz, RN  Outcome: Progressing

## 2025-07-31 LAB
ANION GAP SERPL CALC-SCNC: 10 MMOL/L (ref 7–16)
BASOPHILS # BLD: 0.07 K/UL (ref 0–0.2)
BASOPHILS NFR BLD: 1.5 % (ref 0–2)
BUN SERPL-MCNC: 16 MG/DL (ref 8–23)
CALCIUM SERPL-MCNC: 8.3 MG/DL (ref 8.8–10.2)
CHLORIDE SERPL-SCNC: 109 MMOL/L (ref 98–107)
CO2 SERPL-SCNC: 23 MMOL/L (ref 20–29)
CREAT SERPL-MCNC: 1.44 MG/DL (ref 0.8–1.3)
DIFFERENTIAL METHOD BLD: ABNORMAL
EOSINOPHIL # BLD: 0.29 K/UL (ref 0–0.8)
EOSINOPHIL NFR BLD: 6.1 % (ref 0.5–7.8)
ERYTHROCYTE [DISTWIDTH] IN BLOOD BY AUTOMATED COUNT: 13.9 % (ref 11.9–14.6)
GLUCOSE SERPL-MCNC: 96 MG/DL (ref 70–99)
HCT VFR BLD AUTO: 38.9 % (ref 41.1–50.3)
HGB BLD-MCNC: 12.7 G/DL (ref 13.6–17.2)
IMM GRANULOCYTES # BLD AUTO: 0.18 K/UL (ref 0–0.5)
IMM GRANULOCYTES NFR BLD AUTO: 3.8 % (ref 0–5)
LYMPHOCYTES # BLD: 0.57 K/UL (ref 0.5–4.6)
LYMPHOCYTES NFR BLD: 11.9 % (ref 13–44)
MAGNESIUM SERPL-MCNC: 2.5 MG/DL (ref 1.8–2.4)
MCH RBC QN AUTO: 31.1 PG (ref 26.1–32.9)
MCHC RBC AUTO-ENTMCNC: 32.6 G/DL (ref 31.4–35)
MCV RBC AUTO: 95.1 FL (ref 82–102)
MONOCYTES # BLD: 0.58 K/UL (ref 0.1–1.3)
MONOCYTES NFR BLD: 12.2 % (ref 4–12)
NEUTS SEG # BLD: 3.08 K/UL (ref 1.7–8.2)
NEUTS SEG NFR BLD: 64.5 % (ref 43–78)
NRBC # BLD: 0 K/UL (ref 0–0.2)
PLATELET # BLD AUTO: 200 K/UL (ref 150–450)
PMV BLD AUTO: 10.2 FL (ref 9.4–12.3)
POTASSIUM SERPL-SCNC: 4 MMOL/L (ref 3.5–5.1)
RBC # BLD AUTO: 4.09 M/UL (ref 4.23–5.6)
SODIUM SERPL-SCNC: 143 MMOL/L (ref 136–145)
WBC # BLD AUTO: 4.8 K/UL (ref 4.3–11.1)

## 2025-07-31 PROCEDURE — 97116 GAIT TRAINING THERAPY: CPT

## 2025-07-31 PROCEDURE — 6360000002 HC RX W HCPCS: Performed by: STUDENT IN AN ORGANIZED HEALTH CARE EDUCATION/TRAINING PROGRAM

## 2025-07-31 PROCEDURE — 6370000000 HC RX 637 (ALT 250 FOR IP): Performed by: STUDENT IN AN ORGANIZED HEALTH CARE EDUCATION/TRAINING PROGRAM

## 2025-07-31 PROCEDURE — 2500000003 HC RX 250 WO HCPCS: Performed by: STUDENT IN AN ORGANIZED HEALTH CARE EDUCATION/TRAINING PROGRAM

## 2025-07-31 PROCEDURE — 97535 SELF CARE MNGMENT TRAINING: CPT

## 2025-07-31 PROCEDURE — 2580000003 HC RX 258: Performed by: STUDENT IN AN ORGANIZED HEALTH CARE EDUCATION/TRAINING PROGRAM

## 2025-07-31 PROCEDURE — 36415 COLL VENOUS BLD VENIPUNCTURE: CPT

## 2025-07-31 PROCEDURE — 80048 BASIC METABOLIC PNL TOTAL CA: CPT

## 2025-07-31 PROCEDURE — 97129 THER IVNTJ 1ST 15 MIN: CPT

## 2025-07-31 PROCEDURE — 6360000002 HC RX W HCPCS: Performed by: NURSE PRACTITIONER

## 2025-07-31 PROCEDURE — 97130 THER IVNTJ EA ADDL 15 MIN: CPT

## 2025-07-31 PROCEDURE — 97530 THERAPEUTIC ACTIVITIES: CPT

## 2025-07-31 PROCEDURE — 1180000000 HC REHAB R&B

## 2025-07-31 PROCEDURE — 83735 ASSAY OF MAGNESIUM: CPT

## 2025-07-31 PROCEDURE — 85025 COMPLETE CBC W/AUTO DIFF WBC: CPT

## 2025-07-31 PROCEDURE — 2580000003 HC RX 258: Performed by: NURSE PRACTITIONER

## 2025-07-31 RX ORDER — LOPERAMIDE HYDROCHLORIDE 2 MG/1
2 CAPSULE ORAL 4 TIMES DAILY PRN
Status: DISPENSED | OUTPATIENT
Start: 2025-07-31

## 2025-07-31 RX ORDER — DICYCLOMINE HCL 20 MG
20 TABLET ORAL 4 TIMES DAILY PRN
Status: DISPENSED | OUTPATIENT
Start: 2025-07-31

## 2025-07-31 RX ADMIN — SODIUM CHLORIDE, PRESERVATIVE FREE 10 ML: 5 INJECTION INTRAVENOUS at 20:47

## 2025-07-31 RX ADMIN — MEROPENEM 2000 MG: 1 INJECTION INTRAVENOUS at 14:40

## 2025-07-31 RX ADMIN — Medication 1 CAPSULE: at 09:31

## 2025-07-31 RX ADMIN — VANCOMYCIN HYDROCHLORIDE 1250 MG: 10 INJECTION, POWDER, LYOPHILIZED, FOR SOLUTION INTRAVENOUS at 18:29

## 2025-07-31 RX ADMIN — QUETIAPINE FUMARATE 25 MG: 25 TABLET ORAL at 20:47

## 2025-07-31 RX ADMIN — SODIUM CHLORIDE, PRESERVATIVE FREE 10 ML: 5 INJECTION INTRAVENOUS at 09:31

## 2025-07-31 RX ADMIN — Medication 3 MG: at 20:47

## 2025-07-31 RX ADMIN — DONEPEZIL HYDROCHLORIDE 5 MG: 5 TABLET, FILM COATED ORAL at 20:47

## 2025-07-31 RX ADMIN — MEROPENEM 2000 MG: 1 INJECTION INTRAVENOUS at 22:57

## 2025-07-31 NOTE — PROGRESS NOTES
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  We discussed laboratory results.  We discussed Aricept initiation for dementia.  EKG results reviewed with patient's wife, hospitalist team continues to follow for irregular heart rhythm.  All questions and concerns were addressed at this time.    Objective:     Current Facility-Administered Medications   Medication Dose Route Frequency    dicyclomine (BENTYL) tablet 20 mg  20 mg Oral 4x Daily PRN    loperamide (IMODIUM) capsule 2 mg  2 mg Oral 4x Daily PRN    lactobacillus (CULTURELLE) capsule 1 capsule  1 capsule Oral Daily with breakfast    gabapentin (NEURONTIN) capsule 200 mg  200 mg Oral Nightly    QUEtiapine (SEROQUEL) tablet 25 mg  25 mg Oral Nightly    donepezil (ARICEPT) tablet 5 mg  5 mg Oral Nightly    LORazepam (ATIVAN) tablet 0.5 mg  0.5 mg Oral BID PRN    vancomycin (VANCOCIN) 1250 mg in sodium chloride 0.9% 250 mL IVPB  1,250 mg IntraVENous Q24H    cyanocobalamin injection 1,000 mcg  1,000 mcg IntraMUSCular Weekly    Followed by    [START ON 8/27/2025] cyanocobalamin injection 1,000 mcg  1,000 mcg IntraMUSCular Q30 Days    sodium chloride flush 0.9 % injection 5-40 mL  5-40 mL IntraVENous 2 times per day    sodium chloride flush 0.9 % injection 5-40 mL  5-40 mL IntraVENous PRN    acetaminophen (TYLENOL) tablet 650 mg  650 mg Oral Q4H PRN    polyethylene glycol (GLYCOLAX) packet 17 g  17 g Oral Daily PRN    sodium phosphate (FLEET) rectal enema 1 enema  1 enema Rectal Daily PRN    bisacodyl (DULCOLAX) suppository 10 mg  10 mg Rectal Daily PRN    calcium carbonate (TUMS) chewable tablet 500 mg  500 mg Oral TID PRN    hydrALAZINE  initiate  CARE Score: 4   Oral Hygiene  Assistance Needed: Supervision or touching assistance  Comment: SPV seated with lateral supports to don deoderant  CARE Score: 4  Shower/Bathe Self  Assistance Needed: Partial/moderate assistance  Comment: Pt seated on BSC for shower this date with use of HHSH/GB, seated pt required v.c.'s for sequencing steps, attention to task. Pt able to wash UB/LB thighs/anterior cares. pt stood with MIN Awith GB use, A for posterior cares/drying posterior cares/BLE's; Overall MOD A, increased time d/t poor cog  CARE Score: 3  Upper Body Dressing  Assistance Needed: Supervision or touching assistance  Comment: Semi supine to doff gown with cues  CARE Score: 4  Lower Body Dressing  Assistance Needed: Substantial/maximal assistance  Comment: MAX A for pants management via octavia steady d/t incontinent episode  CARE Score: 2  Putting On/Taking Off Footwear  Assistance Needed: Substantial/maximal assistance  Comment: Pt seated in recliner chair and unable to fix socks with figure four/difficulty command following; MAX A adjustement of socks  CARE Score: 2  Toileting Hygiene  Assistance needed: Dependent  Comment: Continent/incontinent BM bed level, D for octavia steady and total A for clothing and hygiene management  CARE Score: 1  Toilet Transfer  Assistance needed: Dependent  Comment: D octavia steady  CARE Score: 1        Speech Assessment:  impaired memory, confabulatory speech with episodes of frustration outbursts mainly related to his lack of insight. no functional improvements in cognition.        Assessment and Plan:   CBC BMP magnesium reviewed    //Acute gouty flare-up  //Right ankle pain  -Obtain ankle x-ray today to rule out acute pathological injury  -Tenderness to light touch noted right first metatarsal joint  -No erythema or warmth  -Administer 1.2 mg colchicine now, 0.6 mg colchicine at 6 PM  -If improvement noted, can administer another 0.6 mg of colchicine tomorrow.  Can also

## 2025-07-31 NOTE — PROGRESS NOTES
[facility] OCCUPATIONAL THERAPY DAILY NOTE  OT Individual Minutes  Time In: 0753  Time Out: 0830  Minutes: 37               Subjective: Pt with limited agreeability to treatment. \"I'm so sick of that [ADL/OT] s**^...\"  Pain: No pain expressed.  Interdisciplinary Communication: Collaborated with PT, RN, and SLP regarding pt status, pt performance, and handoff communication.   Precautions: Falls, Poor Safety Awareness, Posey Alarm, Cognitive Impairment, and virtual sitter   Lines:IV  O2 Device: RA      FUNCTIONAL MOBILITY:       Bed Mobility CGA, Praveen, and significantly increased time, cues, education and redirection required    Sit to Stand Dependent  and D for octavia steady, Min A-CGA EOB->octavia steady    Transfer  Dependent   Transfer Type: Octavia Stedy  Equipment: Octavia Stedy    Ambulation NT  Equipment: NA      ACTIVITIES OF DAILY LIVING:       Toileting Hygiene Dependent Continent/incontinent BM bed level, D for octavia steady and total A for clothing and hygiene management   Toilet Transfer Dependent D octavia steady   Education Adaptive ADL Techniques, AE/DME Training, Benefits of OT, Energy Conservation, Pacing, Functional Transfer Training, Precautions, and Safety Awareness     Assessment: Patient supine on entry with breakfast tray over lap. Plate cleared of food with brownie remaining. Therapist introduced self and pt reposnded \"I'm so sick of that [ADL/OT] s**^... I would do it myself if they would lef me... what does occupational therapy do what do I need you for?\" Education provided on OT, POC and alternative moderate to higher level activities available to further challenge pt in meaning ful way. Pt reposnded \"I dont want to be any where near a kitchen, so what are we going to do?\". Review and redirection to \"getting [pt] dressed for the day\". Pt, irritated responded \"I'm still eating, come back in 30 minutes\". Minimal food remaining on tray. Pt advised of unit and therapy schedule and advised therapy would return in

## 2025-07-31 NOTE — PROGRESS NOTES
End of Shift Note      Acute Onset Mental Status change? No    Does the patient have Inattention? Behavior not present    Does the patient have Disorganized Thinking? Behavior not present    Does the patient have Altered Level of Consciousness? Behavior not present    Is the patient impulsive? Yes    Expression of Ideas and Wants  Does the patient express ideas and wants without difficulty? Yes    Does the patient understand verbal and non-verbal content without cues or repetition? Yes      In: -   Out: 2  In: -   Out: 2 [Urine:1]        Bladder   Does the patient urinate? Yes  Does the patient have a catheter? No; Is the patient incontinent? Yes;  Date of last incontinent episode: 7/31/25  Does the patient have stress incontinence? No    Bowel  Date of last BM 7/30/25   Does the patient have an ostomy? No  date of inc episode7/30/25    Pain  Does the patient have pain that affects any of the following: Sleep, Therapy Activities, or Day-to-Day Activities? Sleep; Frequently

## 2025-07-31 NOTE — PROGRESS NOTES
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 no functional improvements in cognition.      GENERAL   Subjective:  pt on phone with his wife \"the little pest is in here\" (referring to SLP)    Pain:   Patient does not c/o pain                       OBJECTIVE   Cognitive linguistic tx-  Attempted to explain therapy schedule given pt frustration with times of therapist arrival.   Patient responded to questions related to orientation/situation with variable cues ranging from min to moderate-max cues for reasoning, problem solving, recall. Confabulatory with frequent contradictory statements. For example, pt talking about being at a house last night;however, within same utterance talking about being sick at the hospital.   Patient will not use external memory aids. Increases agitation.         PLAN    Duration/Frequency: Continue to follow patient 4-5 x/week for duration of inpatient rehab stay to address goals listed or until goals met.     Medical Necessity/Other Comments:   Patient is expected to demonstrate progress in cognitive ability to decrease assistance required communication, increase independence with activities of daily living, and increase communication with family/caregivers.    GOALS:   LTG: Patient will

## 2025-07-31 NOTE — PROGRESS NOTES
PHYSICAL WEEKLY PROGRESS NOTE      PT Individual Minutes  Time In: 1300  Time Out: 1345  Minutes: 45                     Total Treatment Time: 45 Minutes    Subjective: I remember you. I have pooped in my pants.                Objective:     Precautions:      Restrictions/Precautions: Fall Risk, General Precautions, Bed Alarm                Vital Signs:/78   Pulse 76   Temp 97.3 °F (36.3 °C) (Oral)   Resp 20   Wt 93.4 kg (205 lb 12.8 oz)   SpO2 95%   BMI 29.53 kg/m²       Pain level: none reported  Pain location:   Pain interventions:      Patient education:   Education Given To: Patient;Family  Education Provided: Mobility Training;Transfer Training;Cognition  Education Method: Verbal  Barriers to Learning: Cognition  Education Outcome: Continued education needed     Interdisciplinary Communication: Collaborated w/ PTA regarding pt's progress.       Cognition:   Overall Orientation Status: Impaired  Orientation Level: Oriented to person  Overall Cognitive Status: Exceptions  Arousal/Alertness: Appears intact  Following Commands: Follows one step commands with increased time, Follows one step commands with repetition, Inconsistently follows commands  Attention Span: Impaired  Memory: Impaired  Safety Judgement: Impaired  Problem Solving: Impaired  Insights: Decreased awareness of deficits  Initiation: Requires cues for all  Sequencing: Requires cues for some    Lower Extremity Function:      LLE RLE   ROM WFL WFL   Fine Motor Coordination NT NT   Tone NT NT   Sensation Unable due to cognition Unable due to cognition   Strength Generally decreased, but functional Generally decreased, but functional           Functional Assessment:    Balance  Comments   Static Sitting Good:  Pt. able to maintain balance w/o UE support;  exhibits some postural sway    Dynamic Sitting Fair - accepts minimal challenge;  can maintain balance while turning head/trunk    Static Standing Fair:  Pt. requires UE support;  may need  occasional min A    Dynamic Standing Poor - unable to accept challenge or move without LOB                    BED MOBILITY &TRANSFERS Initial Assessment   Weekly Assessment Comments   Rolling  Rolling to Left: Minimal assistance   Rolling to Right: Minimal assistance     N/a      Supine to Sit Minimal assistance   Partial/Moderate assistance;Minimal assistance   1 person assist, allow extra time to follow verbal commands   Sit to Supine Partial/Moderate assistance, 2 Person assistance   Partial/Moderate assistance      Sit to/from Stand Sit to Stand: Substantial/Maximal assistance, 2 Person Assistance  Stand to Sit: 2 Person Assistance, Substantial/Maximal assistance   Sit to Stand: Minimal Assistance;Partial/Moderate assistance  Stand to Sit: Minimal Assistance;Partial/Moderate assistance   Responds more quickly when given 1-2-3 cues to initiate   Bed To/From Chair Bed to Chair: 2 Person Assistance, Substantial/Maximal assistance, Partial/Moderate assistance  Stand Pivot Transfers: Partial/Moderate assistance, 2 Person Assistance, Substantial/Maximal assistance  Squat Pivot Transfers: Partial/Moderate assistance, Minimal Assistance    Stand Pivot Transfers: Minimal Assistance;Partial/Moderate assistance  Squat Pivot Transfers: Partial/Moderate assistance;Minimal Assistance Assistance level varies, can be distracted and safety decreases   Car Transfer        N/a          WHEELCHAIR MOBILITY/MANAGEMENT Initial Assessment Weekly Assessment Comments   Propulsion Propulsion: Manual  Level: Level Tile  Method: ERASMO ZUNIGA  Level of Assistance: Minimal assistance  Description/ Details: very slow with very minimal hand excursion, cues to continue and keep eyes open  Distance: 40ft   Propulsion: Manual  Level: Level Tile  Method: ALINAE;RUE;RLE;LLE  Level of Assistance: Minimal assistance  Description/ Details: slow pace, constant cues to stay on task  Distance: 80ft      Set-up & Management Propulsion: Yes

## 2025-07-31 NOTE — PROGRESS NOTES
PHYSICAL THERAPY DAILY NOTE      PT Co-Treatment Minutes  Time In: 1044  Time Out: 1118  Minutes: 34               Total Treatment Time:  34 Minutes    Pt. Seen for: AM     Subjective:          Objective: During session patient voiced that he had to go to restroom. Took patient to restroom but he veena not make it in time. He had bowel movement. He required max assist for hygiene and clothes management.  Restrictions/Precautions: Fall Risk, General Precautions, Bed Alarm                        GROSS ASSESSMENT           COGNITION Daily Assessment    Overall Orientation Status: Impaired            BED/MAT MOBILITY Daily Assessment     Supine to Sit: Partial/Moderate assistance  Sit to Supine: Partial/Moderate assistance        TRANSFERS Daily Assessment    Sit to Stand: Partial/Moderate assistance  Stand to Sit: Partial/Moderate assistance  Stand Pivot Transfers: Partial/Moderate assistance;2 Person Assistance          GAIT Daily Assessment                   STEPS/STAIRS Daily Assessment                   BALANCE Daily Assessment    Static Sitting: Poor:  Pt. requires UE support & mod-max A to maintain position  Dynamic Sitting: Poor - unable to accept challenge or move without LOB   Static Standing: Poor:  Pt. requires UE support & mod-max A to maintain position  Dynamic Standing: Poor - unable to accept challenge or move without LOB        WHEELCHAIR MOBILITY Daily Assessment                          LOWER EXTREMITY EXERCISES   Co treated with OT . Worked on transfers with assist of 2. He varies from mod assist to max to min assist. He is not safe with mobility. Stood performing OT activity. He would stand 5 minutes at a time. Walker in front of him but during activity was able to balance with min assist at time. He is unpredictable.     Pain level: no pain noted did voice his big toe still hurts  Pain Location:    Pain Interventions:     Vital Signs:  /84   Pulse 62   Temp 98.1 °F (36.7 °C)   Resp 16   Wt

## 2025-07-31 NOTE — PROGRESS NOTES
End of Shift Note      Acute Onset Mental Status change? No    Does the patient have Inattention? Fluctuates    Does the patient have Disorganized Thinking? Fluctuates    Does the patient have Altered Level of Consciousness? Behavior not present    Is the patient impulsive? Yes    Expression of Ideas and Wants  Does the patient express ideas and wants without difficulty? No, Frequently exhibits difficulty with expressing needs and ideas    Does the patient understand verbal and non-verbal content without cues or repetition? No, Understands only basic conversations or simple, direct phrases or if frequently requires cues to understand    Self-Care  Eating:  Did the patient eat by mouth? Yes; The patient requires minimum help: Supervision for safety, steadying, or contact guard assistance;        Toileting Hygiene:  Did the patient void or have a bowel movement? Voids, Bms, had urgency. Loose Bms as baseline r/t IBS per spouse who is at bedside.     The patient uses a toilet; brief, incontinence some with some urgency.    No intake/output data recorded. No intake/output data recorded.    Telemetry discontinued by Dr Andrade who was at bedside. Merrem given later due to urgency in another room, MD aware and to give after last therapy session per MD, per therapy request.     Safety rounds maintained. Spouse at bedside most of the day      Toilet Transfer:    Did the patient use a standard toilet with or without a raised seat or bedside commode? Yes; The patient requires lifting assistance., The patient requires lowering assistance., The patient requires assistance with one leg., and The patient requires assistance with both legs.None of the activities are affected.    Bladder   Does the patient urinate? Yes  Does the patient have a catheter? No; Is the patient incontinent? No  Does the patient have stress incontinence? No    Bowel  Date of last BM 7/31 x2   Does the patient have an ostomy? No; Is the patient incontinent?

## 2025-07-31 NOTE — CARE COORDINATION
CM reviewed pt chart for continued stay.  LMSW met with pt and spouse.  Provided HH choice list to review and also info about private care options in the area.  Will place HH referral when provider is chosen.  Will continue to follow pt plan of care and assist further with supportive care planning as appropriate.

## 2025-07-31 NOTE — PROGRESS NOTES
[facility] OCCUPATIONAL THERAPY DAILY NOTE  OT Individual Minutes  Time In: 0753  Time Out: 0830  Minutes: 37 OT Co-Treatment Minutes  Time In: 1044  Time Out: 1118  Minutes: 34             Subjective: Pt agreeable to treatment. \"I'm just testing y'all\"  Pain: No pain expressed.  Interdisciplinary Communication: Collaborated with PT, RN, and SLP regarding pt status, pt performance, and co-treatment.   Precautions: Falls, Poor Safety Awareness, Posey Alarm, Cognitive Impairment, and virtual sitter   Lines:IV  O2 Device: RA    FUNCTIONAL MOBILITY:       Bed Mobility Praveen EOB->supine, pt made initial attempt stating pt could complete t/f Mod (I), ending diagonal in bed. Pt then requested assistance, assistance provided on request.   Sit to Stand CGA and Praveen    Transfer  CGA and Praveen  Transfer Type: SPT  Equipment: Grab Bars and RW    Ambulation NT  Equipment: NA       - Activity Tolerance - Balance - Coordination - Energy Conservation/Pacing  - Self-Care   Pt completed self-care to challenge BUE function, functional mobility and (I) with ADLs in preparation for safe return to max (I) with ADLs. Pt tolerated self-care fair with no c/o pain. Rest breaks utilized as needed throughout..  Pt completed:  Toileting; D, incontinent BM initiated in brief, pt unaware of continuation standing at commode with RW, x2 staff assist required, pt able to assist more with clothing management, 5+ verbal cues for directions, safety and pausing hiking of brief while staff continuing to complete hygiene, LB dressing; D for x2, Max A, able to assist with minimal hiking, doff/don brief/pants seated and standing at RW and grab bars  , and Functional t/f training; D for x2 for safety, Min A - CGA sit<>stand and SPT. Pt unsteady at times and frustrated with staff assist or contact for safety. Increased cues and education for safety, see assessment.        - Activity Tolerance - Balance - Cognition - Coordination - Energy Conservation/Pacing    Pt

## 2025-07-31 NOTE — PROGRESS NOTES
Hospitalist Progress Note   Admit Date:  2025 11:33 AM   Name:  Azam May Jr.   Age:  75 y.o.  Sex:  male  :  1950   MRN:  [id number]   Room:  [facility]    Presenting/Chief Complaint: No chief complaint on file.     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 is a 75 y.o. male with history of GERD, hypertension, IBS, insomnia, hypercholesterolemia normal pressure hydrocephalus, stage III kidney disease currently hospitalized for meningitis postprocedure. Patient undergoing physical therapy at inpatient rehab center.  Hospitalist service consulted due to  aggressive behavior.  Of report- patient was being aggressive with staff and had frequent mood swings. Security had to be called to room and he was able to calm down.        Subjective & 24hr Events:   - Patient seen this am. Alert and oriented x3. No agitation inappropriateness seen/reported. No acute distress noted. About to work with PT at this ramesh. Bradycardia seem/reported- will continue to monitor and to consult Cardiology if bradycardia persists.     - Seen patient this am. Speech therapy in room. Reports 2 episodes of diarrhea. Already on probiotics. Bentyl and Imodium ordered. HR seen in the 60s-70s today.. Tele monitoring in place.         Assessment & Plan:     Aggressive behavior  Based on discussions with other care members, patient's sundowning appears consistent with prior episodes.  Currently on IV vancomycin and meropenem for postprocedure meningitis. Afebrile, no leukocytosis - seems to be responding to treatment.     - schedule nightly Seroquel 25mg   - PRN ativan 0.5mg PO for agitation  - Continue high-dose thiamine  - Delirium precautions below     Management of Delirium      Non-pharmacological intervention  Reorient the patient throughout the day  Window open and lights on during the day. Lights off, television off, noises down during the  polyethylene glycol (GLYCOLAX) packet 17 g  17 g Oral Daily PRN    sodium phosphate (FLEET) rectal enema 1 enema  1 enema Rectal Daily PRN    bisacodyl (DULCOLAX) suppository 10 mg  10 mg Rectal Daily PRN    calcium carbonate (TUMS) chewable tablet 500 mg  500 mg Oral TID PRN    hydrALAZINE (APRESOLINE) tablet 10 mg  10 mg Oral TID PRN    ondansetron (ZOFRAN-ODT) disintegrating tablet 4 mg  4 mg Oral Q8H PRN    carboxymethylcellulose (THERATEARS) 1 % ophthalmic gel 1 drop  1 drop Both Eyes TID PRN    Benzocaine-Menthol (CEPACOL SORE THROAT) 15-2.6 MG lozenge 1 lozenge  1 lozenge Oral Q2H PRN    medicated lip ointment (BLISTEX)   Topical PRN    mineral oil-hydrophilic petrolatum (AQUAPHOR) ointment   Topical BID PRN    sodium chloride (OCEAN, BABY AYR) 0.65 % nasal spray 1 spray  1 spray Each Nostril PRN    melatonin tablet 3 mg  3 mg Oral QHS    meropenem (MERREM) 2,000 mg in sodium chloride 0.9 % 100 mL IVPB  2,000 mg IntraVENous Q12H       Signed:  AWA Ahuja NP    Part of this note may have been written by using a voice dictation software.  The note has been proof read but may still contain some grammatical/other typographical errors.

## 2025-07-31 NOTE — PROGRESS NOTES
VANCO DAILY FOLLOW UP NOTE  [facility]   Pharmacy Pharmacokinetic Monitoring Service - Vancomycin    Consulting Provider: Dr. Oneill   Indication: CNS infection  Target Concentration: Goal AUC/JEANNE 400-600 mg*hr/L  EoT: 8/2/2025 per ID  Additional Antimicrobials: meropenem    Pertinent Laboratory Values:   Wt Readings from Last 1 Encounters:   07/29/25 93.4 kg (205 lb 12.8 oz)     Temp Readings from Last 1 Encounters:   07/31/25 98.1 °F (36.7 °C)     Recent Labs     07/31/25  0306   BUN 16   CREATININE 1.44*   WBC 4.8     Estimated Creatinine Clearance: 52 mL/min (A) (based on SCr of 1.44 mg/dL (H)).    Lab Results   Component Value Date/Time    VANCORANDOM 20.6 07/30/2025 04:46 AM       MRSA Nasal Swab: N/A. Non-respiratory infection    Assessment:  Date/Time Dose Concentration AUC   7/20 0652 1250 mg q24h 10.4 488   7/22 0628 1250 mg q24h 11.4 379   7/25 0319 1250 mg q18h 22 621   7/27 0530 1500 mg q24h 24 581   7/30 0446 1250 mg q24h 20.6 486   Note: Serum concentrations collected for AUC dosing may appear elevated if collected in close proximity to the dose administered, this is not necessarily an indication of toxicity    Plan:  Dosing recommendations based on Bayesian software   Continue vancomycin 1250 mg IV q24h.   Repeat vancomycin concentrations will be ordered as clinically appropriate   Pharmacy will continue to monitor patient and adjust therapy as indicated    Thank you for the consult,  Seymour Onofre, PharmD, BCOP  Clinical Pharmacist  Contact [employer]

## 2025-08-01 LAB
ANION GAP SERPL CALC-SCNC: 13 MMOL/L (ref 7–16)
BASOPHILS # BLD: 0.1 K/UL (ref 0–0.2)
BASOPHILS NFR BLD: 2 % (ref 0–2)
BUN SERPL-MCNC: 17 MG/DL (ref 8–23)
CALCIUM SERPL-MCNC: 8.8 MG/DL (ref 8.8–10.2)
CHLORIDE SERPL-SCNC: 106 MMOL/L (ref 98–107)
CO2 SERPL-SCNC: 20 MMOL/L (ref 20–29)
CREAT SERPL-MCNC: 1.51 MG/DL (ref 0.8–1.3)
DIFFERENTIAL METHOD BLD: ABNORMAL
EOSINOPHIL # BLD: 0.21 K/UL (ref 0–0.8)
EOSINOPHIL NFR BLD: 4.1 % (ref 0.5–7.8)
ERYTHROCYTE [DISTWIDTH] IN BLOOD BY AUTOMATED COUNT: 13.9 % (ref 11.9–14.6)
GLUCOSE SERPL-MCNC: 93 MG/DL (ref 70–99)
HCT VFR BLD AUTO: 45.1 % (ref 41.1–50.3)
HGB BLD-MCNC: 14.5 G/DL (ref 13.6–17.2)
IMM GRANULOCYTES # BLD AUTO: 0.14 K/UL (ref 0–0.5)
IMM GRANULOCYTES NFR BLD AUTO: 2.7 % (ref 0–5)
LYMPHOCYTES # BLD: 0.43 K/UL (ref 0.5–4.6)
LYMPHOCYTES NFR BLD: 8.4 % (ref 13–44)
MCH RBC QN AUTO: 31 PG (ref 26.1–32.9)
MCHC RBC AUTO-ENTMCNC: 32.2 G/DL (ref 31.4–35)
MCV RBC AUTO: 96.4 FL (ref 82–102)
MONOCYTES # BLD: 0.25 K/UL (ref 0.1–1.3)
MONOCYTES NFR BLD: 4.9 % (ref 4–12)
NEUTS SEG # BLD: 3.97 K/UL (ref 1.7–8.2)
NEUTS SEG NFR BLD: 77.9 % (ref 43–78)
NRBC # BLD: 0 K/UL (ref 0–0.2)
PLATELET # BLD AUTO: 186 K/UL (ref 150–450)
PMV BLD AUTO: 10 FL (ref 9.4–12.3)
POTASSIUM SERPL-SCNC: 4.4 MMOL/L (ref 3.5–5.1)
RBC # BLD AUTO: 4.68 M/UL (ref 4.23–5.6)
SODIUM SERPL-SCNC: 140 MMOL/L (ref 136–145)
WBC # BLD AUTO: 5.1 K/UL (ref 4.3–11.1)

## 2025-08-01 PROCEDURE — 36415 COLL VENOUS BLD VENIPUNCTURE: CPT

## 2025-08-01 PROCEDURE — 6370000000 HC RX 637 (ALT 250 FOR IP): Performed by: STUDENT IN AN ORGANIZED HEALTH CARE EDUCATION/TRAINING PROGRAM

## 2025-08-01 PROCEDURE — 97130 THER IVNTJ EA ADDL 15 MIN: CPT

## 2025-08-01 PROCEDURE — 6360000002 HC RX W HCPCS: Performed by: STUDENT IN AN ORGANIZED HEALTH CARE EDUCATION/TRAINING PROGRAM

## 2025-08-01 PROCEDURE — 85025 COMPLETE CBC W/AUTO DIFF WBC: CPT

## 2025-08-01 PROCEDURE — APPSS15 APP SPLIT SHARED TIME 0-15 MINUTES: Performed by: PHYSICIAN ASSISTANT

## 2025-08-01 PROCEDURE — 80048 BASIC METABOLIC PNL TOTAL CA: CPT

## 2025-08-01 PROCEDURE — 6360000002 HC RX W HCPCS: Performed by: NURSE PRACTITIONER

## 2025-08-01 PROCEDURE — 2580000003 HC RX 258: Performed by: STUDENT IN AN ORGANIZED HEALTH CARE EDUCATION/TRAINING PROGRAM

## 2025-08-01 PROCEDURE — 1100000003 HC PRIVATE W/ TELEMETRY

## 2025-08-01 PROCEDURE — 97129 THER IVNTJ 1ST 15 MIN: CPT

## 2025-08-01 PROCEDURE — 6370000000 HC RX 637 (ALT 250 FOR IP): Performed by: NURSE PRACTITIONER

## 2025-08-01 PROCEDURE — 97535 SELF CARE MNGMENT TRAINING: CPT

## 2025-08-01 PROCEDURE — 2500000003 HC RX 250 WO HCPCS: Performed by: STUDENT IN AN ORGANIZED HEALTH CARE EDUCATION/TRAINING PROGRAM

## 2025-08-01 PROCEDURE — 2580000003 HC RX 258: Performed by: NURSE PRACTITIONER

## 2025-08-01 RX ORDER — DIVALPROEX SODIUM 125 MG/1
125 CAPSULE, COATED PELLETS ORAL EVERY 12 HOURS SCHEDULED
Status: DISPENSED | OUTPATIENT
Start: 2025-08-01

## 2025-08-01 RX ADMIN — Medication 1 CAPSULE: at 10:06

## 2025-08-01 RX ADMIN — LORAZEPAM 0.5 MG: 1 TABLET ORAL at 10:06

## 2025-08-01 RX ADMIN — MEROPENEM 2000 MG: 1 INJECTION INTRAVENOUS at 23:51

## 2025-08-01 RX ADMIN — SODIUM CHLORIDE, PRESERVATIVE FREE 10 ML: 5 INJECTION INTRAVENOUS at 10:15

## 2025-08-01 RX ADMIN — VANCOMYCIN HYDROCHLORIDE 1250 MG: 10 INJECTION, POWDER, LYOPHILIZED, FOR SOLUTION INTRAVENOUS at 19:16

## 2025-08-01 RX ADMIN — DIVALPROEX SODIUM 125 MG: 125 CAPSULE ORAL at 20:50

## 2025-08-01 RX ADMIN — Medication 3 MG: at 20:50

## 2025-08-01 RX ADMIN — MEROPENEM 2000 MG: 1 INJECTION INTRAVENOUS at 10:15

## 2025-08-01 RX ADMIN — LOPERAMIDE HYDROCHLORIDE 2 MG: 2 CAPSULE ORAL at 10:06

## 2025-08-01 RX ADMIN — QUETIAPINE FUMARATE 25 MG: 25 TABLET ORAL at 20:50

## 2025-08-01 RX ADMIN — SODIUM CHLORIDE, PRESERVATIVE FREE 10 ML: 5 INJECTION INTRAVENOUS at 20:53

## 2025-08-01 RX ADMIN — DONEPEZIL HYDROCHLORIDE 5 MG: 5 TABLET, FILM COATED ORAL at 20:50

## 2025-08-01 NOTE — PROGRESS NOTES
[place]patient [place] [address]  Tel: [phone]     Physical Medicine and Rehabilitation Progress Note      Azam Perezheidy Glez  Admit Date: 7/25/2025  Admit Diagnosis: Meningitis after procedure [G97.82, G03.8]    Subjective     Patient seen this morning during break in therapy. Noted by staff today to be increasingly irritable and agitated. Code KIERRA called mid-morning for patient's verbal threats; we will initiate Depakote sprinkle 125mg BID and request wife / family be present for all therapies. All questions and concerns were addressed at this time.    Objective:     Current Facility-Administered Medications   Medication Dose Route Frequency    divalproex (DEPAKOTE SPRINKLE) DR capsule 125 mg  125 mg Oral 2 times per day    dicyclomine (BENTYL) tablet 20 mg  20 mg Oral 4x Daily PRN    loperamide (IMODIUM) capsule 2 mg  2 mg Oral 4x Daily PRN    lactobacillus (CULTURELLE) capsule 1 capsule  1 capsule Oral Daily with breakfast    QUEtiapine (SEROQUEL) tablet 25 mg  25 mg Oral Nightly    donepezil (ARICEPT) tablet 5 mg  5 mg Oral Nightly    LORazepam (ATIVAN) tablet 0.5 mg  0.5 mg Oral BID PRN    vancomycin (VANCOCIN) 1250 mg in sodium chloride 0.9% 250 mL IVPB  1,250 mg IntraVENous Q24H    cyanocobalamin injection 1,000 mcg  1,000 mcg IntraMUSCular Weekly    Followed by    [START ON 8/27/2025] cyanocobalamin injection 1,000 mcg  1,000 mcg IntraMUSCular Q30 Days    sodium chloride flush 0.9 % injection 5-40 mL  5-40 mL IntraVENous 2 times per day    sodium chloride flush 0.9 % injection 5-40 mL  5-40 mL IntraVENous PRN    acetaminophen (TYLENOL) tablet 650 mg  650 mg Oral Q4H PRN    polyethylene glycol (GLYCOLAX) packet 17 g  17 g Oral Daily PRN    sodium phosphate (FLEET) rectal enema 1 enema  1 enema Rectal Daily PRN    bisacodyl (DULCOLAX) suppository 10 mg  10 mg Rectal Daily PRN    calcium carbonate (TUMS) chewable tablet 500 mg  500 mg Oral TID PRN     Intensive therapies indicated for safe transit to the home environment.  - Patient and family/caregivers also require education in post-hospital precautions and home exercise routine, adaptive techniques and deficit compensation strategies, strengthening and conditioning, equipment prescription and instructions in use.  to be assigned to patient's care to help facilitate all disposition needs and equipment requests provided by treating team members.  - Cognition- Patient has the potential for residual deficits in orientation, processing, attention, thought organization, problem solving, reasoning, word retrieval, and memory given recent encephalopathy. Therefore, requires continued routine follow up primarily by Speech Language Pathology in addition to Occupational Therapy to address potential underlying deficits and working on higher level-cognitive function with compensatory strategies as indicated.  - RT-maintain O2 saturations greater than 92% during physical and endurance activities. Will consult RT if concerns with maintaining O2 saturations  - Diet- ADULT DIET; Regular  ADULT ORAL NUTRITION SUPPLEMENT; HS Snack; Snack; as needed night time snack  - DME: TBD  - Family Training: TBD  - Dispo: Home with home health services                 Active medical conditions requiring ongoing and daily monitoring:  //Right lower extremity edema  -Doppler ultrasound 7/24 ruled out DVT    //CKD 3  -Creatinine 1.73 on 7/28  -Creatinine improved to 1.4 on 7/31  - This appears baseline  -Monitor BMP during rehab stay  -Continue to encourage fluid hydration     //Irregular heartbeat  -EKG obtained 7/25, independently reviewed by myself  -QTc within normal limits  -PVCs noted, normal sinus rhythm  -Depending on lability of heart rate, may benefit from beta-blocker  -Continue monitoring during rehab stay  -7/30 repeat ECG again with sinus rhythm, frequent PVCs, no significant change from ECG 7/28; QTc

## 2025-08-01 NOTE — PROGRESS NOTES
End of Shift Note      Acute Onset Mental Status change? No    Does the patient have Inattention? Behavior not present    Does the patient have Disorganized Thinking? Behavior not present    Does the patient have Altered Level of Consciousness? Behavior not present    Is the patient impulsive? Yes    Expression of Ideas and Wants  Does the patient express ideas and wants without difficulty? Yes    Does the patient understand verbal and non-verbal content without cues or repetition? Yes    Self-Care  Eating:  Did the patient eat by mouth? Yes; No assistance required.    Oral Hygiene:  Did the patient use suitable items to clean teeth or gums? Yes; The patient requires minimum help: Supervision for safety, steadying, or contact guard assistance;    Toileting Hygiene:  Did the patient void or have a bowel movement? Yes; The patient requires assistance with pulling pants up;    The patient uses a toilet;     No intake/output data recorded. No intake/output data recorded.    Shower/Bathe Self:  Did the patient have a shower or bath? No; The activity did not occur because hs.    Upper Body Dressing:  Did the patient dress or undress above the waist? No; The activity did not occur because hs.    Lower Body Dressing:  Did the patient dress or undress below the waist? No; The activity did not occur because hs.    Mobility  Chair/bed-to-chair Transfer:  Did the patient transfer to and from a bed to a chair or wheelchair? Yes; Requires supervision for safety, steadying, or contact guard assistance.    Toilet Transfer:    Did the patient use a standard toilet with or without a raised seat or bedside commode? Yes; Requires supervision for safety, steadying, or contact guard assistance.Sleep; Occasionally    Bladder   Does the patient urinate? Yes  Does the patient have a catheter? No; Is the patient incontinent? No  Does the patient have stress incontinence? No    Bowel  Date of last BM 07/31/2025   Does the patient have an

## 2025-08-01 NOTE — PROGRESS NOTES
Physical Therapy    Patient on hold for PM treatment per MD orders. MD would like to have wife present during treatment sessions to see if her presence would help patient participate in treatment sessions, decrease verbally fighting with therapists, and threatening therapy staff.     Jax Moore PTA, ATC, Med.

## 2025-08-01 NOTE — PROGRESS NOTES
Hospitalist Progress Note   Admit Date:  2025 11:33 AM   Name:  Azam May Jr.   Age:  75 y.o.  Sex:  male  :  1950   MRN:  [id number]   Room:  [facility]    Presenting/Chief Complaint: No chief complaint on file.     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 is a 75 y.o. male with history of GERD, hypertension, IBS, insomnia, hypercholesterolemia normal pressure hydrocephalus, stage III kidney disease currently hospitalized for meningitis postprocedure. Patient undergoing physical therapy at inpatient rehab center.  Hospitalist service consulted due to  aggressive behavior.  Of report- patient was being aggressive with staff and had frequent mood swings. Security had to be called to room and he was able to calm down.        Subjective & 24hr Events:   Code darryn was called around 0850. Went up to see patient. Patient's nurse and OT outside of room. Physical medicine PA also outside room. OT stated patient had been threatening to hit and even swung his arm making contact. When I went into room patient was sitting on bed. Security and PT in room. PT assisted patient up to go to the bathroom. Spoke to nurse and and stated for nurse to give the prn oral Ativan ordered. Will continue to monitor and follow.       Assessment & Plan:     Aggressive behavior  Based on discussions with other care members, patient's sundowning appears consistent with prior episodes.  Currently on IV vancomycin and meropenem for postprocedure meningitis. Afebrile, no leukocytosis - seems to be responding to treatment.     - schedule nightly Seroquel 25mg   - PRN ativan 0.5mg PO for agitation  - Continue high-dose thiamine  - Delirium precautions below     Management of Delirium      Non-pharmacological intervention  Reorient the patient throughout the day  Window open and lights on during the day. Lights off, television off, noises down during the

## 2025-08-01 NOTE — PROGRESS NOTES
Physical Therapy    Attempted to see patient this AM for physical therapy and co-treat with occupational therapy due today's treatment session.  Patient supine on entry. Physical therapy performed introductions and attempted to start treatment session. Patient began to argue and complain to PT that \"he knows what to do to get better\" and refusing participation. Attempted gentle cues, encouragement, education and various options for session focus/tasks. Patient continued refusal, rejecting and disputing all education and encouragement offered by PT.  Pt turned to OT and stated \"he's just a tough priscilla, a smart ###\". OT did not respond or engage pt's comments. PT attempted redirection, pt unwilling to redirect and reiterated refusal for participation. Following treatment attempts, PT and OT presented to MD office and informed MD of Code KIERRA during initial session, subsequent interaction and most recent attempt and pt response. MD acknowledge and advised pt spouse is to be present for all therapy sessions, If pt spouse not present not to attempt therapy. MD acknowledged and advised she would contact spouse and discuss spouse being present during therapy sessions. Will follow and re-attempt as spouse present and pt agreeable.   Jax Moore, PTA, ATC, Med.

## 2025-08-01 NOTE — PROGRESS NOTES
Dr. Oneill informed pt sleeping after receiving Ativan this am. Per Dr. Oneill, daria to hold this dose of Depakote Sprinkles.

## 2025-08-01 NOTE — PROGRESS NOTES
VANCO DAILY FOLLOW UP NOTE  [facility]   Pharmacy Pharmacokinetic Monitoring Service - Vancomycin    Consulting Provider: Dr. Oneill   Indication: CNS infection  Target Concentration: Goal AUC/JEANNE 400-600 mg*hr/L  EoT: 8/2/2025 per ID  Additional Antimicrobials: meropenem    Pertinent Laboratory Values:   Wt Readings from Last 1 Encounters:   07/29/25 93.4 kg (205 lb 12.8 oz)     Temp Readings from Last 1 Encounters:   08/01/25 98.2 °F (36.8 °C) (Oral)     Recent Labs     07/31/25  0306 08/01/25  0756   BUN 16 17   CREATININE 1.44* 1.51*   WBC 4.8 5.1     Estimated Creatinine Clearance: 50 mL/min (A) (based on SCr of 1.51 mg/dL (H)).    Lab Results   Component Value Date/Time    VANCORANDOM 20.6 07/30/2025 04:46 AM       MRSA Nasal Swab: N/A. Non-respiratory infection    Assessment:  Date/Time Dose Concentration AUC   7/20 0652 1250 mg q24h 10.4 488   7/22 0628 1250 mg q24h 11.4 379   7/25 0319 1250 mg q18h 22 621   7/27 0530 1500 mg q24h 24 581   7/30 0446 1250 mg q24h 20.6 486   Note: Serum concentrations collected for AUC dosing may appear elevated if collected in close proximity to the dose administered, this is not necessarily an indication of toxicity    Plan:  Dosing recommendations based on Bayesian software   Continue vancomycin 1250 mg IV q24h.   Repeat vancomycin concentrations will be ordered as clinically appropriate   Pharmacy will continue to monitor patient and adjust therapy as indicated    Thank you for the consult,  Jaime Fox [facility]

## 2025-08-01 NOTE — PROGRESS NOTES
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 cognition actually worsening with no improvements in insight. He benefits from suggestions rather than direct cueing, 1 \"suggestion\" at a time and allow patient time before giving another direction to help reduce agitiation.      GENERAL   Subjective:  \"oh yall are trying to help me\" \"I'm sick of hearing that\".   Patient with various inappropriate and disrespectful comments.     Pain:   Patient c/o pain (stomach- RN notified)                       OBJECTIVE   Cognitive linguistic tx-  Co-Treatment with OT necessary and utilized due to patient's decreased tolerance levels related to poor insight in order to maximize patient rehab potential, increase participation, and further patient's progress.     Sequencing: impaired, increased time, verbal cues. allowed increased time between giving additional verbal cues to reduce frustration.   Problem solving: impaired, decreased safety awareness.   Insight: Impaired. Offered assistance, pt denying need for help.   Patient benefits from gentle suggestions as cues rather than direct cues.       During individual session- focus on increasing insight and participation via open ended questions and yes/no questions.   Patient reports reasoning for rehab stay as \"I was sick\". Poor reasoning

## 2025-08-01 NOTE — PROGRESS NOTES
Pt refused to allow lab draws this morning.  States he wants to wait until he is up.  Has been sleeping through most of the night, sitter and virtual sitter at bedside.

## 2025-08-01 NOTE — PROGRESS NOTES
[facility] OCCUPATIONAL THERAPY DAILY NOTE  OT Individual Minutes  Time In: 0836  Time Out: 0910  Minutes: 34 OT Co-Treatment Minutes  Time In: 0910  Time Out: 0925  Minutes: 15             Subjective: Pt with increased agitation and limitied agreeableness present session. \"I don't make threats I make action\"  Pain: No pain expressed.  Interdisciplinary Communication: Collaborated with PA, PT, RN, SLP, and security time, hospitalist PA regarding pt status, pt performance, handoff communication, and Code KIERRA.   Precautions: Falls, Impulsive, Poor Safety Awareness, Posey Alarm, Cognitive Impairment, and rapid fluctuations in demeanor with intermittent threats to staff  Lines:IV  O2 Device: RA    FUNCTIONAL MOBILITY:       Bed Mobility NT    Sit to Stand CGA and Praveen    Transfer  CGA and Praveen  Transfer Type: SPT  Equipment: Grab Bars and RW    Ambulation CGA and Praveen  Equipment: RW       - Activity Tolerance - Balance - Cognition - Coordination - Energy Conservation/Pacing  - Self-Care   Pt completed self-care to challenge BUE function, functional mobility and (I) with ADLs in preparation for safe return to max (I) with ADLs. Pt tolerated self-care poorly with no c/o pain. Rest breaks utilized as needed throughout..  Pt completed:  Toileting; D, multiple bouts of continent/incontinent BM into brief, pt at times providing verbal report of sensation of need to go before becoming argumentative about t/f to commode. BM into brief and seated and standing at [facility] over commode with RW and grab bars, 2-3 staff for assist/safety, D for hygiene, Bathing; D for post BM UB/LB bathing as needed seated/standing at [facility], UB dressing; Sup-V doff/don srub top seated x 2 overall, LB dressing; Max-Mod A doff soiled clothes, Min A don brief and pants seated and standing with significantly increased time and cues x2 overall, decreased initiation and sequencing noted , Footwear; SBA doff/don slip-on shoes, and Functional t/f training; Attempted
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
## 2025-08-01 NOTE — PLAN OF CARE
Problem: Pain  Goal: Verbalizes/displays adequate comfort level or baseline comfort level  8/1/2025 1312 by Stella Eaton, RN  Outcome: Progressing  8/1/2025 0452 by Irish Mejía, RN  Outcome: Progressing

## 2025-08-01 NOTE — PROGRESS NOTES
Pt asleep at present after receiving Ativan 0.5mg po this am. Pt has Depakote Sprinkles ordered, will hold at present due to pt sleeping.

## 2025-08-02 LAB
ANION GAP SERPL CALC-SCNC: 11 MMOL/L (ref 7–16)
BASOPHILS # BLD: 0.08 K/UL (ref 0–0.2)
BASOPHILS NFR BLD: 1.7 % (ref 0–2)
BUN SERPL-MCNC: 18 MG/DL (ref 8–23)
CALCIUM SERPL-MCNC: 8.8 MG/DL (ref 8.8–10.2)
CHLORIDE SERPL-SCNC: 107 MMOL/L (ref 98–107)
CO2 SERPL-SCNC: 23 MMOL/L (ref 20–29)
CREAT SERPL-MCNC: 1.4 MG/DL (ref 0.8–1.3)
DIFFERENTIAL METHOD BLD: ABNORMAL
EOSINOPHIL # BLD: 0.23 K/UL (ref 0–0.8)
EOSINOPHIL NFR BLD: 5 % (ref 0.5–7.8)
ERYTHROCYTE [DISTWIDTH] IN BLOOD BY AUTOMATED COUNT: 14 % (ref 11.9–14.6)
GLUCOSE SERPL-MCNC: 90 MG/DL (ref 70–99)
HCT VFR BLD AUTO: 43.8 % (ref 41.1–50.3)
HGB BLD-MCNC: 14.2 G/DL (ref 13.6–17.2)
IMM GRANULOCYTES # BLD AUTO: 0.12 K/UL (ref 0–0.5)
IMM GRANULOCYTES NFR BLD AUTO: 2.6 % (ref 0–5)
LYMPHOCYTES # BLD: 0.45 K/UL (ref 0.5–4.6)
LYMPHOCYTES NFR BLD: 9.8 % (ref 13–44)
MCH RBC QN AUTO: 31.1 PG (ref 26.1–32.9)
MCHC RBC AUTO-ENTMCNC: 32.4 G/DL (ref 31.4–35)
MCV RBC AUTO: 96.1 FL (ref 82–102)
MONOCYTES # BLD: 0.24 K/UL (ref 0.1–1.3)
MONOCYTES NFR BLD: 5.2 % (ref 4–12)
NEUTS SEG # BLD: 3.47 K/UL (ref 1.7–8.2)
NEUTS SEG NFR BLD: 75.7 % (ref 43–78)
NRBC # BLD: 0 K/UL (ref 0–0.2)
PLATELET # BLD AUTO: 181 K/UL (ref 150–450)
PMV BLD AUTO: 10.5 FL (ref 9.4–12.3)
POTASSIUM SERPL-SCNC: 4.3 MMOL/L (ref 3.5–5.1)
RBC # BLD AUTO: 4.56 M/UL (ref 4.23–5.6)
SODIUM SERPL-SCNC: 141 MMOL/L (ref 136–145)
WBC # BLD AUTO: 4.6 K/UL (ref 4.3–11.1)

## 2025-08-02 PROCEDURE — 6370000000 HC RX 637 (ALT 250 FOR IP): Performed by: STUDENT IN AN ORGANIZED HEALTH CARE EDUCATION/TRAINING PROGRAM

## 2025-08-02 PROCEDURE — 85025 COMPLETE CBC W/AUTO DIFF WBC: CPT

## 2025-08-02 PROCEDURE — 2500000003 HC RX 250 WO HCPCS: Performed by: STUDENT IN AN ORGANIZED HEALTH CARE EDUCATION/TRAINING PROGRAM

## 2025-08-02 PROCEDURE — 2580000003 HC RX 258: Performed by: NURSE PRACTITIONER

## 2025-08-02 PROCEDURE — 80048 BASIC METABOLIC PNL TOTAL CA: CPT

## 2025-08-02 PROCEDURE — 36415 COLL VENOUS BLD VENIPUNCTURE: CPT

## 2025-08-02 PROCEDURE — 6360000002 HC RX W HCPCS: Performed by: STUDENT IN AN ORGANIZED HEALTH CARE EDUCATION/TRAINING PROGRAM

## 2025-08-02 PROCEDURE — 1100000003 HC PRIVATE W/ TELEMETRY

## 2025-08-02 PROCEDURE — 2580000003 HC RX 258: Performed by: STUDENT IN AN ORGANIZED HEALTH CARE EDUCATION/TRAINING PROGRAM

## 2025-08-02 PROCEDURE — 6360000002 HC RX W HCPCS: Performed by: NURSE PRACTITIONER

## 2025-08-02 PROCEDURE — 87493 C DIFF AMPLIFIED PROBE: CPT

## 2025-08-02 RX ADMIN — Medication 1 CAPSULE: at 08:49

## 2025-08-02 RX ADMIN — DIVALPROEX SODIUM 125 MG: 125 CAPSULE ORAL at 08:49

## 2025-08-02 RX ADMIN — VANCOMYCIN HYDROCHLORIDE 1250 MG: 10 INJECTION, POWDER, LYOPHILIZED, FOR SOLUTION INTRAVENOUS at 18:32

## 2025-08-02 RX ADMIN — Medication 3 MG: at 21:08

## 2025-08-02 RX ADMIN — SODIUM CHLORIDE, PRESERVATIVE FREE 10 ML: 5 INJECTION INTRAVENOUS at 21:09

## 2025-08-02 RX ADMIN — DIVALPROEX SODIUM 125 MG: 125 CAPSULE ORAL at 21:08

## 2025-08-02 RX ADMIN — SODIUM CHLORIDE, PRESERVATIVE FREE 10 ML: 5 INJECTION INTRAVENOUS at 09:11

## 2025-08-02 RX ADMIN — QUETIAPINE FUMARATE 25 MG: 25 TABLET ORAL at 21:08

## 2025-08-02 RX ADMIN — MEROPENEM 2000 MG: 1 INJECTION INTRAVENOUS at 23:54

## 2025-08-02 RX ADMIN — MEROPENEM 2000 MG: 1 INJECTION INTRAVENOUS at 11:31

## 2025-08-02 RX ADMIN — DONEPEZIL HYDROCHLORIDE 5 MG: 5 TABLET, FILM COATED ORAL at 21:08

## 2025-08-02 NOTE — PROGRESS NOTES
VANCO DAILY FOLLOW UP NOTE  [facility]   Pharmacy Pharmacokinetic Monitoring Service - Vancomycin    Consulting Provider: Dr. Oneill   Indication: CNS infection  Target Concentration: Goal AUC/JEANNE 400-600 mg*hr/L  EoT: 8/2/2025 per ID  Additional Antimicrobials: meropenem    Pertinent Laboratory Values:   Wt Readings from Last 1 Encounters:   07/29/25 93.4 kg (205 lb 12.8 oz)     Temp Readings from Last 1 Encounters:   08/02/25 97.5 °F (36.4 °C) (Oral)     Recent Labs     07/31/25  0306 08/01/25  0756 08/02/25  0628   BUN 16 17 18   CREATININE 1.44* 1.51* 1.40*   WBC 4.8 5.1 4.6     Estimated Creatinine Clearance: 54 mL/min (A) (based on SCr of 1.4 mg/dL (H)).    Lab Results   Component Value Date/Time    VANCORANDOM 20.6 07/30/2025 04:46 AM       MRSA Nasal Swab: N/A. Non-respiratory infection    Assessment:  Date/Time Dose Concentration AUC   7/20 0652 1250 mg q24h 10.4 488   7/22 0628 1250 mg q24h 11.4 379   7/25 0319 1250 mg q18h 22 621   7/27 0530 1500 mg q24h 24 581   7/30 0446 1250 mg q24h 20.6 486   Note: Serum concentrations collected for AUC dosing may appear elevated if collected in close proximity to the dose administered, this is not necessarily an indication of toxicity    Plan:  Dosing recommendations based on Bayesian software   Continue vancomycin 1250 mg every 24 hours, last dose this evening  Repeat vancomycin concentrations will be ordered as clinically appropriate   Pharmacy will continue to monitor patient and adjust therapy as indicated    Thank you for the consult,  Chitra Salomon [facility]

## 2025-08-02 NOTE — PROGRESS NOTES
OT NOTE:   12:59    Attempted to see patient for OT session with PT present as well. Patient refused despite multiple attempts from OT, PT, and wife to encourage patient to participate. Continue OT POC as able. Patient was left long sitting in bed with wife and sitter present.     Suzette Leonard, OTR/L  8/2/2025

## 2025-08-02 NOTE — PROGRESS NOTES
Hospitalist Progress Note   Admit Date:  2025 11:33 AM   Name:  Azam May Jr.   Age:  75 y.o.  Sex:  male  :  1950   MRN:  [id number]   Room:  [facility]/    Presenting/Chief Complaint: No chief complaint on file.     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 is a 75 y.o. male with medical history of post procedure meningitis, NPH, encephalopathy, CKD, GERD, hx prostate cancer transferred to [facility] for ongoing therapy and became agitated, CODE KIERRA called and Hospitalist consulted for assistance.  After starting seroquel qhs and prn ativan, mood improved.    Subjective & 24hr Events:   \"I feel fine.\"  Calm 75y.o. WM laying in bed in NAD    No complaints this AM.      Assessment & Plan:     Principal Problem:    Agitation  - improved / resolved; pt is not confused  - cont seroquel qhs and prn ativan    Active Problems:    Post procedural meningitis  - vanco / merrem to complete next 24hrs  - VSS, no leukocytosis      CKD   - renal fxn stable    Dispo: Hospitalist service signing off, please recall as needed    Anticipated Discharge Arrangements:   Defer to primary team    PT/OT evals ordered?  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  Active Problems:    Essential hypertension    GERD (gastroesophageal reflux disease)    BMI 29.0-29.9,adult    Septic encephalopathy    Stage 3a chronic kidney disease (CKD) (HCC)    Gait abnormality    Decreased activities of daily living (ADL)    Cognitive deficits    Impulsive    Dysesthesia affecting both sides of body    Acute gout involving toe of right  - 5.0 %    Neutrophils Absolute 3.97 1.70 - 8.20 K/UL    Lymphocytes Absolute 0.43 (L) 0.50 - 4.60 K/UL    Monocytes Absolute 0.25 0.10 - 1.30 K/UL    Eosinophils Absolute 0.21 0.00 - 0.80 K/UL    Basophils Absolute 0.10 0.00 - 0.20 K/UL    Immature Granulocytes Absolute 0.14 0.0 - 0.5 K/UL   Basic Metabolic Panel w/ Reflex to MG    Collection Time: 08/01/25  7:56 AM   Result Value Ref Range    Sodium 140 136 - 145 mmol/L    Potassium 4.4 3.5 - 5.1 mmol/L    Chloride 106 98 - 107 mmol/L    CO2 20 20 - 29 mmol/L    Anion Gap 13 7 - 16 mmol/L    Glucose 93 70 - 99 mg/dL    BUN 17 8 - 23 MG/DL    Creatinine 1.51 (H) 0.80 - 1.30 MG/DL    Est, Glom Filt Rate 48 (L) >60 ml/min/1.73m2    Calcium 8.8 8.8 - 10.2 MG/DL   CBC with Auto Differential    Collection Time: 08/02/25  6:28 AM   Result Value Ref Range    WBC 4.6 4.3 - 11.1 K/uL    RBC 4.56 4.23 - 5.6 M/uL    Hemoglobin 14.2 13.6 - 17.2 g/dL    Hematocrit 43.8 41.1 - 50.3 %    MCV 96.1 82 - 102 FL    MCH 31.1 26.1 - 32.9 PG    MCHC 32.4 31.4 - 35.0 g/dL    RDW 14.0 11.9 - 14.6 %    Platelets 181 150 - 450 K/uL    MPV 10.5 9.4 - 12.3 FL    nRBC 0.00 0.0 - 0.2 K/uL    Differential Type AUTOMATED      Neutrophils % 75.7 43.0 - 78.0 %    Lymphocytes % 9.8 (L) 13.0 - 44.0 %    Monocytes % 5.2 4.0 - 12.0 %    Eosinophils % 5.0 0.5 - 7.8 %    Basophils % 1.7 0.0 - 2.0 %    Immature Granulocytes % 2.6 0.0 - 5.0 %    Neutrophils Absolute 3.47 1.70 - 8.20 K/UL    Lymphocytes Absolute 0.45 (L) 0.50 - 4.60 K/UL    Monocytes Absolute 0.24 0.10 - 1.30 K/UL    Eosinophils Absolute 0.23 0.00 - 0.80 K/UL    Basophils Absolute 0.08 0.00 - 0.20 K/UL    Immature Granulocytes Absolute 0.12 0.0 - 0.5 K/UL   Basic Metabolic Panel w/ Reflex to MG    Collection Time: 08/02/25  6:28 AM   Result Value Ref Range    Sodium 141 136 - 145 mmol/L    Potassium 4.3 3.5 - 5.1 mmol/L    Chloride 107 98 - 107 mmol/L    CO2 23 20 - 29 mmol/L    Anion Gap 11 7 - 16 mmol/L    Glucose 90 70 - 99 mg/dL    BUN

## 2025-08-02 NOTE — PROGRESS NOTES
Physical Therapy  Entered pt room with OT to attempt therapy session with pt. Spouse and sitter present. Pt refused PT and OT upon encouragement and education.   Tried x2 to see pt for PT with pt refusing both times.    Sushma Webber, PTA

## 2025-08-03 ENCOUNTER — APPOINTMENT (OUTPATIENT)
Dept: CT IMAGING | Age: 75
DRG: 091 | End: 2025-08-03
Attending: STUDENT IN AN ORGANIZED HEALTH CARE EDUCATION/TRAINING PROGRAM
Payer: MEDICARE

## 2025-08-03 VITALS
BODY MASS INDEX: 29.53 KG/M2 | OXYGEN SATURATION: 96 % | SYSTOLIC BLOOD PRESSURE: 121 MMHG | HEART RATE: 73 BPM | WEIGHT: 205.8 LBS | DIASTOLIC BLOOD PRESSURE: 76 MMHG | TEMPERATURE: 98.2 F | RESPIRATION RATE: 17 BRPM

## 2025-08-03 LAB
C. DIFFICILE TOXIN MOLECULAR: NEGATIVE
GLUCOSE BLD STRIP.AUTO-MCNC: 124 MG/DL (ref 65–100)
SERVICE CMNT-IMP: ABNORMAL

## 2025-08-03 PROCEDURE — 6370000000 HC RX 637 (ALT 250 FOR IP): Performed by: STUDENT IN AN ORGANIZED HEALTH CARE EDUCATION/TRAINING PROGRAM

## 2025-08-03 PROCEDURE — 82962 GLUCOSE BLOOD TEST: CPT

## 2025-08-03 PROCEDURE — 1100000003 HC PRIVATE W/ TELEMETRY

## 2025-08-03 PROCEDURE — 2500000003 HC RX 250 WO HCPCS: Performed by: STUDENT IN AN ORGANIZED HEALTH CARE EDUCATION/TRAINING PROGRAM

## 2025-08-03 PROCEDURE — 70450 CT HEAD/BRAIN W/O DYE: CPT

## 2025-08-03 RX ADMIN — Medication 3 MG: at 20:33

## 2025-08-03 RX ADMIN — SODIUM CHLORIDE, PRESERVATIVE FREE 10 ML: 5 INJECTION INTRAVENOUS at 08:45

## 2025-08-03 RX ADMIN — DIVALPROEX SODIUM 125 MG: 125 CAPSULE ORAL at 20:33

## 2025-08-03 RX ADMIN — Medication 1 CAPSULE: at 08:44

## 2025-08-03 RX ADMIN — SODIUM CHLORIDE, PRESERVATIVE FREE 10 ML: 5 INJECTION INTRAVENOUS at 20:36

## 2025-08-03 RX ADMIN — DONEPEZIL HYDROCHLORIDE 5 MG: 5 TABLET, FILM COATED ORAL at 20:33

## 2025-08-03 RX ADMIN — DIVALPROEX SODIUM 125 MG: 125 CAPSULE ORAL at 08:44

## 2025-08-03 RX ADMIN — QUETIAPINE FUMARATE 25 MG: 25 TABLET ORAL at 20:33

## 2025-08-03 ASSESSMENT — PAIN - FUNCTIONAL ASSESSMENT: PAIN_FUNCTIONAL_ASSESSMENT: ACTIVITIES ARE NOT PREVENTED

## 2025-08-03 ASSESSMENT — PAIN DESCRIPTION - DESCRIPTORS: DESCRIPTORS: ACHING

## 2025-08-03 ASSESSMENT — PAIN DESCRIPTION - PAIN TYPE: TYPE: ACUTE PAIN

## 2025-08-03 ASSESSMENT — PAIN DESCRIPTION - LOCATION: LOCATION: BUTTOCKS;HEAD

## 2025-08-03 ASSESSMENT — PAIN DESCRIPTION - ONSET: ONSET: SUDDEN

## 2025-08-03 ASSESSMENT — PAIN SCALES - GENERAL: PAINLEVEL_OUTOF10: 5

## 2025-08-03 ASSESSMENT — PAIN DESCRIPTION - ORIENTATION: ORIENTATION: ANTERIOR

## 2025-08-03 ASSESSMENT — PAIN DESCRIPTION - FREQUENCY: FREQUENCY: INTERMITTENT

## 2025-08-03 NOTE — PROGRESS NOTES
Pt was being assisted to bathroom with CNA, pt was sitting on toilet to have a BM and asked the tech to leave as he sat there, Patient was very persistent about privacy and insisted her to leave, bathroom door was open to monitor patient, wife was present in room as well. Pt stood up suddenly from toilet and took a step forward and twisted around and fell backwards hitting his head on the sink.   Pt assisted from floor to wheelchair and then to bed, no visible injuries, vitals taken WNL. Pt at his baseline. Hospitalist called and arrived to bedside. Ct scan ordered.

## 2025-08-03 NOTE — PROGRESS NOTES
POST FALL MANAGEMENT    Azam May JrAreli  MEDICAL RECORD NUMBER:  [id number]  AGE: 75 y.o.   GENDER: male  : 1950  TODAYS DATE:  8/3/2025    Details     Fall Occurred: Yes    Was the Fall Witnessed:  Yes       Brief Review of Event: Pt stood up from toilet and fell          Who found the patient: CNA and wife      Where was the patient at the time of the fall: bathroom       Patient Comments: \"I was trying to do it myself, I don't need yall.\"        Date Fall Occurred:  August 3, 2025 .       Time Fall Occurred: 1:10p.m.     Assessment     Post Fall Head to Toe Assessment Completed: Yes    Post Fall Predictive Analytic Score Reviewed: Yes     Post Fall Vitals Completed: Yes    Post Fall Neuro Checks Completed: Yes    Injury Occurred(if yes, describe injury):  no           Did the Patient Experience:(Check Jarod all that apply)    [x] Patient hit head  [] Loss of consciousness  [] Change in mental status following the fall  [] Patient is on an anticoagulant medication      CT Performed:  yes    Follow-up     Persons Notified of Fall:  (Provide names of persons notified)   [x] Physician:   [x] PIPPA:  [x] Nursing Supervisior:  [x] Manager:  [] Pharmacist:  [x] Family:  [] Other:      Electronically signed by Rachel Rodriguez RN 8/3/2025 at 4:49 PM

## 2025-08-03 NOTE — PROGRESS NOTES
Hospitalist Progress Note   Admit Date:  2025 11:33 AM   Name:  Azam May Jr.   Age:  75 y.o.  Sex:  male  :  1950   MRN:  [id number]   Room:  [facility]    Presenting/Chief Complaint: No chief complaint on file.     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 is a 75 y.o. male with medical history of post procedure meningitis, NPH, encephalopathy, CKD, GERD, hx prostate cancer transferred to [facility] for ongoing therapy and became agitated, CODE KIERRA called and Hospitalist consulted for assistance.  After starting seroquel qhs and prn ativan, mood improved significantly.    Recalled by staff today after patient fell upon getting up from bathroom.  RN reports that patient did not ask for assistance, lost his footing and fell back hitting his head.  No reported LOC, denied pre-syncopal symptoms, denies CP, dyspnea, visual deficits, urinary or fecal incontinence.  Stat VS stable with glucose 124.  Orthostatic VS ordered along with stat CT head.  Per RN, wife in room during time of event.    Subjective & 24hr Events:   \"I hurt a little in the back of my head but I'm okay.\"  Calm 75y.o. WM sitting up in bed, wife and RN at bedside    Admits to mild posterior head pain.  Denies dizziness, visual deficits, dyspnea, CP, weakness in extremities of acute onset.    Assessment & Plan:     Principal Problem:    Agitation  - improved / resolved  - cont seroquel qhs   - prn ativan in place    Active Problems:    Fall  - witnessed and due to loss of balance  - neurologically intact, no focal deficits on exam  - VS / orthostatic VS stable, glucose wnl  - CT head pending though I anticipate no acute abnl; final radiologic read for CT head \"no CT evident acute intracranial process\"  - cont fall precautions!!      Post procedural meningitis  - vanco / merrem course per ID recommendations completed 2025  - VSS, no leukocytosis      CKD   -  bleeding  Eyes:  Sclerae appear normal.  Pupils equally round. EOM / peripheral vision intact  ENT:  Nares appear normal.  Moist oral mucosa  Neck:  No restricted ROM.  Trachea midline, no nuchal rigidity   CV:   RRR.  No m/r/g.  No jugular venous distension.  Lungs:   CTAB.  No wheezing, rhonchi, or rales.  Symmetric expansion.  Abdomen:   Soft, nontender, nondistended.  Extremities: No cyanosis or clubbing.  No edema; symmetrical muscle strength b/l UE  Skin:     No rashes.  Normal coloration.   Warm and dry.    Neuro:  A&Ox3; no gross focal deficits, no gross seizure activities   Psych:  Mood and affect appropriate      I have personally reviewed labs and tests:  Recent Labs:  Recent Results (from the past 48 hours)   CBC with Auto Differential    Collection Time: 08/02/25  6:28 AM   Result Value Ref Range    WBC 4.6 4.3 - 11.1 K/uL    RBC 4.56 4.23 - 5.6 M/uL    Hemoglobin 14.2 13.6 - 17.2 g/dL    Hematocrit 43.8 41.1 - 50.3 %    MCV 96.1 82 - 102 FL    MCH 31.1 26.1 - 32.9 PG    MCHC 32.4 31.4 - 35.0 g/dL    RDW 14.0 11.9 - 14.6 %    Platelets 181 150 - 450 K/uL    MPV 10.5 9.4 - 12.3 FL    nRBC 0.00 0.0 - 0.2 K/uL    Differential Type AUTOMATED      Neutrophils % 75.7 43.0 - 78.0 %    Lymphocytes % 9.8 (L) 13.0 - 44.0 %    Monocytes % 5.2 4.0 - 12.0 %    Eosinophils % 5.0 0.5 - 7.8 %    Basophils % 1.7 0.0 - 2.0 %    Immature Granulocytes % 2.6 0.0 - 5.0 %    Neutrophils Absolute 3.47 1.70 - 8.20 K/UL    Lymphocytes Absolute 0.45 (L) 0.50 - 4.60 K/UL    Monocytes Absolute 0.24 0.10 - 1.30 K/UL    Eosinophils Absolute 0.23 0.00 - 0.80 K/UL    Basophils Absolute 0.08 0.00 - 0.20 K/UL    Immature Granulocytes Absolute 0.12 0.0 - 0.5 K/UL   Basic Metabolic Panel w/ Reflex to MG    Collection Time: 08/02/25  6:28 AM   Result Value Ref Range    Sodium 141 136 - 145 mmol/L    Potassium 4.3 3.5 - 5.1 mmol/L    Chloride 107 98 - 107 mmol/L    CO2 23 20 - 29 mmol/L    Anion Gap 11 7 - 16 mmol/L    Glucose 90 70 - 99

## 2025-08-04 PROCEDURE — 97530 THERAPEUTIC ACTIVITIES: CPT

## 2025-08-04 PROCEDURE — 97535 SELF CARE MNGMENT TRAINING: CPT

## 2025-08-04 PROCEDURE — 6370000000 HC RX 637 (ALT 250 FOR IP): Performed by: NURSE PRACTITIONER

## 2025-08-04 PROCEDURE — APPSS15 APP SPLIT SHARED TIME 0-15 MINUTES: Performed by: PHYSICIAN ASSISTANT

## 2025-08-04 PROCEDURE — 1100000003 HC PRIVATE W/ TELEMETRY

## 2025-08-04 PROCEDURE — 6370000000 HC RX 637 (ALT 250 FOR IP): Performed by: STUDENT IN AN ORGANIZED HEALTH CARE EDUCATION/TRAINING PROGRAM

## 2025-08-04 PROCEDURE — 92507 TX SP LANG VOICE COMM INDIV: CPT

## 2025-08-04 PROCEDURE — 97116 GAIT TRAINING THERAPY: CPT

## 2025-08-04 RX ADMIN — DIVALPROEX SODIUM 125 MG: 125 CAPSULE, COATED PELLETS ORAL at 20:20

## 2025-08-04 RX ADMIN — LOPERAMIDE HYDROCHLORIDE 2 MG: 2 CAPSULE ORAL at 08:49

## 2025-08-04 RX ADMIN — LOPERAMIDE HYDROCHLORIDE 2 MG: 2 CAPSULE ORAL at 16:34

## 2025-08-04 RX ADMIN — QUETIAPINE FUMARATE 25 MG: 25 TABLET ORAL at 20:20

## 2025-08-04 RX ADMIN — Medication 1 CAPSULE: at 08:37

## 2025-08-04 RX ADMIN — DICYCLOMINE HYDROCHLORIDE 20 MG: 20 TABLET ORAL at 11:46

## 2025-08-04 RX ADMIN — DIVALPROEX SODIUM 125 MG: 125 CAPSULE, COATED PELLETS ORAL at 08:38

## 2025-08-04 RX ADMIN — Medication 3 MG: at 20:20

## 2025-08-04 RX ADMIN — DONEPEZIL HYDROCHLORIDE 5 MG: 5 TABLET, FILM COATED ORAL at 20:20

## 2025-08-04 ASSESSMENT — PAIN SCALES - GENERAL: PAINLEVEL_OUTOF10: 4

## 2025-08-04 NOTE — PROGRESS NOTES
PHYSICAL THERAPY DAILY NOTE      PT Co-Treatment Minutes  Time In: 1031  Time Out: 1125  Minutes: 54               Total Treatment Time:  54 Minutes    Pt. Seen for: AM, Gait Training, and Transfer Training     Subjective: Patient had no complaints.         Objective: Wife present during session . Doctor suggested to family with patients behavior for wife to be present to assist with participation.  Restrictions/Precautions: Fall Risk, General Precautions, Bed Alarm                        GROSS ASSESSMENT           COGNITION Daily Assessment    Overall Orientation Status: Impaired            BED/MAT MOBILITY Daily Assessment     Supine to Sit: Partial/Moderate assistance  Sit to Supine: Unable to assess        TRANSFERS Daily Assessment    Sit to Stand: Minimal Assistance;Substantial/Maximal assistance (assistance varies depending on behavior)  Stand to Sit: Unable to assess  Stand Pivot Transfers: Minimal Assistance;Substantial/Maximal assistance          GAIT Daily Assessment    Surface: Level tile  Device: Rolling Walker  Assistance: Minimal assistance;Substantial/Maximal assistance;2 Person assistance  Gait Deviations: Deviated path;Decreased head and trunk rotation;Decreased step height;Decreased step length;Slow Marissa  Distance: 150 x1  Comments: Patient shuffles but when cued can take bigger steps.  More Ambulation?: No              STEPS/STAIRS Daily Assessment    Stairs?: No              BALANCE Daily Assessment    Static Sitting: Fair:  Pt. requires UE support;  may need occasional min A  Dynamic Sitting: Poor - unable to accept challenge or move without LOB   Static Standing: Poor:  Pt. requires UE support & mod-max A to maintain position  Dynamic Standing: Poor - unable to accept challenge or move without LOB        WHEELCHAIR MOBILITY Daily Assessment                          LOWER EXTREMITY EXERCISES   Co treated with OT dressing and hygiene. He requires redirection at times and it helps to have 2

## 2025-08-04 NOTE — PROGRESS NOTES
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 Events of fall over weekend noted in EMR; head CT unremarkable. BMP, CBC from 8/2 non-contributory. Patient still with frequent loose stools despite IV ABX ending 8/2; stool studies for C diff returned negative as expected. All questions and concerns were addressed at this time.    Objective:     Current Facility-Administered Medications   Medication Dose Route Frequency    divalproex (DEPAKOTE SPRINKLE) DR capsule 125 mg  125 mg Oral 2 times per day    dicyclomine (BENTYL) tablet 20 mg  20 mg Oral 4x Daily PRN    loperamide (IMODIUM) capsule 2 mg  2 mg Oral 4x Daily PRN    lactobacillus (CULTURELLE) capsule 1 capsule  1 capsule Oral Daily with breakfast    QUEtiapine (SEROQUEL) tablet 25 mg  25 mg Oral Nightly    donepezil (ARICEPT) tablet 5 mg  5 mg Oral Nightly    LORazepam (ATIVAN) tablet 0.5 mg  0.5 mg Oral BID PRN    cyanocobalamin injection 1,000 mcg  1,000 mcg IntraMUSCular Weekly    Followed by    [START ON 8/27/2025] cyanocobalamin injection 1,000 mcg  1,000 mcg IntraMUSCular Q30 Days    sodium chloride flush 0.9 % injection 5-40 mL  5-40 mL IntraVENous 2 times per day    sodium chloride flush 0.9 % injection 5-40 mL  5-40 mL IntraVENous PRN    acetaminophen (TYLENOL) tablet 650 mg  650 mg Oral Q4H PRN    polyethylene glycol (GLYCOLAX) packet 17 g  17 g Oral Daily PRN    sodium phosphate (FLEET) rectal enema 1 enema  1 enema Rectal Daily PRN    bisacodyl (DULCOLAX) suppository 10 mg  10 mg Rectal Daily PRN    calcium carbonate (TUMS) chewable tablet 500 mg  500 mg Oral TID PRN    hydrALAZINE (APRESOLINE) tablet 10 mg  10 mg Oral TID PRN    ondansetron (ZOFRAN-ODT)  and adaptive equipment as needed.  - Intensive therapies indicated for safe transit to the home environment.  - Patient and family/caregivers also require education in post-hospital precautions and home exercise routine, adaptive techniques and deficit compensation strategies, strengthening and conditioning, equipment prescription and instructions in use.  to be assigned to patient's care to help facilitate all disposition needs and equipment requests provided by treating team members.  - Cognition- Patient has the potential for residual deficits in orientation, processing, attention, thought organization, problem solving, reasoning, word retrieval, and memory given recent encephalopathy. Therefore, requires continued routine follow up primarily by Speech Language Pathology in addition to Occupational Therapy to address potential underlying deficits and working on higher level-cognitive function with compensatory strategies as indicated.  - RT-maintain O2 saturations greater than 92% during physical and endurance activities. Will consult RT if concerns with maintaining O2 saturations  - Diet- ADULT DIET; Regular  ADULT ORAL NUTRITION SUPPLEMENT; HS Snack; Snack; as needed night time snack  - DME: TBD  - Family Training: TBD  - Dispo: Home with home health services                 Active medical conditions requiring ongoing and daily monitoring:  //Right lower extremity edema  -Doppler ultrasound 7/24 ruled out DVT    //CKD 3  -Creatinine 1.73 on 7/28  -Creatinine improved to 1.4 on 7/31  - This appears baseline  -Monitor BMP during rehab stay  -Continue to encourage fluid hydration     //Irregular heartbeat  -EKG obtained 7/25, independently reviewed by myself  -QTc within normal limits  -PVCs noted, normal sinus rhythm  -Depending on lability of heart rate, may benefit from beta-blocker  -Continue monitoring during rehab stay  -7/30 repeat ECG again with sinus rhythm, frequent PVCs, no significant

## 2025-08-04 NOTE — PROGRESS NOTES
End of Shift Note      Acute Onset Mental Status change? No    Does the patient have Inattention? Behavior not present    Does the patient have Disorganized Thinking? Fluctuates    Does the patient have Altered Level of Consciousness? Behavior not present    Is the patient impulsive? Yes    Expression of Ideas and Wants  Does the patient express ideas and wants without difficulty? Yes    Does the patient understand verbal and non-verbal content without cues or repetition? No, Understands only basic conversations or simple, direct phrases or if frequently requires cues to understand    Self-Care  Eating:  Did the patient eat by mouth? Yes.           Mobility  Chair/bed-to-chair Transfer:  Did the patient transfer to and from a bed to a chair or wheelchair? Yes; Requires supervision for safety, steadying, or contact guard assistance.    Safety rounds maintained.   Toilet Transfer:    Did the patient use a standard toilet with or without a raised seat or bedside commode? Yes; Requires supervision for safety, steadying, or contact guard assistance.Day-to-Day Activities; Occasionally    Bladder   Does the patient urinate? Yes  Does the patient have a catheter? No; Is the patient incontinent? No  Does the patient have stress incontinence? No    Bowel  Date of last BM 8/4   Does the patient have an ostomy? No; Is the patient incontinent? No    Pain  Does the patient have pain that affects any of the following: Sleep, Therapy Activities, or Day-to-Day Activities? None of the activities are affected.

## 2025-08-04 NOTE — PROGRESS NOTES
[facility] OCCUPATIONAL THERAPY DAILY NOTE    OT Co-Treatment Minutes  Time In: 1301  Time Out: 1353  Minutes: 52             Subjective: Pt agreeable to treatment.   Pain: RN notified  and c/o pain on buttocks while seated in chair.  Interdisciplinary Communication: Collaborated with PT and RN regarding pt status, pt performance, and handoff communication.   Precautions: Falls, Poor Safety Awareness, Posey Alarm, Cognitive Impairment, fluctuating behavior, and virtual sitter / 1:1     FUNCTIONAL MOBILITY:       Bed Mobility NT    Sit to Stand Dependent     Transfer  Dependent   Transfer Type:    Equipment: RW    Ambulation Praveen-MOD  Equipment: RW       - Activity Tolerance - Balance - Energy Conservation/Pacing  - Family Training - Strengthening   Pt initially seated in bedside chair and required significantly increased time for STS from recliner chair, pt performed multiple attempts d/t frustrations with staff attempting to assist initially. Pt then agreeable for staff assist. Pt with poor initiation with movements and problem solving/safety awareness with no carry over. Pt with MOD AX 2 for STS.     Pt participated in functional mobility in room and out of room and back for improved ADL/community moility performance with MIN FWW R lean with poor FWW management with w/c follow to ensure pt safety. Pt required increased time for tasks d/t required re-direction d/t distractions. Pt with poor insight into deficits and safety awareness. Task to work on mobility for maneuvering around home safely. Pt with fluctuating behaviors during mobility with 2 person A to ensure staff and pt safety. Increased time d/t distractions and required redirection.    Pt participated in functional stairs mobility, pt agreeable for home navigation and home safety. Pt noted to have a few verbal outbursts with required second person to distract from frustrations. Pt with few LOB's noted d/t impulsivity and poor insight. Discussed with pt's spouse

## 2025-08-04 NOTE — PLAN OF CARE
Problem: Safety - Adult  Goal: Free from fall injury  8/4/2025 0729 by Anu Sosa, RN  Outcome: Progressing  8/3/2025 2050 by Britni Blanco, RN  Outcome: Progressing     Problem: Skin/Tissue Integrity  Goal: Skin integrity remains intact  Description: 1.  Monitor for areas of redness and/or skin breakdown  2.  Assess vascular access sites hourly  3.  Every 4-6 hours minimum:  Change oxygen saturation probe site  4.  Every 4-6 hours:  If on nasal continuous positive airway pressure, respiratory therapy assess nares and determine need for appliance change or resting period  Outcome: Progressing

## 2025-08-04 NOTE — PROGRESS NOTES
PHYSICAL THERAPY DAILY NOTE      PT Co-Treatment Minutes  Time In: 1301  Time Out: 1353  Minutes: 52               Total Treatment Time:  52 Minutes    Pt. Seen for: PM, Gait Training, and Transfer Training     Subjective: Patient agreeable with extra time. Doctor and wife both in room upon start of session.         Objective:  Restrictions/Precautions: Fall Risk, General Precautions, Bed Alarm                        GROSS ASSESSMENT           COGNITION Daily Assessment    Overall Orientation Status: Impaired            BED/MAT MOBILITY Daily Assessment     Supine to Sit: Partial/Moderate assistance  Sit to Supine: Partial/Moderate assistance        TRANSFERS Daily Assessment    Sit to Stand: Minimal Assistance;Substantial/Maximal assistance  Stand to Sit: Minimal Assistance;Substantial/Maximal assistance  Stand Pivot Transfers: Minimal Assistance;Substantial/Maximal assistance  Car Transfer: Partial/Moderate assistance          GAIT Daily Assessment    Surface: Level tile  Device: Rolling Walker  Assistance: Minimal assistance;Substantial/Maximal assistance  Gait Deviations: Deviated path;Decreased head and trunk rotation;Decreased step height;Decreased step length;Slow Marissa  Distance: 125 x 2  Comments: Patient shuffles but when cued can take bigger steps.  More Ambulation?: No              STEPS/STAIRS Daily Assessment    Stairs?: Yes   # Steps : 4  Stairs Height: 6\"  Rails: Bilateral  Assistance: Partial/Moderate assistance          BALANCE Daily Assessment    Static Sitting: Fair:  Pt. requires UE support;  may need occasional min A  Dynamic Sitting: Poor - unable to accept challenge or move without LOB   Static Standing: Poor:  Pt. requires UE support & mod-max A to maintain position  Dynamic Standing: Poor - unable to accept challenge or move without LOB        WHEELCHAIR MOBILITY Daily Assessment                          LOWER EXTREMITY EXERCISES        Pain level:   Pain Location:    Pain Interventions:

## 2025-08-04 NOTE — PROGRESS NOTES
Spiritual Health Volunteer Note  [facility]      Room # 910/01    Name: Azam May Jr.           Age: 75 y.o.    Gender: male          MRN: [id number]  Islam: Restorationism       Preferred Language: English      Date: 08/04/25  Visit Time: Begin Time: 0810 End Time : 0820         Visit Summary: Spiritual Health Volunteer (Reyna) visited patient.  Volunteer offered prayer, but patient declined.       Encounter Overview/Reason: Volunteer Encounter  Service Provided For: Patient     Patient was not available. Patient declined visit but aware  can be notified as needed/desired    Marquita, Belief, Meaning:   Patient is connected with a marquita tradition or spiritual practice  Family/Friends No family/friends present    Importance and Influence:  Patient does not have spiritual/personal beliefs that influence decisions regarding their health  Family/Friends No family/friends present    Community:  Patient   unable to assess at this time   Family/Friends   No family/ friends present.    Assessment and Plan of Care:   Emotions Expressed by Patient:   Assessment: Angry    Interventions by :   Intervention: Sustaining Presence/Ministry of presence     Result/ Response by Patient:   Outcome: Refused/Declined    Patient Plan of Care:         Electronically signed by    Zarina durant with Chaplain Navi akhtar on 8/4/2025 at 11:57 AM.   Spiritual Health   [facility]  [phone]

## 2025-08-04 NOTE — PROGRESS NOTES
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]  PRIMARY DIAGNOSIS: Meningitis after procedure  ICD-10: Treatment Diagnosis:  R41.841 Cognitive-Communication Deficit    RECOMMENDATIONS:   Recommendations: cognitive communication treatment, patient/family education and training, environmental modifications, allow time before giving additional cues      Patient continues to require skilled intervention:   Recommend speech therapy services during acute inpatient rehab stay and at next level of care       ASSESSMENT   Patient's insight and irritability impacting progress, but engages better on  personally relevant interests (e.g., bowling, animals). Cognition actually worsening with no improvements in insight. He benefits from suggestions rather than direct cueing, 1 \"suggestion\" at a time, and allow patient time before giving another direction to help reduce agitiation.    GENERAL   Subjective:  Patient alert this morning, but session stopped after 5-minute due to Pt's need to go the bathroom. Patient was tired and needed more encouragement to participate, but engaged well.    Pain: No pain report.    OBJECTIVE   Cognitive-linguistic Treatment: Patient asked questions about past history and personally relevant information, which he recalled 80% of the answers with min-mod cues from wife. Patient and wife educated on medical conditions and their potential impact on cognition. Wife verbalized understanding, but Pt would benefit from continued education due to decreased awareness and insight into deficits.     PLAN    Duration/Frequency: Continue to follow patient 4-5 x/week for duration of inpatient rehab stay to address goals listed or until goals met.     Medical Necessity/Other Comments:   Patient is expected to demonstrate progress in cognitive ability to decrease assistance required

## 2025-08-04 NOTE — PROGRESS NOTES
[facility] OCCUPATIONAL THERAPY DAILY NOTE    OT Co-Treatment Minutes  Time In: 1031  Time Out: 1125  Minutes: 54             Subjective: Pt agreeable to treatment.  Pain: RN notified .  Interdisciplinary Communication: Collaborated with PT and RN regarding pt status, pt performance, and handoff communication.   Precautions: Falls, Poor Safety Awareness, Posey Alarm, Cognitive Impairment, fluctuating behavior, and virtual sitter / 1:1      FUNCTIONAL MOBILITY:       Bed Mobility Praveen and ModA    Sit to Stand Praveen and MaxA    Transfer  Praveen and MaxA  Transfer Type: SPT  Equipment: RW    Ambulation CGA and Praveen  Equipment: RW      ACTIVITIES OF DAILY LIVING:       Oral Hygiene Supervision or touching assistance SBA seated for g/h tasks seated sinkside. Therapist assisted to shave pt's face to ensure pt safety with variable behavior.   Shower/Bathe   Offered shower, pt declined; per 1:1 had a sponge bath prior. Plan for shower next ADL session.   Upper Body  Dressing Supervision or touching assistance SBA seated to don overhead shirt with minor posterior LOB   Lower Body Dressing Partial/moderate assistance Seated EOB able to participate in steps threading pants, MIN-MOD A from therapist for A, standing CGA for pants management up over hips   Donning/[place] Footwear Partial/moderate assistance Pt seated EOB able to perform figure four technique with posterior lean LOB with MIN A for doffing, to don shoes with MIN A while seated EOB with built in shoe horn/no laces   Education Adaptive ADL Techniques, Benefits of OT, Energy Conservation, Pacing, Rolling Walker Management, and Safety Awareness     Pt participated in functional mobility for improved ADL performance with MIN-CGA FWW with w/c follow to ensure pt safety. Pt required increased time for tasks d/t required re-direction d/t distractions. Pt with poor insight into deficits and safety awareness.     Assessment: Patient seen with co-tx with PT d/t variable level of

## 2025-08-05 ENCOUNTER — APPOINTMENT (OUTPATIENT)
Dept: GENERAL RADIOLOGY | Age: 75
DRG: 091 | End: 2025-08-05
Attending: STUDENT IN AN ORGANIZED HEALTH CARE EDUCATION/TRAINING PROGRAM
Payer: MEDICARE

## 2025-08-05 LAB
BASOPHILS # BLD: 0.08 K/UL (ref 0–0.2)
BASOPHILS NFR BLD: 1.5 % (ref 0–2)
DIFFERENTIAL METHOD BLD: ABNORMAL
EOSINOPHIL # BLD: 0.17 K/UL (ref 0–0.8)
EOSINOPHIL NFR BLD: 3.1 % (ref 0.5–7.8)
ERYTHROCYTE [DISTWIDTH] IN BLOOD BY AUTOMATED COUNT: 14.3 % (ref 11.9–14.6)
HCT VFR BLD AUTO: 39.4 % (ref 41.1–50.3)
HGB BLD-MCNC: 13.1 G/DL (ref 13.6–17.2)
IMM GRANULOCYTES # BLD AUTO: 0.14 K/UL (ref 0–0.5)
IMM GRANULOCYTES NFR BLD AUTO: 2.6 % (ref 0–5)
LYMPHOCYTES # BLD: 0.54 K/UL (ref 0.5–4.6)
LYMPHOCYTES NFR BLD: 10 % (ref 13–44)
MCH RBC QN AUTO: 31.2 PG (ref 26.1–32.9)
MCHC RBC AUTO-ENTMCNC: 33.2 G/DL (ref 31.4–35)
MCV RBC AUTO: 93.8 FL (ref 82–102)
MONOCYTES # BLD: 0.14 K/UL (ref 0.1–1.3)
MONOCYTES NFR BLD: 2.6 % (ref 4–12)
NEUTS SEG # BLD: 4.34 K/UL (ref 1.7–8.2)
NEUTS SEG NFR BLD: 80.2 % (ref 43–78)
NRBC # BLD: 0 K/UL (ref 0–0.2)
PLATELET # BLD AUTO: 168 K/UL (ref 150–450)
PMV BLD AUTO: 10.3 FL (ref 9.4–12.3)
RBC # BLD AUTO: 4.2 M/UL (ref 4.23–5.6)
WBC # BLD AUTO: 5.4 K/UL (ref 4.3–11.1)

## 2025-08-05 PROCEDURE — 97530 THERAPEUTIC ACTIVITIES: CPT

## 2025-08-05 PROCEDURE — 85025 COMPLETE CBC W/AUTO DIFF WBC: CPT

## 2025-08-05 PROCEDURE — 1100000003 HC PRIVATE W/ TELEMETRY

## 2025-08-05 PROCEDURE — 97542 WHEELCHAIR MNGMENT TRAINING: CPT

## 2025-08-05 PROCEDURE — 97116 GAIT TRAINING THERAPY: CPT

## 2025-08-05 PROCEDURE — 36415 COLL VENOUS BLD VENIPUNCTURE: CPT

## 2025-08-05 PROCEDURE — 73560 X-RAY EXAM OF KNEE 1 OR 2: CPT

## 2025-08-05 PROCEDURE — 92507 TX SP LANG VOICE COMM INDIV: CPT

## 2025-08-05 PROCEDURE — 6370000000 HC RX 637 (ALT 250 FOR IP): Performed by: STUDENT IN AN ORGANIZED HEALTH CARE EDUCATION/TRAINING PROGRAM

## 2025-08-05 PROCEDURE — APPSS15 APP SPLIT SHARED TIME 0-15 MINUTES: Performed by: PHYSICIAN ASSISTANT

## 2025-08-05 PROCEDURE — 97535 SELF CARE MNGMENT TRAINING: CPT

## 2025-08-05 PROCEDURE — 6370000000 HC RX 637 (ALT 250 FOR IP): Performed by: NURSE PRACTITIONER

## 2025-08-05 RX ORDER — LACTOBACILLUS RHAMNOSUS GG 10B CELL
1 CAPSULE ORAL 2 TIMES DAILY WITH MEALS
Status: DISCONTINUED | OUTPATIENT
Start: 2025-08-05 | End: 2025-08-06 | Stop reason: HOSPADM

## 2025-08-05 RX ADMIN — QUETIAPINE FUMARATE 25 MG: 25 TABLET ORAL at 20:37

## 2025-08-05 RX ADMIN — DIVALPROEX SODIUM 125 MG: 125 CAPSULE, COATED PELLETS ORAL at 20:37

## 2025-08-05 RX ADMIN — DIVALPROEX SODIUM 125 MG: 125 CAPSULE, COATED PELLETS ORAL at 09:15

## 2025-08-05 RX ADMIN — Medication 1 CAPSULE: at 17:15

## 2025-08-05 RX ADMIN — Medication 3 MG: at 20:37

## 2025-08-05 RX ADMIN — LOPERAMIDE HYDROCHLORIDE 2 MG: 2 CAPSULE ORAL at 09:17

## 2025-08-05 NOTE — PATIENT CARE CONFERENCE
[facility] [place], [place]  INPATIENT REHABILITATION  TEAM CONFERENCE NOTE    Conference Date: 2025  Admit Date: 2025 11:33 AM  Patient Name: Azam May Jr.    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  ---------------------------------------------------------------------------------------------------    Case Management:  Patient's PLOF: Independent with IADLs and Independent with ADLs  Patient's Prior Living Situation: Lives with spouse/significant other  Baseline Supervision/Assistance: None  Current issues/needs regarding patient and family discharge status: None    ---------------------------------------------------------------------------------------------------    Functional Assessment:  Most Recent Mobility Scores Per Physical Therapy:   Bed/Mat Mobility Supine to Sit: Partial/Moderate assistance  Sit to Supine: Partial/Moderate assistance   Transfers Sit to Stand: Minimal Assistance;Substantial/Maximal assistance  Stand to Sit: Minimal Assistance;Substantial/Maximal assistance  Stand Pivot Transfers: Minimal Assistance;Substantial/Maximal assistance  Car Transfer: Partial/Moderate assistance    Gait Surface: Level tile  Device: Rolling Walker  Assistance: Minimal assistance;Substantial/Maximal assistance  Gait Deviations: Deviated path;Decreased head and trunk rotation;Decreased step height;Decreased step length;Slow Marissa  Distance: 125 x 2  Comments: Patient shuffles but when cued can take bigger steps.  More Ambulation?: No          Steps/Stairs Stairs?: Yes   # Steps : 4  Stairs Height: 6\"  Rails:  be adjusted as needed; Cognitive function/mental status: Has the potential for residual deficits in orientation, processing, attention, thought organization, problem solving, reasoning, word retrieval, and memory given recent injury. Therefore, patient may require continued routine follow up primarily by Speech Language Pathology in addition to Occupational Therapy to address potential underlying deficits and working on higher level-cognitive function with compensatory strategies as indicated; Deep venous thromboembolism: patient is at increased risk for thromboembolic event given recent injury, new mobility limitations and current hospitalization. Therefore, maintain on mechanical DVT prophylaxis and encourage progressive out of mobility while continuing routine monitoring for potential thromboembolic events; Bowel management: increased potential for recurrent constipation given mobility limitations, current hospitalization, and medication use. Will need to continue routine monitoring of bowel function with appropriate adjustment in bowel regimen as indicated to ensure adequate bowel management; Bladder management: Monitor urinary output and urinary function/dysfunction. Does have the potential for underlying bladder dysfunction given immobility. Therefore, requires continued routine monitoring with appropriate bladder management as indicated to avoid urinary tract complications.      Recent Labs     08/03/25  1345   POCGLU 124*     No results found for: \"LDLDIRECT\"        ---------------------------------------------------------------------------------------------------    Discharge Plan:  Family Training and Education: Scheduled  Education Topics: ADL training using AE/DME prn, mobility training using AE/DME prn, home modifications, and safety , level of assist recommended for discharge.  Estimated Discharge Date: 8/6/2025  Discharge Destination: Home with  home health  Services at Discharge: Home Health:

## 2025-08-05 NOTE — PROGRESS NOTES
PHYSICAL THERAPY DAILY NOTE      PT Co-Treatment Minutes  Time In: 1118  Time Out: 1207  Minutes: 49               Total Treatment Time:  49 Minutes    Pt. Seen for: AM, Family Training, Gait Training, and Transfer Training     Subjective: Patient in bathroom upon entering the room.         Objective:  Restrictions/Precautions: Fall Risk, General Precautions, Bed Alarm                        GROSS ASSESSMENT           COGNITION Daily Assessment    Overall Orientation Status: Impaired            BED/MAT MOBILITY Daily Assessment     Supine to Sit: Unable to assess  Sit to Supine: Unable to assess        TRANSFERS Daily Assessment    Sit to Stand: Minimal Assistance;Substantial/Maximal assistance  Stand to Sit: Minimal Assistance;Substantial/Maximal assistance  Stand Pivot Transfers: Minimal Assistance;Substantial/Maximal assistance  Car Transfer:  (demonstrated and went over transfer with wife placing wheelchair in position of the car)  Comment: practice with wife octavia steady lift which wife has purchased for home.          GAIT Daily Assessment    Surface: Level tile  Device: Rolling Walker  Assistance: Minimal assistance;Substantial/Maximal assistance  Distance: 10 x 1  More Ambulation?: No              STEPS/STAIRS Daily Assessment    Stairs?: No              BALANCE Daily Assessment    Static Sitting: Fair:  Pt. requires UE support;  may need occasional min A  Dynamic Sitting: Poor - unable to accept challenge or move without LOB   Static Standing: Poor:  Pt. requires UE support & mod-max A to maintain position  Dynamic Standing: Poor - unable to accept challenge or move without LOB        WHEELCHAIR MOBILITY Daily Assessment                          LOWER EXTREMITY EXERCISES   Answered wifes questions and gave her handout for home sitters.     Pain level: no pain noted  Pain Location:    Pain Interventions:     Vital Signs:  /76   Pulse 72   Temp 98.6 °F (37 °C) (Oral)   Resp 18   Wt 93.4 kg (205 lb

## 2025-08-05 NOTE — PLAN OF CARE
Problem: Safety - Adult  Goal: Free from fall injury  Outcome: Progressing     Problem: Skin/Tissue Integrity  Goal: Skin integrity remains intact  Description: 1.  Monitor for areas of redness and/or skin breakdown  2.  Assess vascular access sites hourly  3.  Every 4-6 hours minimum:  Change oxygen saturation probe site  4.  Every 4-6 hours:  If on nasal continuous positive airway pressure, respiratory therapy assess nares and determine need for appliance change or resting period  Outcome: Progressing  Flowsheets (Taken 8/4/2025 1945)  Skin Integrity Remains Intact: Monitor for areas of redness and/or skin breakdown     Problem: Pain  Goal: Verbalizes/displays adequate comfort level or baseline comfort level  Outcome: Progressing

## 2025-08-05 NOTE — PROGRESS NOTES
PHYSICAL THERAPY DISCHARGE SUMMARY      PT Co-Treatment Minutes  Time In: 1348  Time Out: 1429  Minutes: 41                 Total Treatment Time:  41 Minutes           Precautions at discharge:      Restrictions/Precautions: Fall Risk, General Precautions, Bed Alarm                    Impairments:    Decreased LE strength  [x]     Decreased strength trunk/core  [x]     Decreased AROM   []     Decreased PROM  []    Decreased endurance  [x]     Decreased balance sitting  [x]     Decreased balance standing  [x]     Pain   [x]     Slow ambulation velocity  [x]    Decreased coordination  [x]    Decreased safety awareness  [x]       Functional Limitations:   Decreased independence with bed mobility  [x]     Decreased independence with functional transfers  [x]     Decreased independence with ambulation  [x]     Decreased independence with stair negotiation  [x]               Objective:     Vital Signs: /60   Pulse 62   Temp 97.9 °F (36.6 °C) (Oral)   Resp 17   Wt 93.4 kg (205 lb 12.8 oz)   SpO2 94%   BMI 29.53 kg/m²      Pain level: pt. Unable to rate  Pain location: R knee  Pain interventions: applied ace wrap per patient request     Patient education:    Education Given To: Family  Education Provided: Transfer Training;DME/Home Modifications;Family Education;Equipment  Education Provided Comments: Pt's wife educated on how to manage parts of pt's w/c  Education Method: Verbal;Demonstration  Barriers to Learning: None  Education Outcome: Demonstrated understanding;Verbalized understanding    Interdisciplinary Communication: performed co-treatment w/ OT as pt. Requires Ax2 for safe mobilty tasks       Cognition:      Following Commands: Follows one step commands with increased time, Follows one step commands with repetition  Attention Span: Impaired  Memory: Impaired  Safety Judgement: Impaired  Problem Solving: Impaired  Insights: Decreased awareness of deficits  Initiation: Requires cues for all  Sequencing:

## 2025-08-05 NOTE — PROGRESS NOTES
Pt with 2 briefs on this morning at shift change, both saturated. Assisted to BR with RN and cna, cleaned, brief on. Back to bed, bed alarm on, virtual sitter, bed alarm on, breakfast set up.

## 2025-08-05 NOTE — PROGRESS NOTES
[facility] OCCUPATIONAL THERAPY DISCHARGE NOTE    OT Co-Treatment Minutes  Time In: 1118  Time Out: 1207  Minutes: 49             GOALS:  GOALS:  Short Term Goals:  Time Frame for Short Term Goals: 7 days   STG 1: Patient will dress UB with Praveen using AE/DME PRN.goal met 8/5/25  STG 2: Patient will dress LB with Substantial/Maximum Assistance using AE/DME PRN.goal met 8/5/25  STG 3: Patient will don footwear with Substantial/Maximum Assistance using AE/DME PRN.goal met 8/5/25  STG 4: Patient will bathe with Partial/Moderate Assistance using AE/DME PRN.goal met 8/5/25  STG 5: Patient will toilet with Partial/Moderate Assistance using AE/DME PRN.goal met 8/5/25     Long Term Goals:  Time Frame for Long Term Goals: 21 days   LTG 1: Patient will dress UB with Supervision using AE/DME PRN. Goal not met 8/5/25-continue with HHOT.  LTG 2: Patient will dress LB with CGA using AE/DME PRN. Goal not met 8/5/25-continue with HHOT.  LTG 3: Patient will don footwear with Praveen using AE/DME PRN. Goal not met 8/5/25-continue with HHOT.  LTG 4: Patient will bathe with CGA using AE/DME PRN. Goal not met 8/5/25-continue with HHOT.  LTG 5: Patient will toilet with CGA using AE/DME PRN. Goal not met 8/5/25-continue with HHOT.  LTG 6: Patient/caregiver will verbalize understanding of OT recommendations regarding ADLs, functional transfers, home safety, AE/DME, energy conservation, safety awareness, activity tolerance, and follow-up therapy to increase safety with functional tasks upon discharge.goal met 8/5/25 with spouse present/pt with poor functional cognition.     Subjective: Patient agreeable to therapy. \"I'll believe it when I see it\" in regards to d/c home tomorrow with 24/7 SPV/assist.   Pain: RN notified .  Precautions: Falls, Poor Safety Awareness, Posey Alarm, Cognitive Impairment, and virtual sitter     FUNCTIONAL MOBILITY:        Bed Mobility NT    Sit to Stand Praveen and ModA    Transfer  Praveen and ModA  Transfer Type:    Equipment:  on toilet to don/doff B socks   Toileting Hygiene Dependent  Pt reported he just goes in brief- would require 2 person A d/t functional cognitive deficits for chato cares/pants management Partial/moderate assistance  Pt seated on toilet with A for posterior care management/pt able tocomplete pants management up over hips, unaware of incontinent episode upon entry to toilet.   Toilet Transfer Dependent  Bedpan/2 person A SPT BSC d/t functional cognitive deficits- Partial/moderate assistance  FWW with BSC placed over toilet with cues   Initial Score: Patient's lowest score within first 72 hrs of inpatient rehab stay.   Current Score: Patient's average performance level over the last 48 hours.     Discharge Location: Home with Family Support  Recommended Therapy/Supervision at Discharge: HHOT and 24 Hour Supervision  Recommended Equipment: BSC, w/c, and octavia steady  Safety/Functional Level: Pt completes ADLs with SBA and MaxA using Rolling Walker, octavia steady, BSC, Grab Bars, and Tub Transfer Bench. Pt requires full assist for mobility related ADLs- OT recommends use of octavia steady at home for functional t/f's and use of w/c for mobility to ensure pt safety; pt has fluctuating cognition that impacts pt's functional mobility, pt is a High fall risk. Pt will require total assist with IADLs such as home management, cooking, and cleaning. Pt with ongoing functional cognitive deficits.  Family Training: Completed 8/5/25 and 8/4/25.  Residual Deficits: Decreased strength, activity tolerance, balance, cognition, functional mobility, safety awareness, ADL performance , and IADL performance.    Summary of Progress: Patient demonstrates fair (-) progress with strength, activity tolerance, functional mobility, AE/DME use, and ADL performance , see above for details. Patient continues to require skilled OT services to address deficits and maximize pt safety and independence. Pt with ongoing fluctuations with functional mobility and

## 2025-08-05 NOTE — PLAN OF CARE
Problem: Skin/Tissue Integrity  Goal: Skin integrity remains intact  Description: 1.  Monitor for areas of redness and/or skin breakdown  2.  Assess vascular access sites hourly  3.  Every 4-6 hours minimum:  Change oxygen saturation probe site  4.  Every 4-6 hours:  If on nasal continuous positive airway pressure, respiratory therapy assess nares and determine need for appliance change or resting period  8/5/2025 0710 by Anu Sosa RN  Outcome: Progressing  8/5/2025 0119 by Amy Israel RN  Outcome: Progressing  Flowsheets (Taken 8/4/2025 1945)  Skin Integrity Remains Intact: Monitor for areas of redness and/or skin breakdown     Problem: Pain  Goal: Verbalizes/displays adequate comfort level or baseline comfort level  8/5/2025 0710 by Anu Sosa RN  Outcome: Progressing  8/5/2025 0119 by Amy Israel RN  Outcome: Progressing

## 2025-08-05 NOTE — CARE COORDINATION
Interdisciplinary Tuesday / Thursday, Team Conference Meeting Notes    Interdisciplinary team conference meeting completed to discuss plan of care.       Attending Staff: (Director) Lizzy Nelson (Therapy Supervisor), Dr. Oneill, (CM) Minda, (PT) Jaqueline Ford Dana, Erin, (OT) Angela Hale Joy, (SLP) Candy      Family Members Attending: TATIANA      Estimated D/C Date: 8/6/25      Recommended Follow-Up Therapy: Staff has recommended (HH) PT/OT/RN/Aide/SW      SNF Facility: NA      Home Health: [facility]    Dialysis: NA  Name of Dialysis Facility: NA      DME: Bedside commode/ wheelchair      Family Training: Therapy will schedule family training with family.        Communication with family/caregivers:  MSW reviewed / screen patient medical chart for continued stay. Patient needs are continue to be followed by Dr. Jesica Oneill. Patient was admitted on 07/25/25.  Patient has been approved for estimated length of stay for 16 days.  Patient has currently used 11 out of estimated length of stay days. Team Conference staff met and discussed patient progress and barriers for discharge home.  Dr. Oneill has recommended a discharge date 8/6/25 / therapy staff has recommended (HH) / DME's and family training.  Patient / spouse / family has requested a referral be made [facility] (HH) / DME([facility]) referral completed.  Provider for DME (bedside commode/wheelchair) has been delivered to patient room.  Family training has been requested.  Therapy staff will schedule family training.  CM will continue to follow patient plan of care and assist with supportive care. CM will continue to follow / monitor for any needs, concerns or questions that may arise.          Name of Ins: Medicare Part A And B   Policy #: [id number]    Secondary Ins: [employer] Supp   Policy #: [id number]    Auth #  Admission Date: 07/25/25  Approved Dates: 16  LOS: 11  Discharge Date: 08/06/25  Contact

## 2025-08-05 NOTE — PROGRESS NOTES
End of Shift Note      Acute Onset Mental Status change? No    Does the patient have Inattention? Behavior not present    Does the patient have Disorganized Thinking? Fluctuates    Does the patient have Altered Level of Consciousness? Behavior not present    Is the patient impulsive? Yes    Expression of Ideas and Wants  Does the patient express ideas and wants without difficulty? Yes    Does the patient understand verbal and non-verbal content without cues or repetition? Yes    Self-Care  Eating:  Did the patient eat by mouth? yes      Toileting Hygiene:  Did the patient void or have a bowel movement? Yes; The patient requires assistance with pulling pants down;    The patient uses a toilet;     In: -   Out: 2  In: -   Out: 2 [Urine:1]        Lower Body Dressing:  Did the patient dress or undress below the waist? Yes; Requires supervision for safety, steadying, or contact guard assistance.    Mobility  Chair/bed-to-chair Transfer:  Did the patient transfer to and from a bed to a chair or wheelchair? Yes; Requires two helpers.    Toilet Transfer:    Did the patient use a standard toilet with or without a raised seat or bedside commode? Yes; Requires two helpers.None of the activities are affected.    Bladder   Does the patient urinate? Yes  Does the patient have a catheter? No; Is the patient incontinent? No  Does the patient have stress incontinence? No    Safety rounds maintained.     Bowel  Date of last BM 8/4   Does the patient have an ostomy? No; Is the patient incontinent? No    Pain  Does the patient have pain that affects any of the following: Sleep, Therapy Activities, or Day-to-Day Activities? None of the activities are affected.

## 2025-08-05 NOTE — PROGRESS NOTES
[facility] OCCUPATIONAL THERAPY DAILY NOTE    OT Co-Treatment Minutes  Time In: 1348  Time Out: 1429  Minutes: 41             Subjective: Pt agreeable to treatment.   Pain: RN notified  and Dr. Oneill of R knee pain limiting mobility this day.  Interdisciplinary Communication: Collaborated with Physician, PT, and RN regarding pt status, pt performance, and handoff communication.   Precautions:  Falls, Poor Safety Awareness, Posey Alarm, Cognitive Impairment, and virtual sitter     FUNCTIONAL MOBILITY:       Bed Mobility Dependent   MOD A x2 sit > supine   Sit to Stand Dependent     Transfer  Dependent   Transfer Type:   Equipment: RW     Ambulation Praveen and MaxA  Equipment: RW       - Activity Tolerance - Family Training - Strengthening    Pt seated in recliner chair with R lateral lean in chair-notified RN and MD Dr. Oneill and R knee pain noted this date limiting mobility.     Pt required increased time during task this date for STS, pt with poor motor planning/problem solving for t/f. Pt required MOD A X2 with heavy posterior lean for STS from recliner chair. Pt participated in functional mobility with 2 person required A d/t a posterior and R lateral lean with poor safety awareness and insight. W/c placed behind pt to ensure pt safety.     Therapists reviewed w/c parts with pt's spouse. Pt participated in BUE ROM and strengthening for endurance for ADL tasks and functional mobility with therapist A for guidance and A for management of w/c.     Pt performed a SPT from w/c > EOB with MOD A X2 with posterior lean with FWW. Poor sequencing and problem solving with FWW. 2 person required A sit > supine this date. Increased time for all tasks d/t decreased functional cognition limiting pt.      Education   Benefits of OT, Energy Conservation, Pacing, Family Training, Functional Transfer Training, Rolling Walker Management, Safety Awareness, and Wheelchair Management     Assessment: Pt with ongoing fluctuations with

## 2025-08-05 NOTE — PROGRESS NOTES
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 Patient has no recall of working with PT-A yesterday going up / down stairs. AM labs reviewed. All questions and concerns were addressed at this time.    Objective:     Current Facility-Administered Medications   Medication Dose Route Frequency    lactobacillus (CULTURELLE) capsule 1 capsule  1 capsule Oral BID WC    divalproex (DEPAKOTE SPRINKLE) DR capsule 125 mg  125 mg Oral 2 times per day    dicyclomine (BENTYL) tablet 20 mg  20 mg Oral 4x Daily PRN    loperamide (IMODIUM) capsule 2 mg  2 mg Oral 4x Daily PRN    QUEtiapine (SEROQUEL) tablet 25 mg  25 mg Oral Nightly    LORazepam (ATIVAN) tablet 0.5 mg  0.5 mg Oral BID PRN    cyanocobalamin injection 1,000 mcg  1,000 mcg IntraMUSCular Weekly    Followed by    [START ON 8/27/2025] cyanocobalamin injection 1,000 mcg  1,000 mcg IntraMUSCular Q30 Days    sodium chloride flush 0.9 % injection 5-40 mL  5-40 mL IntraVENous 2 times per day    sodium chloride flush 0.9 % injection 5-40 mL  5-40 mL IntraVENous PRN    acetaminophen (TYLENOL) tablet 650 mg  650 mg Oral Q4H PRN    polyethylene glycol (GLYCOLAX) packet 17 g  17 g Oral Daily PRN    sodium phosphate (FLEET) rectal enema 1 enema  1 enema Rectal Daily PRN    bisacodyl (DULCOLAX) suppository 10 mg  10 mg Rectal Daily PRN    calcium carbonate (TUMS) chewable tablet 500 mg  500 mg Oral TID PRN    hydrALAZINE (APRESOLINE) tablet 10 mg  10 mg Oral TID PRN    ondansetron (ZOFRAN-ODT) disintegrating tablet 4 mg  4 mg Oral Q8H PRN    carboxymethylcellulose (THERATEARS) 1 % ophthalmic gel 1 drop  1 drop Both Eyes TID PRN    Benzocaine-Menthol (CEPACOL SORE THROAT) 15-2.6 MG lozenge 1  2025

## 2025-08-05 NOTE — PROGRESS NOTES
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 some improvements in overall attention and notable spontaneous improvement in initiation of verbal response compared to initial evaluation;however, ongoing poor memory, insight, reduced reasoning and executive functioning. recommend close supervision and assistance at home. ongoing ST to address cognitive communication.      GENERAL   Subjective:  \"I'm going home today\" . Wife at bedside    Pain:   Patient does not c/o pain                       OBJECTIVE   Discussed ongoing deficits with wife and ways to communicate to increase participation and reduce agitation/frustration.   Reviewed 1-step directions, increased time, how to rephrase prompts/directions, redirection if becomes agitated or argumentative. Modeled this while pt put on shoes.   Pt with poor insight/awareness which remains biggest barrier.       Initial National Outcome Measures (NOMS):   ATTENTION: Level 3: The individual maintains attention over time to complete simple living tasks of short duration with consistent maximal cueing in the absence of distracting stimuli.  MEMORY: Level 2:  The individual consistently requires maximal verbal cues or uses external aids to recall personal information

## 2025-08-06 VITALS
SYSTOLIC BLOOD PRESSURE: 109 MMHG | BODY MASS INDEX: 29.53 KG/M2 | OXYGEN SATURATION: 94 % | RESPIRATION RATE: 18 BRPM | TEMPERATURE: 98.1 F | HEART RATE: 78 BPM | DIASTOLIC BLOOD PRESSURE: 74 MMHG | WEIGHT: 205.8 LBS

## 2025-08-06 PROBLEM — G97.82 MENINGITIS AFTER PROCEDURE: Status: RESOLVED | Noted: 2025-07-25 | Resolved: 2025-08-06

## 2025-08-06 PROBLEM — M10.9 ACUTE GOUT INVOLVING TOE OF RIGHT FOOT: Status: RESOLVED | Noted: 2025-07-28 | Resolved: 2025-08-06

## 2025-08-06 PROBLEM — R20.8 DYSESTHESIA AFFECTING BOTH SIDES OF BODY: Status: RESOLVED | Noted: 2025-07-25 | Resolved: 2025-08-06

## 2025-08-06 PROBLEM — G93.41 SEPTIC ENCEPHALOPATHY: Status: RESOLVED | Noted: 2025-07-18 | Resolved: 2025-08-06

## 2025-08-06 PROBLEM — G03.8 MENINGITIS AFTER PROCEDURE: Status: RESOLVED | Noted: 2025-07-25 | Resolved: 2025-08-06

## 2025-08-06 PROCEDURE — 6360000002 HC RX W HCPCS: Performed by: STUDENT IN AN ORGANIZED HEALTH CARE EDUCATION/TRAINING PROGRAM

## 2025-08-06 PROCEDURE — 6370000000 HC RX 637 (ALT 250 FOR IP): Performed by: STUDENT IN AN ORGANIZED HEALTH CARE EDUCATION/TRAINING PROGRAM

## 2025-08-06 RX ORDER — LACTOBACILLUS RHAMNOSUS GG 10B CELL
1 CAPSULE ORAL 2 TIMES DAILY WITH MEALS
Qty: 60 CAPSULE | Refills: 0 | Status: SHIPPED | OUTPATIENT
Start: 2025-08-06 | End: 2025-08-20 | Stop reason: ALTCHOICE

## 2025-08-06 RX ORDER — THIAMINE MONONITRATE (VIT B1) 100 MG
100 TABLET ORAL DAILY
Qty: 30 TABLET | Refills: 0 | Status: SHIPPED | OUTPATIENT
Start: 2025-08-06 | End: 2025-08-20 | Stop reason: ALTCHOICE

## 2025-08-06 RX ORDER — CYANOCOBALAMIN 1000 UG/ML
INJECTION, SOLUTION INTRAMUSCULAR; SUBCUTANEOUS
Qty: 1 ML | Refills: 0 | Status: SHIPPED | OUTPATIENT
Start: 2025-08-13 | End: 2025-08-28

## 2025-08-06 RX ORDER — QUETIAPINE FUMARATE 25 MG/1
25 TABLET, FILM COATED ORAL DAILY PRN
Qty: 30 TABLET | Refills: 0 | Status: SHIPPED | OUTPATIENT
Start: 2025-08-06

## 2025-08-06 RX ORDER — DIVALPROEX SODIUM 125 MG/1
125 CAPSULE, COATED PELLETS ORAL EVERY 12 HOURS SCHEDULED
Qty: 60 CAPSULE | Refills: 0 | Status: SHIPPED | OUTPATIENT
Start: 2025-08-06

## 2025-08-06 RX ADMIN — DIVALPROEX SODIUM 125 MG: 125 CAPSULE, COATED PELLETS ORAL at 09:05

## 2025-08-06 RX ADMIN — Medication 1 CAPSULE: at 09:05

## 2025-08-06 RX ADMIN — CYANOCOBALAMIN 1000 MCG: 1000 INJECTION, SOLUTION INTRAMUSCULAR; SUBCUTANEOUS at 09:06

## 2025-08-06 NOTE — DISCHARGE SUMMARY
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  Active Problems:    Essential hypertension    GERD (gastroesophageal reflux disease)    BMI 29.0-29.9,adult    Stage 3a chronic kidney disease (CKD) (HCC)    Gait abnormality    Decreased activities of daily living (ADL)    Cognitive deficits    Impulsive  Resolved Problems:    Septic encephalopathy    Dysesthesia affecting both sides of body    Acute gout involving toe of right foot      Past Medical History:   Diagnosis Date    Allergic rhinitis     Bilateral impacted cerumen     BMI 30.0-30.9,adult     BMI 30.3    Cancer (HCC)     prostate cancer- surgery    Claustrophobia     mild    Family history of colon cancer     mother and sister    GERD (gastroesophageal reflux disease)     hx of nissen fundoplication and managed with diet    History of Gatica's esophagus     History of stomach ulcers     Hx of colonic polyps 2016    adenomas    Hypertension     no meds    IBS (irritable bowel syndrome)     no current medications    Insomnia     Insomnia     Laryngopharyngeal reflux     Neck mass     Post-nasal drainage     Pure hypercholesterolemia 2/16/2018    Severe sepsis (HCC) 07/18/2025    Urinary incontinence          PCP FOLLOW-UP:   Agitation, meds    Transition of care:  -PCP to review new medications below. PCP to determine whether or not to renew or discontinue these medications at outpatient follow-up appointment  -PCP to assess whether or not patient requires ongoing home health care services, or whether or not patient would benefit from outpatient therapy after completing home health care services after 2-4 weeks.  Renewal of  rhythm, frequent PVCs, no significant change from ECG 7/28; QTc 445  -Hospitalist team monitoring, may trial beta-blocker?     //Overweight  -Complicating all aspects of medical care and contributing to functional decline  Body mass index is 29.53 kg/m².               FOLLOW UP APPTS:    Future Appointments         Provider Specialty Dept Phone    8/7/2025 11:10 AM Nathanael Andrade Jr., MD Family Medicine [phone]    8/20/2025 2:00 PM Dorie To APRN Neurology [phone]    9/10/2025 9:00 AM Tony Salvador MD Neurology [phone]             Nathanael Andrade Jr., MD  [address] [facility] [address] [phone]    Follow up on 8/7/2025  Go to appointment 08/07/2025 at 11:10am    Tony Salvador MD  [address] [place]   [address] [address] [phone]    Follow up on 8/20/2025  Please go to appointment at 2pm on 08/20/2025. This appointment will be at the downtown location [address] [place] [address].         Labs/Studies:  Recent Results (from the past 72 hours)   CBC with Auto Differential    Collection Time: 08/05/25 10:10 AM   Result Value Ref Range    WBC 5.4 4.3 - 11.1 K/uL    RBC 4.20 (L) 4.23 - 5.6 M/uL    Hemoglobin 13.1 (L) 13.6 - 17.2 g/dL    Hematocrit 39.4 (L) 41.1 - 50.3 %    MCV 93.8 82 - 102 FL    MCH 31.2 26.1 - 32.9 PG    MCHC 33.2 31.4 - 35.0 g/dL    RDW 14.3 11.9 - 14.6 %    Platelets 168 150 - 450 K/uL    MPV 10.3 9.4 - 12.3 FL    nRBC 0.00 0.0 - 0.2 K/uL    Differential Type AUTOMATED      Neutrophils % 80.2 (H) 43.0 - 78.0 %    Lymphocytes % 10.0 (L) 13.0 - 44.0 %    Monocytes % 2.6 (L) 4.0 - 12.0 %    Eosinophils % 3.1 0.5 - 7.8 %    Basophils % 1.5 0.0 - 2.0 %    Immature Granulocytes % 2.6 0.0 - 5.0 %    Neutrophils Absolute 4.34 1.70 - 8.20 K/UL    Lymphocytes Absolute 0.54 0.50 - 4.60 K/UL    Monocytes Absolute 0.14 0.10 - 1.30 K/UL    Eosinophils Absolute 0.17 0.00 - 0.80 K/UL    Basophils Absolute 0.08 0.00 - 0.20 K/UL    Immature

## 2025-08-06 NOTE — PLAN OF CARE
Problem: Safety - Adult  Goal: Free from fall injury  Outcome: Progressing     Problem: Skin/Tissue Integrity  Goal: Skin integrity remains intact  Description: 1.  Monitor for areas of redness and/or skin breakdown  2.  Assess vascular access sites hourly  3.  Every 4-6 hours minimum:  Change oxygen saturation probe site  4.  Every 4-6 hours:  If on nasal continuous positive airway pressure, respiratory therapy assess nares and determine need for appliance change or resting period  Outcome: Progressing  Flowsheets (Taken 8/5/2025 0910 by Anu Sosa RN)  Skin Integrity Remains Intact:   Monitor for areas of redness and/or skin breakdown   Assess vascular access sites hourly     Problem: Pain  Goal: Verbalizes/displays adequate comfort level or baseline comfort level  Outcome: Progressing

## 2025-08-06 NOTE — PROGRESS NOTES
Discharge paperwork reviewed with patient and wife. All questions and concerns answered and addressed.

## 2025-08-06 NOTE — CARE COORDINATION
CM reviewed / screen patient medical chart for discharge.  Patient has discharge order from Dr. Jesica Parada: Patient was admitted on 7/25/25.  Patient has currently used 12 days of estimated length of stay for 13 days.  Patient insurance is Medicare / [facility].  Patient has discharge date / plan at this time scheduled for 8/6/25.  Patient has been accepted and approved by [facility].  Spouse met with [facility] on 8/5/25 and spoke with Christel.  Spouse has declined services at this time.  Spouse would rather wait until HH start services and see how much patient able to do for himself before pursuing further services in the home.  CM will continue to follow patient plan of care and assist with supportive care. CM will continue to follow / monitor for any needs, concerns or questions that may arise.       Name of Ins: MEDICARE  Policy #:  [id number]   Secondary Ins: [facility]   Policy #: [id number]   Auth #  Admission Date: 7/25/25  Approved Dates: 13 days   Discharge Date: 8/6/25  LOS: 12 days   Contact Name:  Contact #  Fax #:  Updated Clinicals: No updates needs to be faxed.         07/26/25 1041   Service Assessment   Patient Orientation Alert and Oriented;Person;Place;Situation;Self   Cognition Alert   History Provided By Patient;Medical Record   Primary Caregiver Self   Support Systems Spouse/Significant Other;Children   Patient's Healthcare Decision Maker is: Legal Next of Kin   PCP Verified by CM Yes   Last Visit to PCP Within last 3 months   Prior Functional Level Independent in ADLs/IADLs   Current Functional Level Assistance with the following:;Bathing;Dressing;Toileting;Mobility   Can patient return to prior living arrangement Yes   Ability to make needs known: Good   Family able to assist with home care needs: Yes   Would you like for me to discuss the discharge plan with any other family members/significant others, and if so, who? Yes   Financial Resources Medicare

## 2025-08-06 NOTE — PROGRESS NOTES
Received report at bedside. Pt is resting comfortably at this time. Pt denies any needs at this time.    Completed hourly rounds during shift. Addressed all pt needs. Pt resting comfortably at this time. Pt denies any needs at this time.

## 2025-08-07 ENCOUNTER — TELEPHONE (OUTPATIENT)
Dept: FAMILY MEDICINE CLINIC | Facility: CLINIC | Age: 75
End: 2025-08-07

## 2025-08-08 ENCOUNTER — TELEMEDICINE (OUTPATIENT)
Dept: FAMILY MEDICINE CLINIC | Facility: CLINIC | Age: 75
End: 2025-08-08

## 2025-08-08 DIAGNOSIS — Z09 HOSPITAL DISCHARGE FOLLOW-UP: Primary | ICD-10-CM

## 2025-08-20 ENCOUNTER — OFFICE VISIT (OUTPATIENT)
Dept: NEUROLOGY | Age: 75
End: 2025-08-20

## 2025-08-20 VITALS
OXYGEN SATURATION: 93 % | BODY MASS INDEX: 28.06 KG/M2 | SYSTOLIC BLOOD PRESSURE: 110 MMHG | RESPIRATION RATE: 18 BRPM | WEIGHT: 196 LBS | DIASTOLIC BLOOD PRESSURE: 69 MMHG | HEART RATE: 66 BPM | HEIGHT: 70 IN

## 2025-08-20 DIAGNOSIS — R26.81 UNSTEADY GAIT WHEN WALKING: ICD-10-CM

## 2025-08-20 DIAGNOSIS — G47.10 HYPERSOMNIA, UNSPECIFIED: ICD-10-CM

## 2025-08-20 DIAGNOSIS — H91.90 HOH (HARD OF HEARING): ICD-10-CM

## 2025-08-20 DIAGNOSIS — Z09 HOSPITAL DISCHARGE FOLLOW-UP: Primary | ICD-10-CM

## 2025-08-20 DIAGNOSIS — Z86.61 H/O BACTERIAL MENINGITIS: ICD-10-CM

## 2025-08-20 DIAGNOSIS — F02.80 ALZHEIMER'S DISEASE (HCC): ICD-10-CM

## 2025-08-20 DIAGNOSIS — R60.0 MILD PERIPHERAL EDEMA: ICD-10-CM

## 2025-08-20 DIAGNOSIS — F05 DELIRIUM SUPERIMPOSED ON DEMENTIA: ICD-10-CM

## 2025-08-20 DIAGNOSIS — G30.9 ALZHEIMER'S DISEASE (HCC): ICD-10-CM

## 2025-08-20 PROBLEM — G91.2 NPH (NORMAL PRESSURE HYDROCEPHALUS) (HCC): Status: RESOLVED | Noted: 2025-07-14 | Resolved: 2025-08-20

## 2025-08-20 ASSESSMENT — PATIENT HEALTH QUESTIONNAIRE - PHQ9
SUM OF ALL RESPONSES TO PHQ QUESTIONS 1-9: 0
SUM OF ALL RESPONSES TO PHQ QUESTIONS 1-9: 0
2. FEELING DOWN, DEPRESSED OR HOPELESS: NOT AT ALL
1. LITTLE INTEREST OR PLEASURE IN DOING THINGS: NOT AT ALL
SUM OF ALL RESPONSES TO PHQ QUESTIONS 1-9: 0
SUM OF ALL RESPONSES TO PHQ QUESTIONS 1-9: 0

## 2025-08-20 ASSESSMENT — ENCOUNTER SYMPTOMS
ALLERGIC/IMMUNOLOGIC NEGATIVE: 1
EYES NEGATIVE: 1
RESPIRATORY NEGATIVE: 1
CONSTIPATION: 1
DIARRHEA: 1

## 2025-08-22 ENCOUNTER — TELEPHONE (OUTPATIENT)
Dept: FAMILY MEDICINE CLINIC | Facility: CLINIC | Age: 75
End: 2025-08-22

## (undated) DEVICE — 40585 XL ADVANCED TRENDELENBURG POSITIONING KIT: Brand: 40585 XL ADVANCED TRENDELENBURG POSITIONING KIT

## (undated) DEVICE — PROGRASP FORCEPS: Brand: ENDOWRIST

## (undated) DEVICE — DRAPE,UNDERBUTTOCKS,PCH,STERILE: Brand: MEDLINE

## (undated) DEVICE — [HIGH FLOW INSUFFLATOR,  DO NOT USE IF PACKAGE IS DAMAGED,  KEEP DRY,  KEEP AWAY FROM SUNLIGHT,  PROTECT FROM HEAT AND RADIOACTIVE SOURCES.]: Brand: PNEUMOSURE

## (undated) DEVICE — SYRINGE MED 3ML CLR PLAS STD N CTRL LUERLOCK TIP DISP

## (undated) DEVICE — JELLY LUBRICATING 10GM PREFIL SYR LUBE

## (undated) DEVICE — Device

## (undated) DEVICE — ARM DRAPE

## (undated) DEVICE — BLADELESS OBTURATOR: Brand: WECK VISTA

## (undated) DEVICE — LUBE JELLY FOIL PACK 1.4 OZ: Brand: MEDLINE INDUSTRIES, INC.

## (undated) DEVICE — ELECTRODE PT RET AD L9FT HI MOIST COND ADH HYDRGEL CORDED

## (undated) DEVICE — YANKAUER,BULB TIP,W/O VENT,RIGID,STERILE: Brand: MEDLINE

## (undated) DEVICE — COVER MPLR TIP CRV SCIS ACC DA VINCI

## (undated) DEVICE — CONTAINER SPEC FRMLN 120ML --

## (undated) DEVICE — CONTAINER FORMALIN PREFILLED 10% NBF 60ML

## (undated) DEVICE — SPONGE LAP 18X18IN STRL -- 5/PK

## (undated) DEVICE — CATH URETH FOL 2W SH 20FRX5 --

## (undated) DEVICE — MONOPOLAR CAUTERY CORD

## (undated) DEVICE — ROBOTIC PROSTATE: Brand: MEDLINE INDUSTRIES, INC.

## (undated) DEVICE — DERMABOND SKIN ADH 0.7ML -- DERMABOND ADVANCED 12/BX

## (undated) DEVICE — 2000CC GUARDIAN II: Brand: GUARDIAN

## (undated) DEVICE — SUTURE ABSORBABLE MONOFILAMENT 0 CT-1 36 MM DYED SPRL PDS + SXPP1B450

## (undated) DEVICE — GENERAL LAPAROSCOPY: Brand: MEDLINE INDUSTRIES, INC.

## (undated) DEVICE — NEEDLE HYPO 21GA L1.5IN INTRAMUSCULAR S STL LATCH BVL UP

## (undated) DEVICE — FORCEPS BX L L240CM DIA2.4MM RAD JAW 4 HOT FOR POLYP DISP

## (undated) DEVICE — REM POLYHESIVE ADULT PATIENT RETURN ELECTRODE: Brand: VALLEYLAB

## (undated) DEVICE — DRAPE,TOP,102X53,STERILE: Brand: MEDLINE

## (undated) DEVICE — BAG,DRAINAGE,4L,A/R TOWER,LL,SLIDE TAP: Brand: MEDLINE

## (undated) DEVICE — GOWN SURG 2XL 49 IN AAMI LEVEL 3 ORBIS

## (undated) DEVICE — GARMENT,MEDLINE,DVT,INT,CALF,MED, GEN2: Brand: MEDLINE

## (undated) DEVICE — SUTURE MCRYL SZ 4-0 L27IN ABSRB UD L19MM PS-2 1/2 CIR PRIM Y426H

## (undated) DEVICE — NEEDLE SYR 18GA L1.5IN RED PLAS HUB S STL BLNT FILL W/O

## (undated) DEVICE — COLUMN DRAPE

## (undated) DEVICE — GOWN,ECLIPSE,NONRNF,XL,ST,30/CS: Brand: MEDLINE

## (undated) DEVICE — STERILE LATEX POWDER FREE SURGICAL GLOVES WITH HYDROGEL COATING: Brand: PROTEXIS

## (undated) DEVICE — SUTURE ETHLN SZ 2-0 L18IN NONABSORBABLE BLK L26MM PS 3/8 585H

## (undated) DEVICE — INTENDED FOR TISSUE SEPARATION, AND OTHER PROCEDURES THAT REQUIRE A SHARP SURGICAL BLADE TO PUNCTURE OR CUT.: Brand: BARD-PARKER ® STAINLESS STEEL BLADES

## (undated) DEVICE — BAG SPEC REM 224ML W4XL6IN DIA10MM 1 HND GYN DISP ENDOPCH

## (undated) DEVICE — CLIP INT 12MM BLK SGL ABSRB LIG LAPRO

## (undated) DEVICE — SOLUTION ANTIFOG VIS SYS CLEARIFY LAPSCP

## (undated) DEVICE — VISUALIZATION SYSTEM: Brand: CLEARIFY

## (undated) DEVICE — BUTTON SWITCH PENCIL BLADE ELECTRODE, HOLSTER: Brand: EDGE

## (undated) DEVICE — LEGGINGS, PAIR, 31X48, STERILE: Brand: MEDLINE

## (undated) DEVICE — HOLDER PRB URONAV

## (undated) DEVICE — TIP COVER ACCESSORY

## (undated) DEVICE — CANNULA NSL ORAL AD FOR CAPNOFLEX CO2 O2 AIRLFE

## (undated) DEVICE — SYSTEM POS SZ 36 X 20 X 1 IN INTEGR ADJ ARM PROTECTOR LIFT

## (undated) DEVICE — TROCAR: Brand: KII® OPTICAL ACCESS SYSTEM

## (undated) DEVICE — SOLUTION IRRIG 1000ML 09% SOD CHL USP PIC PLAS CONTAINER

## (undated) DEVICE — TROCAR LAP L100MM DIA5MM BLDELSS W/ STBL SL ENDOPATH XCEL

## (undated) DEVICE — 2, DISPOSABLE SUCTION/IRRIGATOR WITHOUT DISPOSABLE TIP: Brand: STRYKEFLOW

## (undated) DEVICE — SYRINGE CATH TIP 50ML

## (undated) DEVICE — AIRLIFE™ OXYGEN TUBING 7 FEET (2.1 M) CRUSH RESISTANT OXYGEN TUBING, VINYL TIPPED: Brand: AIRLIFE™

## (undated) DEVICE — MAX-CORE® DISPOSABLE CORE BIOPSY INSTRUMENT, 18G X 25CM: Brand: MAX-CORE

## (undated) DEVICE — AMD ANTIMICROBIAL NON-ADHERENT ISLAND DRESSING,0.2% POLYHEXAMETHYLENE BIGUANIDE HCI (PHMB): Brand: TELFA

## (undated) DEVICE — SYRINGE MED 10ML LUERLOCK TIP W/O SFTY DISP

## (undated) DEVICE — SUTURE VCRL SZ 3-0 L27IN ABSRB UD L17MM RB-1 1/2 CIR J215H

## (undated) DEVICE — 1071 S-DRP URO STLE-GAMA 10/BX,4X/C: Brand: STERI-DRAPE™

## (undated) DEVICE — BASIC SINGLE BASIN-LF: Brand: MEDLINE INDUSTRIES, INC.

## (undated) DEVICE — SCISSORS SURG DIA8MM MPLR CRV ENDOWRIST

## (undated) DEVICE — BIPOLAR CAUTERY CORD

## (undated) DEVICE — GLOVE SURG SZ 8 CRM LTX FREE POLYISOPRENE POLYMER BEAD ANTI

## (undated) DEVICE — APPLICATOR COT TIP CTR 6IN

## (undated) DEVICE — ELECTRO LUBE IS A SINGLE PATIENT USE DEVICE THAT IS INTENDED TO BE USED ON ELECTROSURGICAL ELECTRODES TO REDUCE STICKING.: Brand: KEY SURGICAL ELECTRO LUBE

## (undated) DEVICE — SYRINGE, LUER SLIP, STERILE, 60ML: Brand: MEDLINE

## (undated) DEVICE — COVER,TABLE,44X76,STERILE: Brand: MEDLINE

## (undated) DEVICE — NEEDLE INSUF L120MM ULT VERES ENDOPATH

## (undated) DEVICE — 3M™ IOBAN™ 2 ANTIMICROBIAL INCISE DRAPE 6650EZ: Brand: IOBAN™ 2

## (undated) DEVICE — GAUZE,SPONGE,2"X2",8PLY,STERILE,LF,2'S: Brand: MEDLINE

## (undated) DEVICE — SUTURE ABSORBABLE BRAIDED 2-0 CT-1 27 IN UD VICRYL J259H

## (undated) DEVICE — DRAPE, FILM SHEET, 44X65 STERILE: Brand: MEDLINE

## (undated) DEVICE — SUTURE MONOCRYL SZ 4-0 L27IN ABSRB UD L19MM PS-2 1/2 CIR PRIM Y426H

## (undated) DEVICE — DRAPE TWL SURG 16X26IN BLU ORB04] ALLCARE INC]

## (undated) DEVICE — SYR 10ML LUER LOK 1/5ML GRAD --

## (undated) DEVICE — LOGICUT SCISSOR LENGTH 450MM: Brand: LOGI - LAPAROSCOPIC INSTRUMENT SYSTEM

## (undated) DEVICE — SUTURE STRATAFIX SYMMETRIC SZ 1 L18IN ABSRB VLT CT1 L36CM SXPP1A404

## (undated) DEVICE — CONNECTOR TBNG OD5-7MM O2 END DISP

## (undated) DEVICE — SOLUTION IRRIG 1000ML H2O STRL BLT

## (undated) DEVICE — KIT BX PROST 20ML VI PREFIL W 10ML 10% NEUT BUFF FRMLN LEAK

## (undated) DEVICE — TRAY PREP DRY W/ PREM GLV 2 APPL 6 SPNG 2 UNDPD 1 OVERWRAP

## (undated) DEVICE — SEAL UNIV 5-8MM DISP BX/10 -- DA VINCI XI - SNGL USE

## (undated) DEVICE — TROCAR: Brand: KII FIOS FIRST ENTRY

## (undated) DEVICE — SUTURE MCRYL SZ 3-0 L27IN ABSRB UD L17MM RB-1 1/2 CIR Y215H

## (undated) DEVICE — MASTISOL ADHESIVE LIQ 2/3ML

## (undated) DEVICE — CARDINAL HEALTH FLEXIBLE LIGHT HANDLE COVER: Brand: CARDINAL HEALTH

## (undated) DEVICE — DRAIN WND RND SILICONE 15FR --

## (undated) DEVICE — SEAL

## (undated) DEVICE — 3M™ TEGADERM™ TRANSPARENT FILM DRESSING FRAME STYLE, 1624W, 2-3/8 IN X 2-3/4 IN (6 CM X 7 CM), 100/CT 4CT/CASE: Brand: 3M™ TEGADERM™

## (undated) DEVICE — KENDALL RADIOLUCENT FOAM MONITORING ELECTRODE RECTANGULAR SHAPE: Brand: KENDALL

## (undated) DEVICE — SUTURE MCRYL SZ 3-0 L27IN ABSRB VLT L17MM RB-1 1/2 CIR Y305H

## (undated) DEVICE — STERILE PACKED. BORE DIAMETER 1.6 MM; ANGLE OF INSERTION 19° TO THE LONG AXIS OF THE TRANSDUCER: Brand: SINGLE-USE BIPLANE BIOPSY GUIDE

## (undated) DEVICE — MARYLAND BIPOLAR FORCEPS: Brand: ENDOWRIST

## (undated) DEVICE — GAUZE,SPONGE,4"X4",12PLY,WOVEN,NS,LF: Brand: MEDLINE

## (undated) DEVICE — SUTURE DEV SZ 2-0 WND CLSR ABSRB GS-22 VLOC COVIDIEN VLOCM2145

## (undated) DEVICE — SUTURE SZ 0 27IN 5/8 CIR UR-6  TAPER PT VIOLET ABSRB VICRYL J603H

## (undated) DEVICE — LARGE NEEDLE DRIVER: Brand: ENDOWRIST

## (undated) DEVICE — (D)PREP SKN CHLRAPRP APPL 26ML -- CONVERT TO ITEM 371833

## (undated) DEVICE — CATHETERIZATION KIT PEDIATRIC 18 FR 5 CC FOL TEMP SENS

## (undated) DEVICE — AIRSEAL BIFURCATED FILTERED TUBESET WITH ACTIVATED CHARCOAL FILTER: Brand: AIRSEAL

## (undated) DEVICE — TRI-LUMEN FILTERED TUBE SET WITH ACTIVATED CHARCOAL FILTER: Brand: AIRSEAL

## (undated) DEVICE — DEVICE SECUREMENT 1/32IN POLYETH FOAM F ANCHR URIN CATH

## (undated) DEVICE — STRIP,CLOSURE,WOUND,MEDI-STRIP,1/2X4: Brand: MEDLINE

## (undated) DEVICE — SINGLE PORT MANIFOLD: Brand: NEPTUNE 2

## (undated) DEVICE — GOWN,REINF,POLY,ECL,PP SLV,XL: Brand: MEDLINE